# Patient Record
Sex: MALE | Race: WHITE | Employment: FULL TIME | URBAN - METROPOLITAN AREA
[De-identification: names, ages, dates, MRNs, and addresses within clinical notes are randomized per-mention and may not be internally consistent; named-entity substitution may affect disease eponyms.]

---

## 2017-03-10 ENCOUNTER — TELEPHONE (OUTPATIENT)
Dept: INTERNAL MEDICINE CLINIC | Facility: CLINIC | Age: 62
End: 2017-03-10

## 2017-03-10 NOTE — TELEPHONE ENCOUNTER
Pt is aware and has received his letter in the mail   Pt would like to know if you would be making a special recommendation to see another MD due to his back issue  Please advise

## 2017-07-11 ENCOUNTER — OFFICE VISIT (OUTPATIENT)
Dept: PODIATRY CLINIC | Facility: CLINIC | Age: 62
End: 2017-07-11

## 2017-07-11 ENCOUNTER — TELEPHONE (OUTPATIENT)
Dept: INTERNAL MEDICINE CLINIC | Facility: CLINIC | Age: 62
End: 2017-07-11

## 2017-07-11 DIAGNOSIS — M79.671 FOOT PAIN, BILATERAL: Primary | ICD-10-CM

## 2017-07-11 DIAGNOSIS — M79.672 FOOT PAIN, BILATERAL: Primary | ICD-10-CM

## 2017-07-11 DIAGNOSIS — M72.2 PLANTAR FASCIITIS, BILATERAL: ICD-10-CM

## 2017-07-11 PROCEDURE — 99203 OFFICE O/P NEW LOW 30 MIN: CPT | Performed by: PODIATRIST

## 2017-07-11 PROCEDURE — 99212 OFFICE O/P EST SF 10 MIN: CPT | Performed by: PODIATRIST

## 2017-07-11 NOTE — PROGRESS NOTES
HPI:    Patient ID: Raeann Pickard is a 59year old male. HPI  This 58-year-old male presents to the office having not been seen in many many years.   He has been wearing the same orthotic device and feels as though it is not effectively controlling po encounter    Imaging & Referrals:  None       #[phone]

## 2017-07-11 NOTE — TELEPHONE ENCOUNTER
Per pt, he saw Dr Carly Mendoza today for his foot pain and has another appt on 08/02/2017 for f/u and would like to have a referral.  Pt has an appt with Dr Christiano Lobato on 08/10/2017. Pls advise.

## 2017-07-19 ENCOUNTER — HOSPITAL ENCOUNTER (OUTPATIENT)
Dept: GENERAL RADIOLOGY | Facility: HOSPITAL | Age: 62
Discharge: HOME | End: 2017-07-19
Attending: ORTHOPAEDIC SURGERY
Payer: COMMERCIAL

## 2017-07-19 ENCOUNTER — OFFICE VISIT (OUTPATIENT)
Dept: ORTHOPEDICS CLINIC | Facility: CLINIC | Age: 62
End: 2017-07-19

## 2017-07-19 DIAGNOSIS — M25.561 RIGHT KNEE PAIN, UNSPECIFIED CHRONICITY: ICD-10-CM

## 2017-07-19 DIAGNOSIS — M22.41 CHONDROMALACIA, PATELLA, RIGHT: ICD-10-CM

## 2017-07-19 DIAGNOSIS — M25.561 RIGHT KNEE PAIN, UNSPECIFIED CHRONICITY: Primary | ICD-10-CM

## 2017-07-19 PROCEDURE — 73560 X-RAY EXAM OF KNEE 1 OR 2: CPT | Performed by: ORTHOPAEDIC SURGERY

## 2017-07-19 PROCEDURE — 99243 OFF/OP CNSLTJ NEW/EST LOW 30: CPT | Performed by: ORTHOPAEDIC SURGERY

## 2017-07-19 PROCEDURE — 73565 X-RAY EXAM OF KNEES: CPT | Performed by: ORTHOPAEDIC SURGERY

## 2017-07-19 PROCEDURE — 99212 OFFICE O/P EST SF 10 MIN: CPT | Performed by: ORTHOPAEDIC SURGERY

## 2017-07-19 NOTE — H&P
Chief Complaint: Right knee pain    NURSING INTAKE COMMENTS: Patient presents with:  Knee Pain: Right - onset years ago - no injury - has pain acros the anterioir aspect of the knee rated as 4-6/10 on and off, no numbness or tingling, no swelling .       Hi Stability Testing        Anterior Drawer Negative Negative      Posterior Drawer Negative Negative      Lachman Negative Negative      Pivot Shift Negative Negative      Posterolateral corner Negative Negative        Varus Stress        0 Degrees Negat

## 2017-08-02 ENCOUNTER — OFFICE VISIT (OUTPATIENT)
Dept: PODIATRY CLINIC | Facility: CLINIC | Age: 62
End: 2017-08-02

## 2017-08-02 DIAGNOSIS — M72.2 PLANTAR FASCIITIS, BILATERAL: Primary | ICD-10-CM

## 2017-08-02 PROCEDURE — 99212 OFFICE O/P EST SF 10 MIN: CPT | Performed by: PODIATRIST

## 2017-08-02 NOTE — PROGRESS NOTES
HPI:    Patient ID: Eden Wilcox is a 59year old male. HPI  25-year-old male presents for follow-up and finds that he is more comfortable with added support but it is not sufficient.   Review of Systems         Current Outpatient Prescriptions:  asp

## 2017-08-10 ENCOUNTER — OFFICE VISIT (OUTPATIENT)
Dept: INTERNAL MEDICINE CLINIC | Facility: CLINIC | Age: 62
End: 2017-08-10

## 2017-08-10 VITALS
WEIGHT: 232.69 LBS | BODY MASS INDEX: 31.52 KG/M2 | SYSTOLIC BLOOD PRESSURE: 126 MMHG | HEIGHT: 72 IN | RESPIRATION RATE: 18 BRPM | HEART RATE: 84 BPM | DIASTOLIC BLOOD PRESSURE: 83 MMHG

## 2017-08-10 DIAGNOSIS — Z00.00 ROUTINE HEALTH MAINTENANCE: ICD-10-CM

## 2017-08-10 DIAGNOSIS — M79.672 FOOT PAIN, BILATERAL: ICD-10-CM

## 2017-08-10 DIAGNOSIS — M79.671 FOOT PAIN, BILATERAL: ICD-10-CM

## 2017-08-10 DIAGNOSIS — Z23 NEED FOR VACCINATION: ICD-10-CM

## 2017-08-10 DIAGNOSIS — M17.31 POST-TRAUMATIC OSTEOARTHRITIS OF RIGHT KNEE: Primary | ICD-10-CM

## 2017-08-10 PROCEDURE — 99214 OFFICE O/P EST MOD 30 MIN: CPT | Performed by: INTERNAL MEDICINE

## 2017-08-10 PROCEDURE — 90715 TDAP VACCINE 7 YRS/> IM: CPT | Performed by: INTERNAL MEDICINE

## 2017-08-10 PROCEDURE — 90471 IMMUNIZATION ADMIN: CPT | Performed by: INTERNAL MEDICINE

## 2017-08-10 PROCEDURE — 99212 OFFICE O/P EST SF 10 MIN: CPT | Performed by: INTERNAL MEDICINE

## 2017-08-10 NOTE — PROGRESS NOTES
HPI:    Patient ID: Jimenez Bettencourt is a 59year old male. Former pt of Dr. Severo Mask. He is a  at [employer]. He has knee pain and will start PT. He also has LBP and foot pain. He has a growth under his arm.   He has a history of ulcer maintenance  - COMP METABOLIC PANEL (14); Future  - TSH W REFLEX TO FREE T4; Future  - CBC WITH DIFFERENTIAL WITH PLATELET; Future  - PSA SCREEN; Future  - LIPID PANEL; Future  He will see the optometrist using his vision benefits    4.  Need for vaccinatio

## 2017-08-28 ENCOUNTER — OFFICE VISIT (OUTPATIENT)
Dept: PODIATRY CLINIC | Facility: CLINIC | Age: 62
End: 2017-08-28

## 2017-08-28 DIAGNOSIS — M72.2 PLANTAR FASCIITIS, BILATERAL: Primary | ICD-10-CM

## 2017-08-28 PROCEDURE — 99212 OFFICE O/P EST SF 10 MIN: CPT | Performed by: PODIATRIST

## 2017-08-28 PROCEDURE — 99213 OFFICE O/P EST LOW 20 MIN: CPT | Performed by: PODIATRIST

## 2017-08-28 PROCEDURE — 29799 UNLISTED PX CASTING/STRPG: CPT | Performed by: PODIATRIST

## 2017-08-28 PROCEDURE — L3000 FT INSERT UCB BERKELEY SHELL: HCPCS | Performed by: PODIATRIST

## 2017-08-28 NOTE — PROGRESS NOTES
HPI:    Patient ID: Morris Orantes is a 59year old male. HPI  This 57-year-old male presents for long-term orthotic control. Approval has been received for this treatment.   Review of Systems         Current Outpatient Prescriptions:  aspirin (BABY A

## 2017-08-29 ENCOUNTER — TELEPHONE (OUTPATIENT)
Dept: PODIATRY CLINIC | Facility: CLINIC | Age: 62
End: 2017-08-29

## 2017-08-29 DIAGNOSIS — M79.9 LESION OF SOFT TISSUE OF LOWER LEG AND ANKLE: Primary | ICD-10-CM

## 2017-08-29 NOTE — TELEPHONE ENCOUNTER
LM on pt's preferred # informing that dermatology referral has been placed. Gave pt phone # to schedule with dermatology.  Gave phone # for [facility] to check status of auth for derm referral.

## 2017-08-29 NOTE — TELEPHONE ENCOUNTER
Spoke to pt and he states that [facility] saw him in the office yesterday. States he mentioned to [facility] about some scars on his lower extremities and claims SCR states he should see a dermatologist regarding this. Pt asking for referral from [facility] for dermatology.  In

## 2017-09-21 ENCOUNTER — TELEPHONE (OUTPATIENT)
Dept: ORTHOPEDICS CLINIC | Facility: CLINIC | Age: 62
End: 2017-09-21

## 2017-09-21 NOTE — TELEPHONE ENCOUNTER
Orthotics are in . Call to loly on two phone numbers home and cell. No answerr. Left voice message on both lines. Needs an appointment at [facility].  Requested call back

## 2017-10-25 ENCOUNTER — OFFICE VISIT (OUTPATIENT)
Dept: PODIATRY CLINIC | Facility: CLINIC | Age: 62
End: 2017-10-25

## 2017-10-25 DIAGNOSIS — M72.2 PLANTAR FASCIITIS, BILATERAL: Primary | ICD-10-CM

## 2017-10-25 NOTE — PROGRESS NOTES
HPI:    Patient ID: Izabel Danielle is a 57year old male. HPI  24-year-old male presents for dispensing of orthoses.   They appear to be of proper fit I do not anticipate areas of irritation based on the fact that he is presently wearing a device I do

## 2017-12-04 ENCOUNTER — OFFICE VISIT (OUTPATIENT)
Dept: DERMATOLOGY CLINIC | Facility: CLINIC | Age: 62
End: 2017-12-04

## 2017-12-04 VITALS — HEIGHT: 72 IN | BODY MASS INDEX: 30.48 KG/M2 | WEIGHT: 225 LBS

## 2017-12-04 DIAGNOSIS — D23.70 BENIGN NEOPLASM OF SKIN OF LOWER LIMB, INCLUDING HIP, UNSPECIFIED LATERALITY: ICD-10-CM

## 2017-12-04 DIAGNOSIS — L71.9 ROSACEA: ICD-10-CM

## 2017-12-04 DIAGNOSIS — D23.60 BENIGN NEOPLASM OF SKIN OF UPPER LIMB, INCLUDING SHOULDER, UNSPECIFIED LATERALITY: ICD-10-CM

## 2017-12-04 DIAGNOSIS — D23.4 BENIGN NEOPLASM OF SCALP AND SKIN OF NECK: ICD-10-CM

## 2017-12-04 DIAGNOSIS — D23.30 BENIGN NEOPLASM OF SKIN OF FACE: ICD-10-CM

## 2017-12-04 DIAGNOSIS — D22.9 MULTIPLE NEVI: Primary | ICD-10-CM

## 2017-12-04 DIAGNOSIS — D23.5 BENIGN NEOPLASM OF SKIN OF TRUNK, EXCEPT SCROTUM: ICD-10-CM

## 2017-12-04 PROCEDURE — 99203 OFFICE O/P NEW LOW 30 MIN: CPT | Performed by: DERMATOLOGY

## 2017-12-04 PROCEDURE — 99212 OFFICE O/P EST SF 10 MIN: CPT | Performed by: DERMATOLOGY

## 2018-04-09 ENCOUNTER — MED REC SCAN ONLY (OUTPATIENT)
Dept: INTERNAL MEDICINE CLINIC | Facility: CLINIC | Age: 63
End: 2018-04-09

## 2018-06-14 ENCOUNTER — TELEPHONE (OUTPATIENT)
Dept: INTERNAL MEDICINE CLINIC | Facility: CLINIC | Age: 63
End: 2018-06-14

## 2018-06-14 DIAGNOSIS — Z00.00 ROUTINE HEALTH MAINTENANCE: Primary | ICD-10-CM

## 2018-06-14 NOTE — TELEPHONE ENCOUNTER
Pt req an order for needed labs before his OV on Tuesday, 7/3/2018, please call when order has been submitted.

## 2018-06-15 NOTE — TELEPHONE ENCOUNTER
Left patient a message informing him that MD had approved his lab orders and that he needed to fast for 12 hours

## 2018-07-03 ENCOUNTER — OFFICE VISIT (OUTPATIENT)
Dept: INTERNAL MEDICINE CLINIC | Facility: CLINIC | Age: 63
End: 2018-07-03

## 2018-07-03 VITALS
WEIGHT: 230.19 LBS | DIASTOLIC BLOOD PRESSURE: 84 MMHG | SYSTOLIC BLOOD PRESSURE: 132 MMHG | HEART RATE: 68 BPM | HEIGHT: 71 IN | BODY MASS INDEX: 32.23 KG/M2

## 2018-07-03 DIAGNOSIS — M17.31 POST-TRAUMATIC OSTEOARTHRITIS OF RIGHT KNEE: ICD-10-CM

## 2018-07-03 DIAGNOSIS — E66.9 OBESITY (BMI 30-39.9): Primary | ICD-10-CM

## 2018-07-03 DIAGNOSIS — E78.2 MIXED HYPERLIPIDEMIA: ICD-10-CM

## 2018-07-03 PROCEDURE — 99212 OFFICE O/P EST SF 10 MIN: CPT | Performed by: INTERNAL MEDICINE

## 2018-07-03 PROCEDURE — 99213 OFFICE O/P EST LOW 20 MIN: CPT | Performed by: INTERNAL MEDICINE

## 2018-07-03 NOTE — PATIENT INSTRUCTIONS
Follow a low carbohydrate diet. Examples of carbohydrates are sweets, bread, rice, potatoes, and pasta. Eat no more than 100 gm of carbs daily.   You should try to eat more salads with lowfat dressing, steamed vegetables, and broiled or grilled chicken and

## 2018-07-03 NOTE — PROGRESS NOTES
HPI:    Patient ID: Wilmer Ann is a 57year old male. Pt did not have time to get his blood tests. He needs to lose weight. He has been at [facility] for 21 years and there is some kind of change coming.   His low back pain is still there, History   Problem Relation Age of Onset   • Stroke Mother      CVA; Cause of death   • Macular degeneration Mother    • Diabetes Father        . /84 (BP Location: Right arm, Patient Position: Sitting, Cuff Size: large)   Pulse 68   Ht 5' 11\" (1.803 m

## 2018-07-03 NOTE — ASSESSMENT & PLAN NOTE
The patient has been doing a stair machine for exercise. I advised against this and encouraged him to use instead the recumbent bike or perhaps a treadmill for walking. Most importantly he needs to lose weight and he wants to do this.

## 2018-10-02 ENCOUNTER — MED REC SCAN ONLY (OUTPATIENT)
Dept: INTERNAL MEDICINE CLINIC | Facility: CLINIC | Age: 63
End: 2018-10-02

## 2018-10-02 ENCOUNTER — LAB ENCOUNTER (OUTPATIENT)
Dept: LAB | Facility: HOSPITAL | Age: 63
End: 2018-10-02
Attending: INTERNAL MEDICINE
Payer: COMMERCIAL

## 2018-10-02 ENCOUNTER — OFFICE VISIT (OUTPATIENT)
Dept: INTERNAL MEDICINE CLINIC | Facility: CLINIC | Age: 63
End: 2018-10-02

## 2018-10-02 VITALS
WEIGHT: 231.13 LBS | SYSTOLIC BLOOD PRESSURE: 123 MMHG | BODY MASS INDEX: 32.36 KG/M2 | HEIGHT: 71 IN | HEART RATE: 88 BPM | DIASTOLIC BLOOD PRESSURE: 80 MMHG

## 2018-10-02 DIAGNOSIS — E78.2 MIXED HYPERLIPIDEMIA: Primary | ICD-10-CM

## 2018-10-02 DIAGNOSIS — R73.09 ELEVATED GLUCOSE: ICD-10-CM

## 2018-10-02 DIAGNOSIS — Z00.00 ROUTINE HEALTH MAINTENANCE: ICD-10-CM

## 2018-10-02 DIAGNOSIS — E66.9 OBESITY (BMI 30-39.9): ICD-10-CM

## 2018-10-02 PROCEDURE — 84443 ASSAY THYROID STIM HORMONE: CPT

## 2018-10-02 PROCEDURE — 99212 OFFICE O/P EST SF 10 MIN: CPT | Performed by: INTERNAL MEDICINE

## 2018-10-02 PROCEDURE — 83036 HEMOGLOBIN GLYCOSYLATED A1C: CPT

## 2018-10-02 PROCEDURE — 36415 COLL VENOUS BLD VENIPUNCTURE: CPT

## 2018-10-02 PROCEDURE — 83721 ASSAY OF BLOOD LIPOPROTEIN: CPT

## 2018-10-02 PROCEDURE — 99214 OFFICE O/P EST MOD 30 MIN: CPT | Performed by: INTERNAL MEDICINE

## 2018-10-02 PROCEDURE — 90471 IMMUNIZATION ADMIN: CPT | Performed by: INTERNAL MEDICINE

## 2018-10-02 PROCEDURE — 80061 LIPID PANEL: CPT

## 2018-10-02 PROCEDURE — 90653 IIV ADJUVANT VACCINE IM: CPT | Performed by: INTERNAL MEDICINE

## 2018-10-02 PROCEDURE — 85025 COMPLETE CBC W/AUTO DIFF WBC: CPT

## 2018-10-02 PROCEDURE — 80053 COMPREHEN METABOLIC PANEL: CPT

## 2018-10-02 RX ORDER — ATORVASTATIN CALCIUM 10 MG/1
10 TABLET, FILM COATED ORAL NIGHTLY
Qty: 30 TABLET | Refills: 3 | Status: SHIPPED | OUTPATIENT
Start: 2018-10-02 | End: 2019-06-06

## 2018-10-02 NOTE — ASSESSMENT & PLAN NOTE
His TG and total cholesterol are very high. Discussed risks and benefits of statins. Pt will try low dose statin in addition to diet and exercise. Recheck lipids and liver tests in 3 months.

## 2018-10-02 NOTE — PATIENT INSTRUCTIONS
I recommend that you decrease consumption of sweets, sweet drinks, bread, rice, pasta, and potatoes. Try to limit your carbohydrates to 100 gm daily. Understanding Carbohydrates    A car needs the right type of fuel to run.  And you need the right figure out the right amount of carbohydrates for you. You may start with around 45 to 60 grams of carbohydrate per meal, depending on your situation.  Carb counting is a system that helps you keep track of the carbohydrates you eat at each meal.  [employer] labels. Compare the labels of different products, looking at serving sizes and total carbohydrates to find the products that work best for you.   · Don't forget protein and fat.  With all the focus on carb counting, it might be easy to forget protein and fa

## 2018-10-02 NOTE — ASSESSMENT & PLAN NOTE
His FBS was 120. Discussed prediabetes with pt. Will check A1C. Advised low carb diet.     Devika written information given to pt

## 2018-10-02 NOTE — PROGRESS NOTES
HPI:    Patient ID: Morris Orantes is a 70year old male. Pt is doing well. He did the blood test today. His TG and total cholesterol are very high. His low back hurts when he gets out of bed. His sugar was high.   He eats a lot of bread, but doesn' kg)   BMI 32.23 kg/m²   PHYSICAL EXAM:   Physical Exam   Nursing note and vitals reviewed. Constitutional: He is oriented to person, place, and time. He appears well-developed and well-nourished. HENT:   Head: Normocephalic and atraumatic.    Eyes: EOM

## 2019-06-06 ENCOUNTER — OFFICE VISIT (OUTPATIENT)
Dept: INTERNAL MEDICINE CLINIC | Facility: CLINIC | Age: 64
End: 2019-06-06

## 2019-06-06 ENCOUNTER — HOSPITAL ENCOUNTER (OUTPATIENT)
Dept: GENERAL RADIOLOGY | Facility: HOSPITAL | Age: 64
Discharge: HOME | End: 2019-06-06
Attending: INTERNAL MEDICINE
Payer: COMMERCIAL

## 2019-06-06 VITALS
BODY MASS INDEX: 32.06 KG/M2 | DIASTOLIC BLOOD PRESSURE: 80 MMHG | SYSTOLIC BLOOD PRESSURE: 122 MMHG | WEIGHT: 229 LBS | HEART RATE: 69 BPM | HEIGHT: 71 IN

## 2019-06-06 DIAGNOSIS — E78.00 HYPERCHOLESTEROLEMIA: Primary | ICD-10-CM

## 2019-06-06 DIAGNOSIS — D22.9 ATYPICAL MOLE: ICD-10-CM

## 2019-06-06 DIAGNOSIS — B35.4 TINEA CORPORIS: ICD-10-CM

## 2019-06-06 DIAGNOSIS — M25.552 PAIN OF LEFT HIP JOINT: ICD-10-CM

## 2019-06-06 PROCEDURE — 99214 OFFICE O/P EST MOD 30 MIN: CPT | Performed by: INTERNAL MEDICINE

## 2019-06-06 PROCEDURE — 99212 OFFICE O/P EST SF 10 MIN: CPT | Performed by: INTERNAL MEDICINE

## 2019-06-06 PROCEDURE — 73502 X-RAY EXAM HIP UNI 2-3 VIEWS: CPT | Performed by: INTERNAL MEDICINE

## 2019-06-06 RX ORDER — KETOCONAZOLE 20 MG/G
CREAM TOPICAL
Qty: 30 G | Refills: 0 | Status: SHIPPED | OUTPATIENT
Start: 2019-06-06 | End: 2019-07-30

## 2019-06-06 RX ORDER — ATORVASTATIN CALCIUM 10 MG/1
10 TABLET, FILM COATED ORAL NIGHTLY
Qty: 30 TABLET | Refills: 3 | Status: SHIPPED | OUTPATIENT
Start: 2019-06-06 | End: 2019-08-22

## 2019-06-06 NOTE — ASSESSMENT & PLAN NOTE
Will use antifungal cream.  Advised pt that I must see this in 3 weeks because if it does not resolve, he will need further w/u

## 2019-06-06 NOTE — ASSESSMENT & PLAN NOTE
Pt never took the Lipitor. Urged him to take it. Discussed that he can have 1 serving of grapefruit daily. Recheck lipids in 2 months.

## 2019-06-06 NOTE — PROGRESS NOTES
HPI:    Patient ID: Ladan Allen is a 70year old male. He started taking the Lipitor, but he only took it for 2 weeks because he drinks grapefruit juice. He has a mole on his left upper arm for several years.   He has an itchy rash on his right nip ketoconazole 2 % External Cream Apply to affected area twice a day. Disp: 30 g Rfl: 0   hydrocortisone 2.5 % External Cream Apply 1 Application topically daily. Disp: 28 g Rfl: 0   atorvastatin 10 MG Oral Tab Take 1 tablet (10 mg total) by mouth nightly.  D Unprioritized    Hypercholesterolemia - Primary     Pt never took the Lipitor. Urged him to take it. Discussed that he can have 1 serving of grapefruit daily. Recheck lipids in 2 months.          Relevant Medications    atorvastatin 10 MG Oral Tab    Ot

## 2019-07-01 ENCOUNTER — TELEPHONE (OUTPATIENT)
Dept: INTERNAL MEDICINE CLINIC | Facility: CLINIC | Age: 64
End: 2019-07-01

## 2019-07-08 ENCOUNTER — TELEPHONE (OUTPATIENT)
Dept: PHYSICAL THERAPY | Facility: HOSPITAL | Age: 64
End: 2019-07-08

## 2019-07-12 ENCOUNTER — APPOINTMENT (OUTPATIENT)
Dept: PHYSICAL THERAPY | Facility: HOSPITAL | Age: 64
End: 2019-07-12
Attending: INTERNAL MEDICINE
Payer: COMMERCIAL

## 2019-07-16 ENCOUNTER — TELEPHONE (OUTPATIENT)
Dept: INTERNAL MEDICINE CLINIC | Facility: CLINIC | Age: 64
End: 2019-07-16

## 2019-07-16 ENCOUNTER — OFFICE VISIT (OUTPATIENT)
Dept: PHYSICAL THERAPY | Facility: HOSPITAL | Age: 64
End: 2019-07-16
Attending: INTERNAL MEDICINE
Payer: COMMERCIAL

## 2019-07-16 DIAGNOSIS — M25.552 PAIN OF LEFT HIP JOINT: ICD-10-CM

## 2019-07-16 DIAGNOSIS — R92.8 ABNORMAL MAMMOGRAM: Primary | ICD-10-CM

## 2019-07-16 DIAGNOSIS — R23.4 BREAST SKIN CHANGES: ICD-10-CM

## 2019-07-16 PROCEDURE — 97162 PT EVAL MOD COMPLEX 30 MIN: CPT

## 2019-07-16 PROCEDURE — 97110 THERAPEUTIC EXERCISES: CPT

## 2019-07-16 NOTE — TELEPHONE ENCOUNTER
Salma called in from [address] [place] [address] Mammography requesting a diagnostic bilateral mammogram order. Pt is requesting to have this order completed on 7/19.   Please advise

## 2019-07-16 NOTE — PROGRESS NOTES
LOWER EXTREMITY EVALUATION:   Referring Physician: Dr. Lissa Hickman (ICD-10-CM) - 719.45 (ICD-9-CM) - Pain of left hip joint   DOI : Jan, 2019 Date of Service: 7/16/2019  Precautions: Tinea, Low back pain,   [facility] He History of current condition: fell on left side of hip and thigh on ice during January. Felt hip land hard on left side. Pain significant with significant bruising and swelling post fall. Residual tenderness at left lateral hip area.  Patient with decreased Patient education provided on assessment results and POC and HEP instruction  Patient was instructed in and issued HEP for: SKTC, Ham, gastroc, soleus, piriformis stretches, prone  Quad and hooklying LTR, core activation.    Charges: PT Eval, low complexity Predicted FOTO: 73/100  Patient  was advised of these findings, precautions, and treatment options and has agreed to actively participate in planning and for this course of care.     Thank you for your referral. Please co-sign or sign and return this letter

## 2019-07-19 ENCOUNTER — HOSPITAL ENCOUNTER (OUTPATIENT)
Dept: MAMMOGRAPHY | Facility: HOSPITAL | Age: 64
Discharge: HOME | End: 2019-07-19
Attending: INTERNAL MEDICINE
Payer: COMMERCIAL

## 2019-07-19 DIAGNOSIS — R23.4 BREAST SKIN CHANGES: ICD-10-CM

## 2019-07-19 PROCEDURE — 77066 DX MAMMO INCL CAD BI: CPT | Performed by: INTERNAL MEDICINE

## 2019-07-19 PROCEDURE — 77062 BREAST TOMOSYNTHESIS BI: CPT | Performed by: INTERNAL MEDICINE

## 2019-07-22 ENCOUNTER — OFFICE VISIT (OUTPATIENT)
Dept: PHYSICAL THERAPY | Facility: HOSPITAL | Age: 64
End: 2019-07-22
Attending: INTERNAL MEDICINE
Payer: COMMERCIAL

## 2019-07-22 ENCOUNTER — TELEPHONE (OUTPATIENT)
Dept: PHYSICAL THERAPY | Facility: HOSPITAL | Age: 64
End: 2019-07-22

## 2019-07-22 DIAGNOSIS — M25.552 PAIN OF LEFT HIP JOINT: ICD-10-CM

## 2019-07-22 PROCEDURE — 97140 MANUAL THERAPY 1/> REGIONS: CPT

## 2019-07-22 PROCEDURE — 97110 THERAPEUTIC EXERCISES: CPT

## 2019-07-22 NOTE — PROGRESS NOTES
Dx: M19.664 (ICD-10-CM) - 719.45 (ICD-9-CM) - Pain of left hip joint          Insurance (Authorized # of Visits):  8 O           Authorizing Physician: Dr. Mora Ellison  Next MD visit: none scheduled  Fall Risk: standard         Precautions: pes planus sessions  6.  Patient will be able to demonstrate in dependent HEP and health club exercise program with good spinal stabilization for more advanced HEP by 8 sessions.    7. Patient will be able to demonstrate improved FOTO score from initial  66 to 73 or g

## 2019-07-24 ENCOUNTER — OFFICE VISIT (OUTPATIENT)
Dept: PHYSICAL THERAPY | Facility: HOSPITAL | Age: 64
End: 2019-07-24
Attending: INTERNAL MEDICINE
Payer: COMMERCIAL

## 2019-07-24 DIAGNOSIS — M25.552 PAIN OF LEFT HIP JOINT: ICD-10-CM

## 2019-07-24 PROCEDURE — 97014 ELECTRIC STIMULATION THERAPY: CPT

## 2019-07-24 PROCEDURE — 97110 THERAPEUTIC EXERCISES: CPT

## 2019-07-24 PROCEDURE — 97140 MANUAL THERAPY 1/> REGIONS: CPT

## 2019-07-24 NOTE — PROGRESS NOTES
Dx: X68.387 (ICD-10-CM) - 719.45 (ICD-9-CM) - Pain of left hip joint          Insurance (Authorized # of Visits):  8 O           Authorizing Physician: Dr. Kamille Josue  Next MD visit: none scheduled  Fall Risk: standard         Precautions: pes planus 75% or greater reduction of pain with ADL's and exercise program  by 8 sessions  6.  Patient will be able to demonstrate in dependent HEP and health club exercise program with good spinal stabilization for more advanced HEP by 8 sessions.    7. Patient will

## 2019-07-29 ENCOUNTER — APPOINTMENT (OUTPATIENT)
Dept: PHYSICAL THERAPY | Facility: HOSPITAL | Age: 64
End: 2019-07-29
Attending: INTERNAL MEDICINE
Payer: COMMERCIAL

## 2019-07-30 DIAGNOSIS — B35.4 TINEA CORPORIS: ICD-10-CM

## 2019-07-30 RX ORDER — KETOCONAZOLE 20 MG/G
CREAM TOPICAL
Qty: 30 G | Refills: 0 | Status: SHIPPED | OUTPATIENT
Start: 2019-07-30 | End: 2020-08-26

## 2019-07-30 NOTE — TELEPHONE ENCOUNTER
Followed up with patient, reports has continued to use both creams prescribed for his rash, although the rash has improved it has not resolved and he would like to continue use of the creams.  Did have an appt scheduled for today but was unable to come in,

## 2019-07-30 NOTE — TELEPHONE ENCOUNTER
Per MD note 6/6/19 pt needs f/u for rash on chest and per 7/2/19 my chart message pt states rash was resolving    Pt was sent a my chart message to contact clinic

## 2019-07-31 ENCOUNTER — OFFICE VISIT (OUTPATIENT)
Dept: PHYSICAL THERAPY | Facility: HOSPITAL | Age: 64
End: 2019-07-31
Attending: INTERNAL MEDICINE
Payer: COMMERCIAL

## 2019-07-31 DIAGNOSIS — M25.552 PAIN OF LEFT HIP JOINT: ICD-10-CM

## 2019-07-31 PROCEDURE — 97140 MANUAL THERAPY 1/> REGIONS: CPT

## 2019-07-31 PROCEDURE — 97110 THERAPEUTIC EXERCISES: CPT

## 2019-07-31 NOTE — PROGRESS NOTES
Dx: V03.779 (ICD-10-CM) - 719.45 (ICD-9-CM) - Pain of left hip joint          Insurance (Authorized # of Visits):  8 O           Authorizing Physician: Dr. Alfredo Phillips MD visit: none scheduled  Fall Risk: standard         Precautions: pes planus good understanding of basics of proper body mechanics and posture by 3 sessions  5  Patient will demonstrate over 75% or greater reduction of pain with ADL's and exercise program  by 8 sessions  6.  Patient will be able to demonstrate in dependent HEP and

## 2019-08-05 ENCOUNTER — OFFICE VISIT (OUTPATIENT)
Dept: PHYSICAL THERAPY | Facility: HOSPITAL | Age: 64
End: 2019-08-05
Attending: INTERNAL MEDICINE
Payer: COMMERCIAL

## 2019-08-05 DIAGNOSIS — M25.552 PAIN OF LEFT HIP JOINT: ICD-10-CM

## 2019-08-05 PROCEDURE — 97110 THERAPEUTIC EXERCISES: CPT

## 2019-08-05 NOTE — PROGRESS NOTES
Dx: H20.416 (ICD-10-CM) - 719.45 (ICD-9-CM) - Pain of left hip joint          Insurance (Authorized # of Visits):  8 O           Authorizing Physician: Dr. Christina Jules  Next MD visit: none scheduled  Fall Risk: standard         Precautions: pes planus lateral hip and buttock pain to max of 2-3/10 by 5-6 sessions  4.  Patient will be able to demonstrate good understanding of basics of proper body mechanics and posture by 3 sessions  5  Patient will demonstrate over 75% or greater reduction of pain with A

## 2019-08-07 ENCOUNTER — OFFICE VISIT (OUTPATIENT)
Dept: PHYSICAL THERAPY | Facility: HOSPITAL | Age: 64
End: 2019-08-07
Attending: INTERNAL MEDICINE
Payer: COMMERCIAL

## 2019-08-07 DIAGNOSIS — M25.552 PAIN OF LEFT HIP JOINT: ICD-10-CM

## 2019-08-07 PROCEDURE — 97110 THERAPEUTIC EXERCISES: CPT

## 2019-08-07 NOTE — PROGRESS NOTES
PROGRESS SUMMARY    Pt has attended 6/8 authorized visits in Physical Therapy.    Dx: I04.379 (ICD-10-CM) - 719.45 (ICD-9-CM) - Pain of left hip joint          Insurance (Authorized # of Visits):  [address] [facility] Physician: Dr. Tommy Goltz Next MD visits  Improved  3.  Patient will be able to demonstrate reduction of left lateral hip and buttock pain to max of 2-3/10 by 5-6 sessions MET  4.  Patient will be able to demonstrate good understanding of basics of proper body mechanics and posture by 3 se care.    X______________________________________________ Date________________  Certification From: 3/7/7187      To: 11/5/20    Date:   7/31/19              TX#: 4/8 Date: 8/5/19                TX#: 5/8 Date: 8/7/19  Tx#: 6/8   TherEX  30 min There ex: 45

## 2019-08-10 ENCOUNTER — OFFICE VISIT (OUTPATIENT)
Dept: DERMATOLOGY CLINIC | Facility: CLINIC | Age: 64
End: 2019-08-10

## 2019-08-10 DIAGNOSIS — L91.8 SKIN TAG: ICD-10-CM

## 2019-08-10 DIAGNOSIS — D23.60 BENIGN NEOPLASM OF SKIN OF UPPER LIMB, INCLUDING SHOULDER, UNSPECIFIED LATERALITY: Primary | ICD-10-CM

## 2019-08-10 DIAGNOSIS — L30.9 DERMATITIS: ICD-10-CM

## 2019-08-10 PROCEDURE — 99213 OFFICE O/P EST LOW 20 MIN: CPT | Performed by: DERMATOLOGY

## 2019-08-10 RX ORDER — UREA 40 %
CREAM (GRAM) TOPICAL
Qty: 1 TUBE | Refills: 3 | Status: SHIPPED | OUTPATIENT
Start: 2019-08-10 | End: 2020-08-26

## 2019-08-10 NOTE — PROGRESS NOTES
Past Medical History:   Diagnosis Date   • Onychomycosis 2010   • Other and unspecified hyperlipidemia    • Plantar fasciitis 2009, 2010    - Orthotics   • Thoracic or lumbosacral neuritis or radiculitis, unspecified    • Tinea 2009, 2010     Past Surgical Hazards: Not Asked        Sleep Concern: Not Asked        Stress Concern: Not Asked        Weight Concern: Not Asked        Special Diet: Not Asked        Back Care: Not Asked        Exercise: Not Asked        Bike Helmet: Not Asked        Seat Belt: Not A

## 2019-08-10 NOTE — PROGRESS NOTES
Patient presents HPI:     Chief Complaint     Lesion        HPI     Lesion      Additional comments: Last seen by Abrahan Lund on 12/4/17. Pt presents with lesion of concern on Left upper arm, denies symptoms. Denies personal and family hx of skin cancer. Tube Rfl: 3   ketoconazole 2 % External Cream APPLY TO TO THE AFFECTED AREA TWICE DAILY Disp: 30 g Rfl: 0   HYDROCORTISONE 2.5 % External Cream APPLY 1 APPLICATION TOPICALLY DAILY Disp: 30 g Rfl: 0   atorvastatin 10 MG Oral Tab Take 1 tablet (10 mg total) Relationship status: Not on file      Intimate partner violence:        Fear of current or ex partner: Not on file        Emotionally abused: Not on file        Physically abused: Not on file        Forced sexual activity: Not on file    Other Topics be indicated. At this juncture since he can observe it and he has had it for many many years without change in size shape or color, we will continue to observe.   Patient understands need to follow-up if change–ABCDEs of melanoma reviewed  Skin tag-reassur

## 2019-08-22 ENCOUNTER — LAB ENCOUNTER (OUTPATIENT)
Dept: LAB | Facility: HOSPITAL | Age: 64
End: 2019-08-22
Attending: INTERNAL MEDICINE
Payer: COMMERCIAL

## 2019-08-22 ENCOUNTER — OFFICE VISIT (OUTPATIENT)
Dept: INTERNAL MEDICINE CLINIC | Facility: CLINIC | Age: 64
End: 2019-08-22

## 2019-08-22 VITALS
HEIGHT: 71 IN | WEIGHT: 229.5 LBS | BODY MASS INDEX: 32.13 KG/M2 | HEART RATE: 80 BPM | DIASTOLIC BLOOD PRESSURE: 85 MMHG | SYSTOLIC BLOOD PRESSURE: 128 MMHG

## 2019-08-22 DIAGNOSIS — Z23 NEED FOR VACCINATION: ICD-10-CM

## 2019-08-22 DIAGNOSIS — E78.00 HYPERCHOLESTEROLEMIA: ICD-10-CM

## 2019-08-22 DIAGNOSIS — L30.9 DERMATITIS: ICD-10-CM

## 2019-08-22 DIAGNOSIS — M25.552 PAIN OF LEFT HIP JOINT: ICD-10-CM

## 2019-08-22 DIAGNOSIS — E78.00 HYPERCHOLESTEROLEMIA: Primary | ICD-10-CM

## 2019-08-22 DIAGNOSIS — E78.2 MIXED HYPERLIPIDEMIA: ICD-10-CM

## 2019-08-22 LAB
ALT SERPL-CCNC: 28 U/L (ref 16–61)
AST SERPL-CCNC: 22 U/L (ref 15–37)
CHOLEST SMN-MCNC: 202 MG/DL (ref ?–200)
HDLC SERPL-MCNC: 61 MG/DL (ref 40–59)
LDLC SERPL CALC-MCNC: 87 MG/DL (ref ?–100)
LDLC SERPL DIRECT ASSAY-MCNC: 109 MG/DL (ref ?–100)
NONHDLC SERPL-MCNC: 141 MG/DL (ref ?–130)
PATIENT FASTING: YES
TRIGL SERPL-MCNC: 269 MG/DL (ref 30–149)
VLDLC SERPL CALC-MCNC: 54 MG/DL (ref 0–30)

## 2019-08-22 PROCEDURE — 83721 ASSAY OF BLOOD LIPOPROTEIN: CPT

## 2019-08-22 PROCEDURE — 84450 TRANSFERASE (AST) (SGOT): CPT

## 2019-08-22 PROCEDURE — 80061 LIPID PANEL: CPT

## 2019-08-22 PROCEDURE — 36415 COLL VENOUS BLD VENIPUNCTURE: CPT

## 2019-08-22 PROCEDURE — 99214 OFFICE O/P EST MOD 30 MIN: CPT | Performed by: INTERNAL MEDICINE

## 2019-08-22 PROCEDURE — 90670 PCV13 VACCINE IM: CPT | Performed by: INTERNAL MEDICINE

## 2019-08-22 PROCEDURE — 84460 ALANINE AMINO (ALT) (SGPT): CPT

## 2019-08-22 PROCEDURE — 90471 IMMUNIZATION ADMIN: CPT | Performed by: INTERNAL MEDICINE

## 2019-08-22 RX ORDER — ATORVASTATIN CALCIUM 10 MG/1
10 TABLET, FILM COATED ORAL NIGHTLY
Qty: 90 TABLET | Refills: 1 | Status: SHIPPED | OUTPATIENT
Start: 2019-08-22 | End: 2020-03-14

## 2019-08-22 NOTE — PROGRESS NOTES
HPI:    Patient ID: Latasha Holloway is a 70year old male. The physical therapy is helping with the hip pain. He will probably need more visits. He saw the dermatologist and she recommended to use the cream on the breast rash.   He still has pain in r ketoconazole 2 % External Cream APPLY TO TO THE AFFECTED AREA TWICE DAILY Disp: 30 g Rfl: 0       Allergies:  Penicillins                 Comment:Other reaction(s): Unknown     Social History    Tobacco Use      Smoking status: Never Smoker      Smokeless Patient has an area of dermatitis on his right areole. The mammogram was negative. This is only improved slightly with the ketoconazole and the 2% hydrocortisone. I will increase the steroid to triamcinolone.   Patient should call if this does not reso

## 2019-08-22 NOTE — ASSESSMENT & PLAN NOTE
Patient's recent labs with him. His cholesterol has come down remarkably on the atorvastatin. He does not have any of the myalgias that he had previously. Continue the atorvastatin. Follow-up in 6 months.

## 2019-08-22 NOTE — ASSESSMENT & PLAN NOTE
Patient has an area of dermatitis on his right areole. The mammogram was negative. This is only improved slightly with the ketoconazole and the 2% hydrocortisone. I will increase the steroid to triamcinolone.   Patient should call if this does not resolv

## 2019-09-18 ENCOUNTER — TELEPHONE (OUTPATIENT)
Dept: PHYSICAL THERAPY | Facility: HOSPITAL | Age: 64
End: 2019-09-18

## 2019-09-18 NOTE — TELEPHONE ENCOUNTER
Left VM message for patient requesting his return call regarding missed appt this morning and if intends to continue PT at this time.   Left information regarding next appt and request to confirm if intends to continue further PT.

## 2019-09-23 ENCOUNTER — TELEPHONE (OUTPATIENT)
Dept: PHYSICAL THERAPY | Facility: HOSPITAL | Age: 64
End: 2019-09-23

## 2019-09-27 ENCOUNTER — APPOINTMENT (OUTPATIENT)
Dept: PHYSICAL THERAPY | Facility: HOSPITAL | Age: 64
End: 2019-09-27
Attending: INTERNAL MEDICINE
Payer: COMMERCIAL

## 2019-10-02 ENCOUNTER — OFFICE VISIT (OUTPATIENT)
Dept: PHYSICAL THERAPY | Facility: HOSPITAL | Age: 64
End: 2019-10-02
Attending: INTERNAL MEDICINE
Payer: COMMERCIAL

## 2019-10-02 PROCEDURE — 97110 THERAPEUTIC EXERCISES: CPT

## 2019-10-02 NOTE — PROGRESS NOTES
Dx: J41.002 (ICD-10-CM) - 719.45 (ICD-9-CM) - Pain of left hip joint          Insurance (Authorized # of Visits):  14 O  Expires 12/31/19         Authorizing Physician: Dr. Americo Flores  Next MD visit: none scheduled  Fall Risk: standard         Precautions: Patient will demonstrate decrease pain left hip and lateral thigh area to 1-2/10 or absent by 8-10 session  9.  Patient will demonstrate improved core strength and lower extremity flexibility (to at worst mod tightness) all major ms groups  Partially MET  1

## 2019-10-07 ENCOUNTER — OFFICE VISIT (OUTPATIENT)
Dept: PHYSICAL THERAPY | Facility: HOSPITAL | Age: 64
End: 2019-10-07
Attending: INTERNAL MEDICINE
Payer: COMMERCIAL

## 2019-10-07 PROCEDURE — 97140 MANUAL THERAPY 1/> REGIONS: CPT

## 2019-10-07 PROCEDURE — 97110 THERAPEUTIC EXERCISES: CPT

## 2019-10-07 NOTE — PROGRESS NOTES
Dx: Y72.713 (ICD-10-CM) - 719.45 (ICD-9-CM) - Pain of left hip joint          Insurance (Authorized # of Visits):  14 O  Expires 12/31/19         Authorizing Physician: Dr. Alyssa Lee  Next MD visit: none scheduled  Fall Risk: standard         Precautions: 73 or greater by 8 sessions. To BE DETERMINED  8. Patient will demonstrate decrease pain left hip and lateral thigh area to 1-2/10 or absent by 8-10 session  9.  Patient will demonstrate improved core strength and lower extremity flexibility (to at worst mo

## 2019-10-11 ENCOUNTER — APPOINTMENT (OUTPATIENT)
Dept: PHYSICAL THERAPY | Facility: HOSPITAL | Age: 64
End: 2019-10-11
Attending: INTERNAL MEDICINE
Payer: COMMERCIAL

## 2019-10-17 ENCOUNTER — OFFICE VISIT (OUTPATIENT)
Dept: PHYSICAL THERAPY | Facility: HOSPITAL | Age: 64
End: 2019-10-17
Attending: INTERNAL MEDICINE
Payer: COMMERCIAL

## 2019-10-17 PROCEDURE — 97110 THERAPEUTIC EXERCISES: CPT

## 2019-10-17 NOTE — PROGRESS NOTES
Dx: K90.280 (ICD-10-CM) - 719.45 (ICD-9-CM) - Pain of left hip joint          Insurance (Authorized # of Visits):  14 O  Expires 12/31/19         Authorizing Physician: Dr. Christina Jules  Next MD visit: none scheduled  Fall Risk: standard         Precautions: sessions. To BE DETERMINED  8. Patient will demonstrate decrease pain left hip and lateral thigh area to 1-2/10 or absent by 8-10 session  9.  Patient will demonstrate improved core strength and lower extremity flexibility (to at worst mod tightness) all ma

## 2019-10-24 ENCOUNTER — TELEPHONE (OUTPATIENT)
Dept: PHYSICAL THERAPY | Facility: HOSPITAL | Age: 64
End: 2019-10-24

## 2019-10-24 ENCOUNTER — APPOINTMENT (OUTPATIENT)
Dept: PHYSICAL THERAPY | Facility: HOSPITAL | Age: 64
End: 2019-10-24
Attending: INTERNAL MEDICINE
Payer: COMMERCIAL

## 2019-10-28 ENCOUNTER — APPOINTMENT (OUTPATIENT)
Dept: PHYSICAL THERAPY | Facility: HOSPITAL | Age: 64
End: 2019-10-28
Attending: INTERNAL MEDICINE
Payer: COMMERCIAL

## 2019-10-30 ENCOUNTER — APPOINTMENT (OUTPATIENT)
Dept: PHYSICAL THERAPY | Facility: HOSPITAL | Age: 64
End: 2019-10-30
Attending: INTERNAL MEDICINE
Payer: COMMERCIAL

## 2019-11-06 ENCOUNTER — OFFICE VISIT (OUTPATIENT)
Dept: PHYSICAL THERAPY | Facility: HOSPITAL | Age: 64
End: 2019-11-06
Attending: INTERNAL MEDICINE
Payer: COMMERCIAL

## 2019-11-06 PROCEDURE — 97110 THERAPEUTIC EXERCISES: CPT

## 2019-11-06 PROCEDURE — 97140 MANUAL THERAPY 1/> REGIONS: CPT

## 2019-11-06 NOTE — PROGRESS NOTES
PROGRESS SUMMARY  Dx: Q44.279 (ICD-10-CM) - 719.45 (ICD-9-CM) - Pain of left hip joint          Insurance (Authorized # of Visits):  14 O  Expires 12/31/19         Authorizing Physician: Dr. Mora Ellison  Next MD visit: none scheduled  Fall Risk: standard 4-/5 Flexion: R 5/5; L 5/5  Extension: R 4+/5; L 4+/5    DF: R 5/5; L 5/5  PF: R 4/5; L 4/5        Special tests: TA activation poor     Balance: Initial : SLS: R 15 sec, L 8 sec  11/6/19: SLS  R: 16 secs L;  28 secs     Initial FOTO: 66/100  Predicted FOT MET       Plan: Continue for 4 more sessions L hip stretching, core and lower extremity strengthening, ROM, postural training, HEP training, manual therapy for joint mobilization and spinal mobilization.  Pt is to resume HEP more consistently with focus on

## 2019-11-08 ENCOUNTER — OFFICE VISIT (OUTPATIENT)
Dept: PHYSICAL THERAPY | Facility: HOSPITAL | Age: 64
End: 2019-11-08
Attending: INTERNAL MEDICINE
Payer: COMMERCIAL

## 2019-11-08 PROCEDURE — 97140 MANUAL THERAPY 1/> REGIONS: CPT

## 2019-11-08 PROCEDURE — 97110 THERAPEUTIC EXERCISES: CPT

## 2019-11-08 NOTE — PROGRESS NOTES
PROGRESS SUMMARY  Dx: R99.078 (ICD-10-CM) - 719.45 (ICD-9-CM) - Pain of left hip joint          Insurance (Authorized # of Visits):  14 O  Expires 12/31/19         Authorizing Physician: Dr. Angelika Sandy  Next MD visit: none scheduled  Fall Risk: standard 28 secs     Initial FOTO: 66/100  Predicted FOTO: 73/100  11/6/19 FOTO: 64/100           Date: 8/7/19  Tx#: 6/8 Date: 10/2/19  Visit 7/14 Date: 10/7/19  Visit 8/14 Date: 10/17/19  Visit 9/14 Date: 11/6/19  Visit 10/14 Date: 11/8/19  Visit: 11/14   zaid prado improved core strength and lower extremity flexibility (to at worst mod tightness) all major ms groups  Partially MET  10. SLS to 15 secs or better bilateral by 8 sessions.   MET      Assessment: Patient reports inconsistent HEP compliance, but has reported

## 2019-11-19 ENCOUNTER — OFFICE VISIT (OUTPATIENT)
Dept: PHYSICAL THERAPY | Facility: HOSPITAL | Age: 64
End: 2019-11-19
Attending: INTERNAL MEDICINE
Payer: COMMERCIAL

## 2019-11-19 PROCEDURE — 97140 MANUAL THERAPY 1/> REGIONS: CPT

## 2019-11-19 PROCEDURE — 97110 THERAPEUTIC EXERCISES: CPT

## 2019-11-19 NOTE — PROGRESS NOTES
PROGRESS SUMMARY  Dx: X34.460 (ICD-10-CM) - 719.45 (ICD-9-CM) - Pain of left hip joint          Insurance (Authorized # of Visits):  14 [employer]  Expires 12/31/19         Authorizing Physician: Dr. Christiano Lobato  Next MD visit: none scheduled  Fall Risk: standard right         Therap Ex    Supine hamstring stretch with belt 30\" x 3 r/l   Quadruped rocking with protraction 15 x 5\"   SKTC stretch 30\" x 3   Prone quad stretch 30\" x 3   Heel sit with reach all four  Seated SB fwd roll x 30  Quadruped cat/cow x 20 Assess response to hip joint mobilization for inferior glides and lateral distractions bilateral. Pt is to resume HEP more consistently with focus on ROM and stretching.        Charges: ther ex x 2,  Man x 1    Total Timed Treatment: 43 min  Total Treatment

## 2019-12-10 ENCOUNTER — OFFICE VISIT (OUTPATIENT)
Dept: PHYSICAL THERAPY | Facility: HOSPITAL | Age: 64
End: 2019-12-10
Attending: INTERNAL MEDICINE
Payer: COMMERCIAL

## 2019-12-10 PROCEDURE — 97110 THERAPEUTIC EXERCISES: CPT

## 2019-12-10 PROCEDURE — 97140 MANUAL THERAPY 1/> REGIONS: CPT

## 2019-12-10 NOTE — PROGRESS NOTES
PROGRESS SUMMARY  Dx: Z08.694 (ICD-10-CM) - 719.45 (ICD-9-CM) - Pain of left hip joint          Insurance (Authorized # of Visits):  14 O  Expires 12/31/19         Authorizing Physician: Dr. Ophelia Richardson  Next MD visit: none scheduled  Fall Risk: standard mins Man Mob  10 min                HEP: heel sit with lateral flexion to left and right         Therap Ex    Supine hamstring stretch with belt 30\" x 3 r/l   Quadruped rocking with protraction 15 x 5\" -deferred kneeling  SKTC stretch 30\" x 3    Seated major ms groups  Partially MET  10. SLS to 15 secs or better bilateral by 8 sessions. MET      Assessment: Patient with new onset of L knee pain and was advised to follow up with his physician. Pt continues to have decreased postural awareness.        Pawel

## 2019-12-12 ENCOUNTER — OFFICE VISIT (OUTPATIENT)
Dept: INTERNAL MEDICINE CLINIC | Facility: CLINIC | Age: 64
End: 2019-12-12

## 2019-12-12 VITALS
DIASTOLIC BLOOD PRESSURE: 83 MMHG | WEIGHT: 228.69 LBS | HEART RATE: 83 BPM | BODY MASS INDEX: 32.02 KG/M2 | SYSTOLIC BLOOD PRESSURE: 134 MMHG | HEIGHT: 71 IN

## 2019-12-12 DIAGNOSIS — R21 RASH: Primary | ICD-10-CM

## 2019-12-12 DIAGNOSIS — M25.562 ACUTE PAIN OF LEFT KNEE: ICD-10-CM

## 2019-12-12 PROCEDURE — 99213 OFFICE O/P EST LOW 20 MIN: CPT | Performed by: NURSE PRACTITIONER

## 2019-12-12 PROCEDURE — 90471 IMMUNIZATION ADMIN: CPT | Performed by: NURSE PRACTITIONER

## 2019-12-12 PROCEDURE — 90686 IIV4 VACC NO PRSV 0.5 ML IM: CPT | Performed by: NURSE PRACTITIONER

## 2019-12-12 RX ORDER — MELOXICAM 15 MG/1
15 TABLET ORAL DAILY
Qty: 30 TABLET | Refills: 5 | Status: SHIPPED | OUTPATIENT
Start: 2019-12-12 | End: 2020-01-07

## 2019-12-12 RX ORDER — CLOTRIMAZOLE AND BETAMETHASONE DIPROPIONATE 10; .64 MG/G; MG/G
1 CREAM TOPICAL 2 TIMES DAILY PRN
Qty: 15 G | Refills: 3 | Status: SHIPPED | OUTPATIENT
Start: 2019-12-12 | End: 2019-12-12

## 2019-12-12 NOTE — PROGRESS NOTES
HPI:    Patient ID: Casimir Cushing is a 75year old male. HPI Patient here for left knee pain and body rash especially right nipple. He saw Dr. Brett Waller and dermatologist and was given several anti-fugal cream and steroid creams.  The dermatitis on the r Unknown   PHYSICAL EXAM:   Physical Exam  /83 (BP Location: Right arm, Patient Position: Sitting, Cuff Size: large)   Pulse 83   Ht 5' 11\" (1.803 m)   Wt 228 lb 11.2 oz (103.7 kg)   BMI 31.90 kg/m²   Wt Readings from Last 2 Encounters:  12/12/19 : 2 Lotrisone cream after consultation with breast specialist.         Relevant Orders    XR KNEE ROUTINE (3 VIEWS), LEFT (CPT=73562)    SURGERY - INTERNAL               No follow-ups on file. Orders Placed This Encounter      Flulaval 6 months and older 0.

## 2019-12-13 ENCOUNTER — TELEPHONE (OUTPATIENT)
Dept: INTERNAL MEDICINE CLINIC | Facility: CLINIC | Age: 64
End: 2019-12-13

## 2019-12-13 ENCOUNTER — PATIENT MESSAGE (OUTPATIENT)
Dept: INTERNAL MEDICINE CLINIC | Facility: CLINIC | Age: 64
End: 2019-12-13

## 2019-12-13 NOTE — ASSESSMENT & PLAN NOTE
A/P 43-year-old male complaining of left knee pain that started last week. He states he feels like his knee is popping out at times and he has a sharp pain 10 out of 10. States he feels like he has to lift his leg up to avoid his knee from popping out.

## 2019-12-13 NOTE — TELEPHONE ENCOUNTER
Patient calling requesting that LUIS ANTONIO Johnson call him back. Patient stated he has a question regarding his visit to the clinic yesterday. Patient refused to give RN any further details.

## 2019-12-13 NOTE — ASSESSMENT & PLAN NOTE
A/P 28-year-old man complaining of a rash in his right nipple. Has the same rash on top of his left foot. Rash is circular and scaly. He also has  scattered rash on his abdomen. Small itchy scaly rash.   He said it is itchy he has been using several creams

## 2019-12-13 NOTE — TELEPHONE ENCOUNTER
[address]    Patient called to inform me that Dr. Alejandro askew did not have appointment available till February.   We will work on getting him a sooner appointment thank you    Zayra Roca, ANP

## 2019-12-14 ENCOUNTER — HOSPITAL ENCOUNTER (OUTPATIENT)
Dept: GENERAL RADIOLOGY | Facility: HOSPITAL | Age: 64
Discharge: HOME | End: 2019-12-14
Attending: NURSE PRACTITIONER
Payer: COMMERCIAL

## 2019-12-14 ENCOUNTER — TELEPHONE (OUTPATIENT)
Dept: INTERNAL MEDICINE CLINIC | Facility: CLINIC | Age: 64
End: 2019-12-14

## 2019-12-14 DIAGNOSIS — R21 RASH: ICD-10-CM

## 2019-12-14 DIAGNOSIS — M25.562 ACUTE PAIN OF LEFT KNEE: ICD-10-CM

## 2019-12-14 PROCEDURE — 73562 X-RAY EXAM OF KNEE 3: CPT | Performed by: NURSE PRACTITIONER

## 2019-12-14 NOTE — TELEPHONE ENCOUNTER
From: Randall Stanley  To: Jil Schwab, APRN  Sent: 12/13/2019 4:09 PM CST  Subject: Visit Follow-up Question    Ms.  Madison Jeremiah  Thank you and please share with Dr. Alex Escobedo  I have an appointment with the nipple specialist January 8   That was the earliest they

## 2019-12-14 NOTE — TELEPHONE ENCOUNTER
ANGELA Zapata left with X-ray of knee results. Plan  1) Continue to ice  2) Continue NSAID  3) Consider PT if no improvement. 4) Consider knee injection. May use the Steroid/Antifugal cream on other areas first and not the nipple.  If a

## 2019-12-16 ENCOUNTER — APPOINTMENT (OUTPATIENT)
Dept: PHYSICAL THERAPY | Facility: HOSPITAL | Age: 64
End: 2019-12-16
Attending: INTERNAL MEDICINE
Payer: COMMERCIAL

## 2019-12-20 ENCOUNTER — TELEPHONE (OUTPATIENT)
Dept: INTERNAL MEDICINE CLINIC | Facility: CLINIC | Age: 64
End: 2019-12-20

## 2019-12-20 NOTE — TELEPHONE ENCOUNTER
[facility] phoned his work, mobile and cell phone and left messages for him to see an orthopedic specialist. I will leave a referral in the computer.   Nurse Triage to assist.    Jorge Rasmussen

## 2019-12-20 NOTE — TELEPHONE ENCOUNTER
Spoke with patient RE: Pierce message below--patient reports knee gives out and it takes him a few minutes to get his kneecap back in place. \"I really want to take an Aleve for the pain, but I'm taking Meloxicam and I know I can't.  I thought of stopping

## 2019-12-20 NOTE — TELEPHONE ENCOUNTER
[address]     I phoned his work, mobile and cell phone and left messages for him to see an orthopedic specialist. I will leave a referral in the computer.   Nurse Triage to assist.     Melanie Rutledge

## 2019-12-21 NOTE — TELEPHONE ENCOUNTER
Left message to call ortho at [phone] ASAP on both home and work numbers     Advised to call back with any questions/ concerns

## 2019-12-24 NOTE — TELEPHONE ENCOUNTER
Appointment Information   Name: Alma Keane MRN: [id number]   Date: 1/7/2020 Status: Shimon   Appt Time: 9:00 AM Length: 20   Visit Type: CONSULT [6062] Copay: $30.00   Provider: Mayi Rosenberg MD [facility]   Referral Number: 1

## 2019-12-31 ENCOUNTER — TELEPHONE (OUTPATIENT)
Dept: INTERNAL MEDICINE CLINIC | Facility: CLINIC | Age: 64
End: 2019-12-31

## 2019-12-31 NOTE — TELEPHONE ENCOUNTER
Request for patient to continue physical therapy in 2020. If you agree I will create a new referral. Please advise.      Thank you,  [facility]

## 2020-01-06 ENCOUNTER — PATIENT MESSAGE (OUTPATIENT)
Dept: INTERNAL MEDICINE CLINIC | Facility: CLINIC | Age: 65
End: 2020-01-06

## 2020-01-06 DIAGNOSIS — Z78.9: Primary | ICD-10-CM

## 2020-01-06 DIAGNOSIS — M25.562 ACUTE PAIN OF LEFT KNEE: ICD-10-CM

## 2020-01-07 ENCOUNTER — OFFICE VISIT (OUTPATIENT)
Dept: ORTHOPEDICS CLINIC | Facility: CLINIC | Age: 65
End: 2020-01-07

## 2020-01-07 VITALS — HEIGHT: 71 IN | BODY MASS INDEX: 31.92 KG/M2 | WEIGHT: 228 LBS

## 2020-01-07 DIAGNOSIS — M17.12 OSTEOARTHRITIS OF LEFT KNEE, UNSPECIFIED OSTEOARTHRITIS TYPE: Primary | ICD-10-CM

## 2020-01-07 PROCEDURE — 99244 OFF/OP CNSLTJ NEW/EST MOD 40: CPT | Performed by: ORTHOPAEDIC SURGERY

## 2020-01-07 NOTE — PATIENT INSTRUCTIONS
Start physical therapy for left knee. Continue ice and Aleve for swelling. Continue knee Stabilizing brace.

## 2020-01-07 NOTE — PROGRESS NOTES
NURSING INTAKE COMMENTS: Patient presents with:  Consult: C/o left knee pain for about 4 months. Stts that he feels like his left knee cap is popping out. Recalls no injuries. Xray left knee completed on 12/12/19. Has tried icing the knee with relief. AFFECTED AREA TWICE DAILY 30 g 0       Penicillins                 Comment:Other reaction(s): Unknown  Family History   Problem Relation Age of Onset   • Stroke Mother         CVA; Cause of death   • Macular degeneration Mother    • Diabetes Father quadrant of medial translation of the patella which is limited by pain. There is a positive patellar grind test and retropatellar crepitus through an arc of motion. Active knee range of motion is from 0 to 130 degrees. There is no joint line tenderness. patellar compression syndrome. Plan: I had a long discussion with the patient about his knee, symptoms and the etiology. I recommend continued activity modification and NSAIDs as well as ice for more severe symptoms.   He should continue wearing his kne

## 2020-01-08 ENCOUNTER — OFFICE VISIT (OUTPATIENT)
Dept: SURGERY | Facility: CLINIC | Age: 65
End: 2020-01-08

## 2020-01-08 ENCOUNTER — OFFICE VISIT (OUTPATIENT)
Dept: PHYSICAL THERAPY | Facility: HOSPITAL | Age: 65
End: 2020-01-08
Attending: INTERNAL MEDICINE
Payer: COMMERCIAL

## 2020-01-08 VITALS
OXYGEN SATURATION: 100 % | RESPIRATION RATE: 18 BRPM | HEIGHT: 71 IN | HEART RATE: 81 BPM | BODY MASS INDEX: 31.92 KG/M2 | DIASTOLIC BLOOD PRESSURE: 67 MMHG | SYSTOLIC BLOOD PRESSURE: 135 MMHG | WEIGHT: 228 LBS

## 2020-01-08 DIAGNOSIS — L98.8 SKIN LESION OF BREAST: Primary | ICD-10-CM

## 2020-01-08 PROCEDURE — 99205 OFFICE O/P NEW HI 60 MIN: CPT | Performed by: SURGERY

## 2020-01-08 PROCEDURE — 97110 THERAPEUTIC EXERCISES: CPT

## 2020-01-08 RX ORDER — COVID-19 ANTIGEN TEST
220 KIT MISCELLANEOUS
COMMUNITY
End: 2020-08-26

## 2020-01-08 NOTE — PROGRESS NOTES
DC SUMMARY:   Dx: Q07.615 (ICD-10-CM) - 719.45 (ICD-9-CM) - Pain of left hip joint          Insurance (Authorized # of Visits):  14 HMO  Expires 12/31/19  Extension for final session today        Authorizing Physician: Dr. Henny Ramos  Next MD visit: none sc [phone]. Sincerely,  Electronically signed by therapist: Elle Wells PT     Physician's certification required:   No        AROM:  Knee   Flexion: R 140; L 138  Extension: R 0; L 0      Girth : R/L knee  SP 42.5 cm/ both    IF : 39 cm/39 mins      of 2-3/10 by 5-6 sessions MET  4.  Patient will be able to demonstrate good understanding of basics of proper body mechanics and posture by 3 sessions  MET  5  Patient will demonstrate over 75% or greater reduction of pain with ADL's and exercise program

## 2020-01-09 NOTE — PROGRESS NOTES
Breast Surgery New Patient Consultation    This is the first visit for this 75year old man, referred by Dr. Shara Woo, who presents for evaluation of right chest wall rash.     History of Present Illness:   Mr. Misti Alarcon is a 75year old man who prese Family History   Problem Relation Age of Onset   • Stroke Mother         CVA; Cause of death   • Macular degeneration Mother    • Diabetes Father    • Prostate Cancer Father 79     Social History:    Alcohol use Yes 0.0 standard drinks/week   Comment:  We change in skin color or change in moles. Hematologic/Lymphatic:  The patient denies easily bruising or bleeding or persistent swollen glands or lymph nodes.      Musculoskeletal:  The patient denies muscle aches/pain, joint pain, stiff joints, neck pain retraction. No nipple discharge can be elicited. The parenchyma is mildly nodular.  There are no dominant masses in the breast. The axillary tail is normal.  Left breast:   The skin, nipple, and areola appear normal. There is no skin dimpling with movement anything. He will see me in 3 weeks with his decision. He was given ample opportunity for questions and those questions were answered to his satisfaction.  He has been  encouraged to contact the office with any questions or concerns prior to his next appo

## 2020-01-09 NOTE — TELEPHONE ENCOUNTER
Office Visit     1/7/2020  [facility] for Arline Mari MD   SURGERY, ORTHOPEDIC   Osteoarthritis of left knee, unspecified osteoarthritis type   Dx   Consult   ;  Referred by DASIA Alvarado   Reason

## 2020-01-13 ENCOUNTER — OFFICE VISIT (OUTPATIENT)
Dept: DERMATOLOGY CLINIC | Facility: CLINIC | Age: 65
End: 2020-01-13

## 2020-01-13 DIAGNOSIS — D23.9 BENIGN NEOPLASM OF SKIN, UNSPECIFIED LOCATION: ICD-10-CM

## 2020-01-13 DIAGNOSIS — D22.9 MULTIPLE NEVI: ICD-10-CM

## 2020-01-13 DIAGNOSIS — L30.9 DERMATITIS: Primary | ICD-10-CM

## 2020-01-13 PROCEDURE — 99213 OFFICE O/P EST LOW 20 MIN: CPT | Performed by: DERMATOLOGY

## 2020-01-23 ENCOUNTER — OFFICE VISIT (OUTPATIENT)
Dept: INTERNAL MEDICINE CLINIC | Facility: CLINIC | Age: 65
End: 2020-01-23

## 2020-01-23 VITALS
HEART RATE: 74 BPM | HEIGHT: 71 IN | RESPIRATION RATE: 22 BRPM | DIASTOLIC BLOOD PRESSURE: 86 MMHG | SYSTOLIC BLOOD PRESSURE: 124 MMHG | BODY MASS INDEX: 32.62 KG/M2 | TEMPERATURE: 97 F | WEIGHT: 233 LBS

## 2020-01-23 DIAGNOSIS — L30.9 DERMATITIS: ICD-10-CM

## 2020-01-23 DIAGNOSIS — M25.562 ACUTE PAIN OF LEFT KNEE: Primary | ICD-10-CM

## 2020-01-23 PROCEDURE — 99213 OFFICE O/P EST LOW 20 MIN: CPT | Performed by: NURSE PRACTITIONER

## 2020-01-23 NOTE — ASSESSMENT & PLAN NOTE
A/P-scaly red inflamed skin of his right nipple is greatly improved. He followed up with Dr. Nitin Crenshaw and she suggested possible biopsy if dermatitis did not improve. She stated they may need to rule out Paget's disease.   Since there is been tremendous im

## 2020-01-23 NOTE — TELEPHONE ENCOUNTER
Hi it was nice to see you today. I put a referral in the system for you to see Dr. Mali Rodriguez. His phone is [phone]. The Shingrix shot is a two dose vaccine. I would recommend you receive both.     Willow Severs, ANP

## 2020-01-23 NOTE — ASSESSMENT & PLAN NOTE
A/P-75year-old male who is following up with left knee pain. Patient complains of right knee pain on the inner aspect of his knee. Pain is constant 2 out of 10 when he walks it is 5 out of 10 is been icing it when he can.   He is starting physical therap

## 2020-01-23 NOTE — PROGRESS NOTES
HPI:    Patient ID: Misti Alarcon is a 75year old male. HPI- Here for right breast nipple changes and left knee pain.   XR left knee on 12/14/19; saw Dr Radha Vera on 01/07/20 w/recommendation to continue wearing patellar stabilizing brace during the da Allergies:  Penicillins                 Comment:Other reaction(s): Unknown   PHYSICAL EXAM:   Physical Exam   Nursing note and vitals reviewed. Constitutional: He is oriented to person, place, and time. He appears well-developed and well-nourished. MRI.    Plan  1) MRI of left knee. 2) Continue physical therapy  3) Follow with injection            Infectious/Inflammatory    Dermatitis     A/P-scaly red inflamed skin of his right nipple is greatly improved.   He followed up with [facility] and she godinez

## 2020-01-24 ENCOUNTER — OFFICE VISIT (OUTPATIENT)
Dept: PHYSICAL THERAPY | Facility: HOSPITAL | Age: 65
End: 2020-01-24
Attending: INTERNAL MEDICINE
Payer: COMMERCIAL

## 2020-01-24 PROCEDURE — 97110 THERAPEUTIC EXERCISES: CPT

## 2020-01-24 PROCEDURE — 97161 PT EVAL LOW COMPLEX 20 MIN: CPT

## 2020-01-24 NOTE — PROGRESS NOTES
Knee Evaluation  Dx: Osteoarthritis of left knee, unspecified osteoarthritis type (M17.12)   Insurance [facility] BS HMO (Authorized # of Visits):   6        Authorizing Physician: Dr. Ophelia Phillips MD visit: none scheduled  Fall Risk: standard         Precautions sec  11/6/19: SLS  R: 16 secs L;  28 secs  1/24/20: SLS R/L 22s /30+ secs     Initial FOTO: To be determined  Predicted FOTO: To be determined      Therap Ex:  -Heel slides for left and right knee flexion:  10x   -QS instructions for Left 10 x 10 secs  -QS precautions, and treatment options and has agreed to actively participate in planning and for this course of care. Thank you for your referral. If you have any questions, please contact me at Dept: [phone].     Sincerely,  Electronically signed by abi

## 2020-01-26 NOTE — PROGRESS NOTES
Marquise Thomas is a 75year old male. HPI:     CC:  Patient presents with:  Derm Problem: LOV 8/101/9. pt presenting today with rash to R chest for 7 months. c/o itching. pt has tried Triamcinolone, Ketoconazole and Urea with slight improvement. status: Single      Spouse name: Not on file      Number of children: Not on file      Years of education: Not on file      Highest education level: Not on file    Occupational History      Not on file    Social Needs      Financial resource strain: Not on local anesthetic: No    Social History Narrative      Not on file    Family History   Problem Relation Age of Onset   • Stroke Mother         CVA; Cause of death   • Macular degeneration Mother    • Diabetes Father    • Prostate Cancer Father 79       Ther well marginated, uniformly pigmented, macules and papules 6 mm and less scattered on exam. pigmented lesions examined with dermoscopy benign-appearing patterns. Waxy tannish keratotic papules scattered, cherry-red vascular papules scattered.     See map cancer father no suspicious lesions presently sun protection. Dermatofibroma lesion of concern right pretibial leg. Reassurance given recommend observation. Typical pattern on dermatoscopy. Pros cons of removal discussed.   Asymptomatic patient is delvis

## 2020-01-27 ENCOUNTER — TELEPHONE (OUTPATIENT)
Dept: SURGERY | Facility: CLINIC | Age: 65
End: 2020-01-27

## 2020-01-27 NOTE — TELEPHONE ENCOUNTER
Pt coming in on 1/29 for a possible in office biopsy, called to ask if he sussy be able to arrive earlier for this procedure, as we will need additional time.

## 2020-01-29 ENCOUNTER — OFFICE VISIT (OUTPATIENT)
Dept: SURGERY | Facility: CLINIC | Age: 65
End: 2020-01-29

## 2020-01-29 VITALS
SYSTOLIC BLOOD PRESSURE: 147 MMHG | TEMPERATURE: 98 F | HEART RATE: 72 BPM | DIASTOLIC BLOOD PRESSURE: 93 MMHG | RESPIRATION RATE: 20 BRPM

## 2020-01-29 DIAGNOSIS — L98.8 SKIN LESION OF BREAST: Primary | ICD-10-CM

## 2020-01-29 PROCEDURE — 99214 OFFICE O/P EST MOD 30 MIN: CPT | Performed by: SURGERY

## 2020-01-30 NOTE — PROGRESS NOTES
Breast Surgery Surveillance      History of Present Illness:   Mr. Vera Browning is a 75year old man who presented with self detected rash overlying his chest on the right side associated with underlying tenderness.   He reports that he has had rashes o Used       Review of Systems:  General:   The patient denies, fever, chills, night sweats, fatigue, generalized weakness, change in appetite or weight loss.     HEENT:     The patient denies eye irritation, cataracts, redness, glaucoma, yellowing of the eye severe headaches, seizure/epilepsy, speech problems, coordination problems, trembling/tremors, fainting/black outs, dizziness, memory problems, loss of sensation/numbness, problems walking, weakness, tingling or burning in hands/feet.  There is no history o not enlarged. There are no palpable masses. Lymph Nodes: The supraclavicular, axillary and cervical regions are free of significant lymphadenopathy. Back: There is no vertebral column tenderness.     Skin: The skin appears normal. There are no suspic

## 2020-02-04 ENCOUNTER — HOSPITAL ENCOUNTER (OUTPATIENT)
Dept: MRI IMAGING | Age: 65
Discharge: HOME | End: 2020-02-04
Attending: NURSE PRACTITIONER
Payer: COMMERCIAL

## 2020-02-04 DIAGNOSIS — M25.562 CHRONIC PAIN OF LEFT KNEE: ICD-10-CM

## 2020-02-04 DIAGNOSIS — G89.29 CHRONIC PAIN OF LEFT KNEE: ICD-10-CM

## 2020-02-04 PROCEDURE — 73721 MRI JNT OF LWR EXTRE W/O DYE: CPT | Performed by: NURSE PRACTITIONER

## 2020-02-05 ENCOUNTER — TELEPHONE (OUTPATIENT)
Dept: INTERNAL MEDICINE CLINIC | Facility: CLINIC | Age: 65
End: 2020-02-05

## 2020-02-05 ENCOUNTER — TELEPHONE (OUTPATIENT)
Dept: OTHER | Age: 65
End: 2020-02-05

## 2020-02-05 ENCOUNTER — OFFICE VISIT (OUTPATIENT)
Dept: PHYSICAL THERAPY | Facility: HOSPITAL | Age: 65
End: 2020-02-05
Attending: INTERNAL MEDICINE
Payer: COMMERCIAL

## 2020-02-05 PROCEDURE — 97140 MANUAL THERAPY 1/> REGIONS: CPT

## 2020-02-05 PROCEDURE — 97110 THERAPEUTIC EXERCISES: CPT

## 2020-02-05 NOTE — PROGRESS NOTES
Knee Evaluation  Dx: Osteoarthritis of left knee, unspecified osteoarthritis type (M17.12)   Insurance [facility] BS HMO (Authorized # of Visits):   6        Authorizing Physician: Dr. Alfredo Phillips MD visit: none scheduled  Fall Risk: standard         Precautions secs  -isometric hamstrings (heel digs) 10 x 10 secs  -QS with SLR for Left 10 x neutral and 10 x with hip ER  -Hooklying ball squeezes 10 x 5 secs  -QS with red ball behind 10x 5 secs painfree with exercises  -Assisted hamstrings stretch 4 x 15 secs  -SKT closed chain exercise due to presence of continued symptom elevation. Will  Assess taping response at next session.      Plan: limit closed chain full wt bearing at present, patient to consider use of SPC if nightime pain continues, trial KT taping with pos

## 2020-02-05 NOTE — TELEPHONE ENCOUNTER
[address] [place] with patient regarding his MRI report. Offered him a sooner appointment with Dr. Bimal Gibson. Anival Ortega He would prefer to continue with Dr. Rodrigo Senior since he had an original appointment with him.     Kelli Berrios, ANP

## 2020-02-05 NOTE — TELEPHONE ENCOUNTER
Patient calling to speak to Acacia Echevarria regarding the results on his MRI done on his knee. [facility] message sent to her and she stated, \"I will call him. \"

## 2020-02-20 ENCOUNTER — OFFICE VISIT (OUTPATIENT)
Dept: PHYSICAL THERAPY | Facility: HOSPITAL | Age: 65
End: 2020-02-20
Attending: INTERNAL MEDICINE
Payer: COMMERCIAL

## 2020-02-20 PROCEDURE — 97110 THERAPEUTIC EXERCISES: CPT

## 2020-02-20 NOTE — PROGRESS NOTES
PROGRESS REPORT  Dx: Osteoarthritis of left knee, unspecified osteoarthritis type (M17.12)   Insurance [facility] BS HMO (completed /Authorized # of Visits):  3/ 6        Authorizing Physician: Dr. Ever Whitfield  Next MD visit: none scheduled  Fall Risk: standard compartment as well as tricompartment arthritis at left knee and medial collateral ligament involvement. Patient with improved knee flexion, and improved sleep tolerance and reduction of edema.   Some residual medial joint line tenderness left knee and pain line with precautions for tolerance and tape removal  -instruction in application of tape trial with patient with initial reduction of pain at rest    Goals:  by 8-12 visits   1.  Patient will be able to demonstrate good core activation of transfer abdomin

## 2020-02-25 ENCOUNTER — OFFICE VISIT (OUTPATIENT)
Dept: PHYSICAL THERAPY | Facility: HOSPITAL | Age: 65
End: 2020-02-25
Attending: INTERNAL MEDICINE
Payer: COMMERCIAL

## 2020-02-25 ENCOUNTER — OFFICE VISIT (OUTPATIENT)
Dept: ORTHOPEDICS CLINIC | Facility: CLINIC | Age: 65
End: 2020-02-25

## 2020-02-25 VITALS
BODY MASS INDEX: 32.62 KG/M2 | HEART RATE: 72 BPM | DIASTOLIC BLOOD PRESSURE: 80 MMHG | RESPIRATION RATE: 18 BRPM | SYSTOLIC BLOOD PRESSURE: 128 MMHG | HEIGHT: 71 IN | WEIGHT: 233 LBS

## 2020-02-25 DIAGNOSIS — M17.12 OSTEOARTHRITIS OF LEFT KNEE, UNSPECIFIED OSTEOARTHRITIS TYPE: Primary | ICD-10-CM

## 2020-02-25 PROCEDURE — 97140 MANUAL THERAPY 1/> REGIONS: CPT

## 2020-02-25 PROCEDURE — 97110 THERAPEUTIC EXERCISES: CPT

## 2020-02-25 PROCEDURE — 99214 OFFICE O/P EST MOD 30 MIN: CPT | Performed by: ORTHOPAEDIC SURGERY

## 2020-02-25 NOTE — PROGRESS NOTES
NURSING INTAKE COMMENTS: Patient presents with:  Knee Pain: Left f/u - he had only 1 session of PT - states he still has pain rated as 4-9/10 on and off ,      HPI: This 75year old male presents today with complaints of left knee pain follow-up.   The martina well, no recent fevers or chills, no significant weight loss or weight gain  SKIN: no ulcerated or worrisome skin lesions  EYES: denies blurred vision or double vision  HEENT: denies new nasal congestion  PULM: denies shortness of breath or cough  CARDIOVA deep meniscal femoral fibers of the MCL consistent with low-grade chronic sprain. Associated mild fluid distension of the MCL bursa is noted.  MENISCI:            The medial and lateral menisci are intact without evidence of tear or significant degenerati subchondral fracture relating to insufficiency, stress injury or perhaps a small focus of spontaneous osteonecrosis (SONK). 2. Tricompartmental osteoarthritis which is mild to moderate in severity.  3. Moderate joint effusion and moderate-sized Baker's cyst excuse any typos.     Marylene Emory, MD

## 2020-02-25 NOTE — PROGRESS NOTES
Dx: Osteoarthritis of left knee, unspecified osteoarthritis type (M17.12)   Insurance [facility] BS HMO (completed /Authorized # of Visits):  4/ 8        Authorizing Physician: Dr. Lundy Schilder Next MD visit: none scheduled  Fall Risk: standard         Precautions: extremity mechanics  -SLS left LE's 30 + secs  -QS 10x 10 secs  With VMO  -SHuttle partial squats 1 blue and 1 yellow band with limited wt to work on control and tracking of tibiofemoral joint for 3 mins  -Assisted hamstrings stretch 4 x 15 secs  -SKTC wit conservative and      Charges:  ther ex x 2, ( 25 mins)  Manual (15 mins)    Total Timed Treatment: 40min  Total Treatment Time: 40  min

## 2020-03-03 ENCOUNTER — APPOINTMENT (OUTPATIENT)
Dept: PHYSICAL THERAPY | Facility: HOSPITAL | Age: 65
End: 2020-03-03
Attending: INTERNAL MEDICINE
Payer: COMMERCIAL

## 2020-03-03 ENCOUNTER — TELEPHONE (OUTPATIENT)
Dept: PHYSICAL THERAPY | Facility: HOSPITAL | Age: 65
End: 2020-03-03

## 2020-03-10 ENCOUNTER — APPOINTMENT (OUTPATIENT)
Dept: PHYSICAL THERAPY | Facility: HOSPITAL | Age: 65
End: 2020-03-10
Attending: INTERNAL MEDICINE
Payer: COMMERCIAL

## 2020-03-14 DIAGNOSIS — E78.00 HYPERCHOLESTEROLEMIA: ICD-10-CM

## 2020-03-14 RX ORDER — ATORVASTATIN CALCIUM 10 MG/1
TABLET, FILM COATED ORAL
Qty: 90 TABLET | Refills: 1 | Status: SHIPPED | OUTPATIENT
Start: 2020-03-14 | End: 2020-09-17

## 2020-03-15 NOTE — TELEPHONE ENCOUNTER
Refill passed per [facility], [facility] protocol.     Requested Prescriptions   Pending Prescriptions Disp Refills   • ATORVASTATIN 10 MG Oral Tab [Pharmacy Med Name: ATORVASTATIN 10MG TABLETS] 90 tablet 1     Sig: TAKE 1 TABLET BY MOUTH NIGHTLY       Cholesterol

## 2020-03-25 ENCOUNTER — TELEPHONE (OUTPATIENT)
Dept: PHYSICAL THERAPY | Facility: HOSPITAL | Age: 65
End: 2020-03-25

## 2020-03-25 NOTE — TELEPHONE ENCOUNTER
Left VM message for pt to inform her of department's initiative to protect all non-essential and high risk patients from COVID-19 exposure. Left message that the clinic is cancelling visit for the next two weeks.  Future appts are to be determined on a wee

## 2020-03-26 ENCOUNTER — TELEPHONE (OUTPATIENT)
Dept: PHYSICAL THERAPY | Facility: HOSPITAL | Age: 65
End: 2020-03-26

## 2020-03-27 NOTE — TELEPHONE ENCOUNTER
Left verbal message with patient regarding request if patient would like to be kept on schedule for 4/15/20 and if he wishes to complete remaining 3 appts which will require extending referral after 4/16/20.  Will attempt to reach patient as had to leave VM

## 2020-03-30 ENCOUNTER — APPOINTMENT (OUTPATIENT)
Dept: PHYSICAL THERAPY | Facility: HOSPITAL | Age: 65
End: 2020-03-30
Attending: INTERNAL MEDICINE
Payer: COMMERCIAL

## 2020-04-02 ENCOUNTER — TELEPHONE (OUTPATIENT)
Dept: PHYSICAL THERAPY | Facility: HOSPITAL | Age: 65
End: 2020-04-02

## 2020-04-07 ENCOUNTER — TELEPHONE (OUTPATIENT)
Dept: PHYSICAL THERAPY | Facility: HOSPITAL | Age: 65
End: 2020-04-07

## 2020-04-07 NOTE — TELEPHONE ENCOUNTER
Left VM message for pt to inform her of department's initiative to protect all non-essential and high risk patients from COVID-19 exposure. Left message that the clinic is cancelling visits for the next three weeks which would effect his only appt.   Reque

## 2020-04-13 ENCOUNTER — TELEPHONE (OUTPATIENT)
Dept: PHYSICAL THERAPY | Facility: HOSPITAL | Age: 65
End: 2020-04-13

## 2020-04-13 NOTE — TELEPHONE ENCOUNTER
Left message again that therapist would attempt to extend referral for PT and that Patient can attempt to make some additional appts around mid May if covid 19 situation at point that patient feels comfortable and administration of [facility] allows for.   Left sc

## 2020-04-15 ENCOUNTER — APPOINTMENT (OUTPATIENT)
Dept: PHYSICAL THERAPY | Facility: HOSPITAL | Age: 65
End: 2020-04-15
Attending: INTERNAL MEDICINE
Payer: COMMERCIAL

## 2020-04-15 ENCOUNTER — TELEPHONE (OUTPATIENT)
Dept: PHYSICAL THERAPY | Facility: HOSPITAL | Age: 65
End: 2020-04-15

## 2020-05-03 ENCOUNTER — PATIENT MESSAGE (OUTPATIENT)
Dept: PODIATRY CLINIC | Facility: CLINIC | Age: 65
End: 2020-05-03

## 2020-05-03 ENCOUNTER — PATIENT MESSAGE (OUTPATIENT)
Dept: INTERNAL MEDICINE CLINIC | Facility: CLINIC | Age: 65
End: 2020-05-03

## 2020-05-05 NOTE — TELEPHONE ENCOUNTER
From: Charleen Farfan  To:  Josh Akbar DPM  Sent: 5/3/2020 4:30 PM CDT  Subject: Visit Follow-up Question    Did we reschedual an appointment ???  Thank you  Jamie +

## 2020-05-06 ENCOUNTER — TELEPHONE (OUTPATIENT)
Dept: PHYSICAL THERAPY | Facility: HOSPITAL | Age: 65
End: 2020-05-06

## 2020-05-06 NOTE — TELEPHONE ENCOUNTER
Left VM with patient that due to continuing Covid 19 [facility] 's leadership team has directed us to call patient with treatment/eval options.   Explained possible options of:  1.) scheduling a f/u re-eval with HEP upgrade,  2.) scheduling re-eval with f/u visits

## 2020-05-07 NOTE — TELEPHONE ENCOUNTER
Received  call back after sending telephone encounter routing to you and patient decided he wants to continue sheltering in place and hold PT until at least 5/30/20 so he may call at that time but  not to expect a call to schedule him until he calls us.

## 2020-05-12 NOTE — TELEPHONE ENCOUNTER
S/w pt and he states he needs an appt - states he was referred d/t having pain in multiple joints and pcp thinks it is d/t his feet.  He states he has had orthotics in the past. Made appt on 6/10 @ 4pm

## 2020-05-19 ENCOUNTER — APPOINTMENT (OUTPATIENT)
Dept: PHYSICAL THERAPY | Facility: HOSPITAL | Age: 65
End: 2020-05-19
Attending: INTERNAL MEDICINE
Payer: COMMERCIAL

## 2020-05-21 ENCOUNTER — APPOINTMENT (OUTPATIENT)
Dept: PHYSICAL THERAPY | Facility: HOSPITAL | Age: 65
End: 2020-05-21
Attending: INTERNAL MEDICINE
Payer: COMMERCIAL

## 2020-05-27 ENCOUNTER — APPOINTMENT (OUTPATIENT)
Dept: PHYSICAL THERAPY | Facility: HOSPITAL | Age: 65
End: 2020-05-27
Attending: INTERNAL MEDICINE
Payer: COMMERCIAL

## 2020-05-27 NOTE — TELEPHONE ENCOUNTER
Patient notified and instructed via [facility]:    Per : labs stable. No medicine changes made. Repeat labs due on 6/22/20. Thanks.   Please see today's condition update encounter

## 2020-06-02 ENCOUNTER — APPOINTMENT (OUTPATIENT)
Dept: PHYSICAL THERAPY | Facility: HOSPITAL | Age: 65
End: 2020-06-02
Attending: INTERNAL MEDICINE
Payer: COMMERCIAL

## 2020-06-04 ENCOUNTER — APPOINTMENT (OUTPATIENT)
Dept: PHYSICAL THERAPY | Facility: HOSPITAL | Age: 65
End: 2020-06-04
Attending: INTERNAL MEDICINE
Payer: COMMERCIAL

## 2020-06-09 ENCOUNTER — APPOINTMENT (OUTPATIENT)
Dept: PHYSICAL THERAPY | Facility: HOSPITAL | Age: 65
End: 2020-06-09
Attending: INTERNAL MEDICINE
Payer: COMMERCIAL

## 2020-06-10 ENCOUNTER — OFFICE VISIT (OUTPATIENT)
Dept: PODIATRY CLINIC | Facility: CLINIC | Age: 65
End: 2020-06-10

## 2020-06-10 DIAGNOSIS — M72.2 PLANTAR FASCIITIS, BILATERAL: Primary | ICD-10-CM

## 2020-06-10 PROCEDURE — 99213 OFFICE O/P EST LOW 20 MIN: CPT | Performed by: PODIATRIST

## 2020-06-10 NOTE — PROGRESS NOTES
HPI:    Patient ID: Bienvenido Eldridge is a 75year old male. 58-year-old male presents for orthotic evaluation. I been treating this patient with a device over an extended period of time. He has been wearing these devices for approximately 2-1/2 years. difference then we will seek approval for a permanent orthotic. He is well-informed         ASSESSMENT/PLAN:   Plantar fasciitis, bilateral  (primary encounter diagnosis)    No orders of the defined types were placed in this encounter.       Meds This Visi

## 2020-06-11 ENCOUNTER — APPOINTMENT (OUTPATIENT)
Dept: PHYSICAL THERAPY | Facility: HOSPITAL | Age: 65
End: 2020-06-11
Attending: INTERNAL MEDICINE
Payer: COMMERCIAL

## 2020-08-18 ENCOUNTER — TELEPHONE (OUTPATIENT)
Dept: GASTROENTEROLOGY | Facility: CLINIC | Age: 65
End: 2020-08-18

## 2020-08-18 NOTE — TELEPHONE ENCOUNTER
----- Message from Opal Doss RN sent at 9/22/2015  6:17 PM CDT -----  Regarding: Recall Colon  Recall colon in 5 years per GS.  Colon done 9/18/15

## 2020-08-21 NOTE — LETTER
6/18/2025        ANGELA Malhotra        [address]        [facility] [address]         To Whom It May Concern,    ANGELA Malhotra is under my medical care.    He has been experiencing vision changes. He was evaluated at [facility] and was diagnosed with cataracts and extensive glaucoma.   In order to better understand his treatment options, it was advised that he seek a second opinion per [facility], which is listed as in-network.  He was referred to Dr. Higginbotham for reevaluation of his eye health and management options.    It is medically necessary for ANGELA Malhotra to complete further examination by Dr. Higginbotham / [facility] to continue managing his vision and overall eye health.    If you require any more information, please contact our office.      Sincerely,      Amaris Henley MD  [facility] [address]  Ph: [phone]  Fax: [phone]        Document electronically generated by:  Amaris Henley MD    Negative

## 2020-08-26 ENCOUNTER — OFFICE VISIT (OUTPATIENT)
Dept: PODIATRY CLINIC | Facility: CLINIC | Age: 65
End: 2020-08-26

## 2020-08-26 ENCOUNTER — OFFICE VISIT (OUTPATIENT)
Dept: INTERNAL MEDICINE CLINIC | Facility: CLINIC | Age: 65
End: 2020-08-26

## 2020-08-26 VITALS
WEIGHT: 231 LBS | SYSTOLIC BLOOD PRESSURE: 113 MMHG | HEART RATE: 79 BPM | DIASTOLIC BLOOD PRESSURE: 76 MMHG | RESPIRATION RATE: 18 BRPM | HEIGHT: 71 IN | BODY MASS INDEX: 32.34 KG/M2

## 2020-08-26 DIAGNOSIS — T78.40XA ALLERGY, INITIAL ENCOUNTER: ICD-10-CM

## 2020-08-26 DIAGNOSIS — Z00.00 ANNUAL PHYSICAL EXAM: ICD-10-CM

## 2020-08-26 DIAGNOSIS — Z12.11 ENCOUNTER FOR SCREENING COLONOSCOPY: ICD-10-CM

## 2020-08-26 DIAGNOSIS — M72.2 PLANTAR FASCIITIS: Primary | ICD-10-CM

## 2020-08-26 DIAGNOSIS — E78.00 HYPERCHOLESTEROLEMIA: ICD-10-CM

## 2020-08-26 DIAGNOSIS — M72.2 PLANTAR FASCIITIS, BILATERAL: ICD-10-CM

## 2020-08-26 DIAGNOSIS — Z98.890 HX OF COLONOSCOPY: Primary | ICD-10-CM

## 2020-08-26 PROCEDURE — 3078F DIAST BP <80 MM HG: CPT | Performed by: NURSE PRACTITIONER

## 2020-08-26 PROCEDURE — 90471 IMMUNIZATION ADMIN: CPT | Performed by: NURSE PRACTITIONER

## 2020-08-26 PROCEDURE — 90732 PPSV23 VACC 2 YRS+ SUBQ/IM: CPT | Performed by: NURSE PRACTITIONER

## 2020-08-26 PROCEDURE — 99213 OFFICE O/P EST LOW 20 MIN: CPT | Performed by: PODIATRIST

## 2020-08-26 PROCEDURE — 99213 OFFICE O/P EST LOW 20 MIN: CPT | Performed by: NURSE PRACTITIONER

## 2020-08-26 PROCEDURE — 3008F BODY MASS INDEX DOCD: CPT | Performed by: NURSE PRACTITIONER

## 2020-08-26 PROCEDURE — 3074F SYST BP LT 130 MM HG: CPT | Performed by: NURSE PRACTITIONER

## 2020-08-26 NOTE — PROGRESS NOTES
HPI:    Patient ID: Sixto Valderrama is a 75year old male. 39-year-old male presents to the office having not been seen in a couple of months. He is here for follow-up in reference to the modification of his old orthotic.   He states when I modified th

## 2020-08-26 NOTE — ASSESSMENT & PLAN NOTE
A/P 75year-old pleasant male with complaints of chronic postnasal drip especially around this time of the year. We discussed the possibilities of allergies. Plan  1) Patient will start Zyrtec 10 mg p.o. nightly to see if there is an improvement.

## 2020-08-26 NOTE — PROGRESS NOTES
HPI:    Patient ID: Marquise Thomas is a 75year old male.     Immunization History  Administered            Date(s) Administered    FLU VAC High Dose 65 YRS & Older PRSV Free (96435)                          11/18/2014      FLUAD High Dose 72 yr and older Procedure Laterality Date   • Tonsillectomy        Social History    Socioeconomic History      Marital status: Single      Spouse name: Not on file      Number of children: Not on file      Years of education: Not on file      Highest education level: N 220 mg by mouth. Allergies:  Penicillins                 Comment:Other reaction(s): Unknown   PHYSICAL EXAM:   Physical Exam    Constitutional: He is oriented to person, place, and time. He appears well-developed and well-nourished.    HENT:   Head: N High: 200-499 mg/dL   Very High: >=500 mg/dL        LDL Cholesterol  <100 mg/dL 87    Comment:    Desirable <100 mg/dL   Borderline 100-129 mg/dL   High     >=130mg/dL      VLDL  0 - 30 mg/dL 54High     Non HDL Chol  <130 mg/dL 141High     Comment:    [facility]. Imaging & Referrals:  EVALUATE & TREAT, GASTRO (INTERNAL)  PNEUMOCOCCAL IMM (PNEUMOVAX)     Patient is due for colonoscopy 2020/ referral given.   Orders placed    Amber Rogers, DASIA

## 2020-08-26 NOTE — ASSESSMENT & PLAN NOTE
Cholesterol, Total  <200 mg/dL 202High     Comment:    Desirable  <200 mg/dL   Borderline  200-239 mg/dL   High      >=240 mg/dL      HDL Cholesterol  40 - 59 mg/dL 61High         Comment:    Interpretive Information:   An HDL cholesterol <40 mg/dL is low

## 2020-08-31 ENCOUNTER — TELEPHONE (OUTPATIENT)
Dept: INTERNAL MEDICINE CLINIC | Facility: CLINIC | Age: 65
End: 2020-08-31

## 2020-08-31 NOTE — TELEPHONE ENCOUNTER
Patient is requesting an annual physical appointment with Dr. Ilia Hawk at the [facility]. Next available appointment is not until 12/08/2020, however, patient wants a sooner appointment available.

## 2020-09-01 NOTE — TELEPHONE ENCOUNTER
Does he have an urgent problem? If so I can see him for that issue. Does he need refills? If so, I can refill his medications until his scheduled appointment. Unfortunately, due to COVID-19 there is quite a backlog of patients. We are not overloading the schedule in order to keep the waiting room from getting crowded. Additionally, I have cut my hours and I am only working part time. I recommend that he schedule his physical in December and put himself on a wait list for an earlier appointment. Since physicals are routine, I do not add them onto the schedule.

## 2020-09-16 ENCOUNTER — LAB ENCOUNTER (OUTPATIENT)
Dept: LAB | Facility: HOSPITAL | Age: 65
End: 2020-09-16
Attending: INTERNAL MEDICINE
Payer: COMMERCIAL

## 2020-09-16 DIAGNOSIS — R73.09 ELEVATED GLUCOSE: ICD-10-CM

## 2020-09-16 DIAGNOSIS — Z00.00 ANNUAL PHYSICAL EXAM: ICD-10-CM

## 2020-09-16 LAB
25(OH)D3 SERPL-MCNC: 14.8 NG/ML (ref 30–100)
ALBUMIN SERPL-MCNC: 3.4 G/DL (ref 3.4–5)
ALBUMIN/GLOB SERPL: 0.9 {RATIO} (ref 1–2)
ALP LIVER SERPL-CCNC: 104 U/L (ref 45–117)
ALT SERPL-CCNC: 30 U/L (ref 16–61)
ANION GAP SERPL CALC-SCNC: 4 MMOL/L (ref 0–18)
AST SERPL-CCNC: 21 U/L (ref 15–37)
BASOPHILS # BLD AUTO: 0.05 X10(3) UL (ref 0–0.2)
BASOPHILS NFR BLD AUTO: 1 %
BILIRUB SERPL-MCNC: 0.3 MG/DL (ref 0.1–2)
BUN BLD-MCNC: 14 MG/DL (ref 7–18)
BUN/CREAT SERPL: 14.3 (ref 10–20)
CALCIUM BLD-MCNC: 8.8 MG/DL (ref 8.5–10.1)
CHLORIDE SERPL-SCNC: 112 MMOL/L (ref 98–112)
CHOLEST SMN-MCNC: 184 MG/DL (ref ?–200)
CO2 SERPL-SCNC: 28 MMOL/L (ref 21–32)
COMPLEXED PSA SERPL-MCNC: 3.13 NG/ML (ref ?–4)
CREAT BLD-MCNC: 0.98 MG/DL (ref 0.7–1.3)
DEPRECATED RDW RBC AUTO: 45.7 FL (ref 35.1–46.3)
EOSINOPHIL # BLD AUTO: 0.17 X10(3) UL (ref 0–0.7)
EOSINOPHIL NFR BLD AUTO: 3.3 %
ERYTHROCYTE [DISTWIDTH] IN BLOOD BY AUTOMATED COUNT: 12.5 % (ref 11–15)
GLOBULIN PLAS-MCNC: 4 G/DL (ref 2.8–4.4)
GLUCOSE BLD-MCNC: 109 MG/DL (ref 70–99)
HCT VFR BLD AUTO: 45 % (ref 39–53)
HDLC SERPL-MCNC: 56 MG/DL (ref 40–59)
HGB BLD-MCNC: 15.4 G/DL (ref 13–17.5)
IMM GRANULOCYTES # BLD AUTO: 0.02 X10(3) UL (ref 0–1)
IMM GRANULOCYTES NFR BLD: 0.4 %
LDLC SERPL CALC-MCNC: 80 MG/DL (ref ?–100)
LYMPHOCYTES # BLD AUTO: 1.84 X10(3) UL (ref 1–4)
LYMPHOCYTES NFR BLD AUTO: 35.7 %
M PROTEIN MFR SERPL ELPH: 7.4 G/DL (ref 6.4–8.2)
MCH RBC QN AUTO: 34.1 PG (ref 26–34)
MCHC RBC AUTO-ENTMCNC: 34.2 G/DL (ref 31–37)
MCV RBC AUTO: 99.6 FL (ref 80–100)
MONOCYTES # BLD AUTO: 0.6 X10(3) UL (ref 0.1–1)
MONOCYTES NFR BLD AUTO: 11.7 %
NEUTROPHILS # BLD AUTO: 2.47 X10 (3) UL (ref 1.5–7.7)
NEUTROPHILS # BLD AUTO: 2.47 X10(3) UL (ref 1.5–7.7)
NEUTROPHILS NFR BLD AUTO: 47.9 %
NONHDLC SERPL-MCNC: 128 MG/DL (ref ?–130)
OSMOLALITY SERPL CALC.SUM OF ELEC: 299 MOSM/KG (ref 275–295)
PATIENT FASTING Y/N/NP: YES
PATIENT FASTING Y/N/NP: YES
PLATELET # BLD AUTO: 175 10(3)UL (ref 150–450)
POTASSIUM SERPL-SCNC: 4.3 MMOL/L (ref 3.5–5.1)
RBC # BLD AUTO: 4.52 X10(6)UL (ref 3.8–5.8)
SODIUM SERPL-SCNC: 144 MMOL/L (ref 136–145)
TRIGL SERPL-MCNC: 242 MG/DL (ref 30–149)
TSI SER-ACNC: 1.91 MIU/ML (ref 0.36–3.74)
VIT B12 SERPL-MCNC: 366 PG/ML (ref 193–986)
VLDLC SERPL CALC-MCNC: 48 MG/DL (ref 0–30)
WBC # BLD AUTO: 5.2 X10(3) UL (ref 4–11)

## 2020-09-16 PROCEDURE — 82607 VITAMIN B-12: CPT

## 2020-09-16 PROCEDURE — 36415 COLL VENOUS BLD VENIPUNCTURE: CPT

## 2020-09-16 PROCEDURE — 80053 COMPREHEN METABOLIC PANEL: CPT

## 2020-09-16 PROCEDURE — 80061 LIPID PANEL: CPT

## 2020-09-16 PROCEDURE — 84443 ASSAY THYROID STIM HORMONE: CPT

## 2020-09-16 PROCEDURE — 82306 VITAMIN D 25 HYDROXY: CPT

## 2020-09-16 PROCEDURE — 85025 COMPLETE CBC W/AUTO DIFF WBC: CPT

## 2020-09-16 PROCEDURE — 83036 HEMOGLOBIN GLYCOSYLATED A1C: CPT

## 2020-09-17 ENCOUNTER — OFFICE VISIT (OUTPATIENT)
Dept: INTERNAL MEDICINE CLINIC | Facility: CLINIC | Age: 65
End: 2020-09-17

## 2020-09-17 VITALS
WEIGHT: 234.5 LBS | SYSTOLIC BLOOD PRESSURE: 130 MMHG | DIASTOLIC BLOOD PRESSURE: 80 MMHG | BODY MASS INDEX: 32.83 KG/M2 | HEART RATE: 76 BPM | HEIGHT: 71 IN

## 2020-09-17 DIAGNOSIS — Z23 NEED FOR VACCINATION: ICD-10-CM

## 2020-09-17 DIAGNOSIS — Z12.11 SCREEN FOR COLON CANCER: ICD-10-CM

## 2020-09-17 DIAGNOSIS — E78.00 HYPERCHOLESTEROLEMIA: Primary | ICD-10-CM

## 2020-09-17 DIAGNOSIS — M72.2 PLANTAR FASCIITIS: ICD-10-CM

## 2020-09-17 DIAGNOSIS — R73.09 ELEVATED GLUCOSE: ICD-10-CM

## 2020-09-17 DIAGNOSIS — E55.9 VITAMIN D DEFICIENCY: ICD-10-CM

## 2020-09-17 PROBLEM — R21 RASH: Status: RESOLVED | Noted: 2019-12-12 | Resolved: 2020-09-17

## 2020-09-17 LAB
EST. AVERAGE GLUCOSE BLD GHB EST-MCNC: 114 MG/DL (ref 68–126)
HBA1C MFR BLD HPLC: 5.6 % (ref ?–5.7)

## 2020-09-17 PROCEDURE — 3079F DIAST BP 80-89 MM HG: CPT | Performed by: INTERNAL MEDICINE

## 2020-09-17 PROCEDURE — 90471 IMMUNIZATION ADMIN: CPT | Performed by: INTERNAL MEDICINE

## 2020-09-17 PROCEDURE — 3008F BODY MASS INDEX DOCD: CPT | Performed by: INTERNAL MEDICINE

## 2020-09-17 PROCEDURE — 99214 OFFICE O/P EST MOD 30 MIN: CPT | Performed by: INTERNAL MEDICINE

## 2020-09-17 PROCEDURE — 3075F SYST BP GE 130 - 139MM HG: CPT | Performed by: INTERNAL MEDICINE

## 2020-09-17 PROCEDURE — 90662 IIV NO PRSV INCREASED AG IM: CPT | Performed by: INTERNAL MEDICINE

## 2020-09-17 RX ORDER — ATORVASTATIN CALCIUM 10 MG/1
10 TABLET, FILM COATED ORAL NIGHTLY
Qty: 90 TABLET | Refills: 1 | Status: SHIPPED | OUTPATIENT
Start: 2020-09-17 | End: 2021-03-16

## 2020-09-17 NOTE — PATIENT INSTRUCTIONS
Your Vitamin D level was low. I recommend that you buy over-the-counter Vitamin D and take 5000 units daily. Your Vitamin B12 level was low. I recommend that you buy over-the-counter Vitamin B12 and take 500 mcg daily.       Please schedule a nurse vis

## 2020-09-17 NOTE — ASSESSMENT & PLAN NOTE
I reviewed the patient's recent labs with him. His vitamin D level is low. I advised him to take over-the-counter vitamin D. We can recheck the level in 3 to 6 months.

## 2020-09-17 NOTE — PROGRESS NOTES
HPI:    Patient ID: Chapito Barajas is a 75year old male. Pt had colon polyps in the past, but his last colonoscopy in 2015 was normal.  He needs to see Dr. Ashley Marshall for his plantar fasciitis. He had labs recently.   His cholesterol was good, but his vi Cardiovascular: Negative for chest pain and palpitations. Musculoskeletal: Positive for joint pain. Negative for myalgias.        ./80 (BP Location: Right arm, Patient Position: Sitting, Cuff Size: large)   Pulse 76   Ht 5' 11\" (1.803 m)   Wt 234 IM NJX        Patient will return for the shingles vaccine at a later date. The patient expressed understanding and agreed with the plan.     Meds This Visit:  Requested Prescriptions     Signed Prescriptions Disp Refills   • atorvastatin 10 MG Oral Tab

## 2020-09-17 NOTE — ASSESSMENT & PLAN NOTE
Reviewed the patient's recent labs. His cholesterol is well controlled. Continue atorvastatin at current dose.

## 2020-09-17 NOTE — ASSESSMENT & PLAN NOTE
Although his last colonoscopy was normal, patient had colon polyps on a previous colonoscopy. I reviewed the gastroenterologist notes who states that he is due for a colonoscopy this year.

## 2020-10-06 ENCOUNTER — NURSE ONLY (OUTPATIENT)
Dept: GASTROENTEROLOGY | Facility: CLINIC | Age: 65
End: 2020-10-06

## 2020-10-06 DIAGNOSIS — Z86.010 PERSONAL HISTORY OF COLONIC POLYPS: Primary | ICD-10-CM

## 2020-10-06 RX ORDER — POLYETHYLENE GLYCOL 3350, SODIUM CHLORIDE, SODIUM BICARBONATE, POTASSIUM CHLORIDE 420; 11.2; 5.72; 1.48 G/4L; G/4L; G/4L; G/4L
POWDER, FOR SOLUTION ORAL
Qty: 1 BOTTLE | Refills: 0 | Status: ON HOLD | OUTPATIENT
Start: 2020-10-06 | End: 2021-03-08 | Stop reason: ALTCHOICE

## 2020-10-06 NOTE — PROGRESS NOTES
Last Procedure, Date, MD:  09/18/2015, colonoscopy, [place]  Last Diagnosis:  Personal hx of adenomatous polyp  Recalled for (mth/yrs): 5 years  Sedation used previously:  IV  Last Prep Used (if known):  MiraLax/Gatorade  Quality of prep (if known): v

## 2020-10-06 NOTE — PROGRESS NOTES
The patient's chart has been reviewed. Okay to schedule pt for 5 year colonoscopy recall r/t history of colon polyps with Dr. La Harmon.      Advise IV twilight or MAC sedation with split dose Colyte/TriLyte or equivalent(eRx) preparation.   -Eligible fo

## 2020-10-06 NOTE — PROGRESS NOTES
Bridgette Suazo  MR #:   [id number] :   1955 [facility]  PHYSICIAN:  Henry Scott M.D. Operative Report    DATE OF PROCEDURE: 2015    PREOPERATIVE DIAGNOSIS:    Personal history of adenomatous colon polyps.        POSTOPERATIVE LARISA measuring  5-6 mm in size and the other in mid transverse colon measuring 4-5 mm in size. Each was cold snare excised and retrieved via suction. No ongoing bleeding.   In the distal/proximal sigmoid colon at about 55 cm upon withdrawal the site of previou

## 2020-10-12 NOTE — PROGRESS NOTES
Scheduled for:  Colonoscopy - 55054  Provider Name:  Dr. Royal Polanco  Date:  3/8/21 - per pt's request  Location:  [address] [place] [address]  Sedation:  IV  Time:  10:00 am (pt is aware to arrive at 9:00 am)  Prep:  Trilyte, Prep instructions were given to pt over the phone, p

## 2020-10-12 NOTE — PROGRESS NOTES
Spoke with pt, he would like to schedule on a Monday in March 2021. Pt's girlfriend will be flying in from out of town to drive him. Pt will speak with her & will CB to schedule. Please transfer to Katherine Mendez at ext [phone] for scheduling.

## 2020-10-27 ENCOUNTER — TELEPHONE (OUTPATIENT)
Dept: PODIATRY CLINIC | Facility: CLINIC | Age: 65
End: 2020-10-27

## 2020-10-27 ENCOUNTER — OFFICE VISIT (OUTPATIENT)
Dept: PODIATRY CLINIC | Facility: CLINIC | Age: 65
End: 2020-10-27

## 2020-10-27 DIAGNOSIS — M72.2 PLANTAR FASCIITIS, BILATERAL: Primary | ICD-10-CM

## 2020-10-27 PROCEDURE — L3000 FT INSERT UCB BERKELEY SHELL: HCPCS | Performed by: PODIATRIST

## 2020-10-27 PROCEDURE — 29799 UNLISTED PX CASTING/STRPG: CPT | Performed by: PODIATRIST

## 2020-10-27 NOTE — PROGRESS NOTES
HPI:    Patient ID: Tana Khan is a 77year old male. 69-year-old male presents for long-term orthotic control. Approval has been received for this treatment.   An impression was taken of both feet in the subtalar joint neutral position without inci

## 2020-10-28 ENCOUNTER — NURSE ONLY (OUTPATIENT)
Dept: INTERNAL MEDICINE CLINIC | Facility: CLINIC | Age: 65
End: 2020-10-28

## 2020-10-28 ENCOUNTER — TELEPHONE (OUTPATIENT)
Dept: INTERNAL MEDICINE CLINIC | Facility: CLINIC | Age: 65
End: 2020-10-28

## 2020-10-28 DIAGNOSIS — Z23 IMMUNIZATION DUE: Primary | ICD-10-CM

## 2020-10-28 DIAGNOSIS — Z23 NEED FOR VACCINATION: Primary | ICD-10-CM

## 2020-10-28 PROCEDURE — 90471 IMMUNIZATION ADMIN: CPT | Performed by: INTERNAL MEDICINE

## 2020-10-28 PROCEDURE — 90750 HZV VACC RECOMBINANT IM: CPT | Performed by: INTERNAL MEDICINE

## 2020-10-28 NOTE — TELEPHONE ENCOUNTER
Please advise. Patient is here today for shingrix, ok to give?       Immunization History  Administered            Date(s) Administered    FLU VAC High Dose 65 YRS & Older PRSV Free (78620)                          11/18/2014 09/17/2020      FLUAD High Do

## 2020-10-28 NOTE — PROGRESS NOTES
Patient came in for their scheduled nurse visit. Ordered per Lc Newman on 20 and verified. Patient verbalized name and . Patient responded well to injection given on left deltoid via IM, no reaction noted. VIS was given.  Instructed patient to

## 2020-12-01 NOTE — TELEPHONE ENCOUNTER
Called and left pt a VM to inform them orthotics are ready to be picked up. When pt calls back please schedule an appt with  at [address] to do so. Thank You.

## 2020-12-20 ENCOUNTER — HOSPITAL ENCOUNTER (OUTPATIENT)
Age: 65
Discharge: HOME | End: 2020-12-20
Payer: COMMERCIAL

## 2020-12-20 VITALS
BODY MASS INDEX: 31 KG/M2 | HEART RATE: 90 BPM | WEIGHT: 220 LBS | OXYGEN SATURATION: 100 % | RESPIRATION RATE: 16 BRPM | DIASTOLIC BLOOD PRESSURE: 65 MMHG | SYSTOLIC BLOOD PRESSURE: 101 MMHG | TEMPERATURE: 97 F

## 2020-12-20 DIAGNOSIS — L30.9 DERMATITIS: Primary | ICD-10-CM

## 2020-12-20 PROCEDURE — 99203 OFFICE O/P NEW LOW 30 MIN: CPT | Performed by: NURSE PRACTITIONER

## 2020-12-20 NOTE — ED PROVIDER NOTES
Patient Seen in: [facility]      History   Patient presents with:  Rash Skin Problem    Stated Complaint: skin problem    HPI    This is a 49-year-old male presenting for rash on the neck.   Patient states, that he has had a rash on the front Appearance: Normal appearance. HENT:      Head: Normocephalic.       Right Ear: External ear normal.      Left Ear: External ear normal.      Nose: Nose normal.      Mouth/Throat:      Mouth: Mucous membranes are moist.      Pharynx: Oropharynx is viv Indy Blevins MD  [address] [place] [address]  [phone]          Elaine Osborn, [address] [place] [id number] [phone]    Schedule an appointment as soon as possible for a visit   Follow-up with your dermatologist o

## 2020-12-20 NOTE — ED INITIAL ASSESSMENT (HPI)
Pt states that he has had a rash on his anterior neck that started 3-4 weeks ago with itching. Pt broke out with another patch on the back of his neck 2 days ago.

## 2020-12-22 ENCOUNTER — OFFICE VISIT (OUTPATIENT)
Dept: PODIATRY CLINIC | Facility: CLINIC | Age: 65
End: 2020-12-22

## 2020-12-22 VITALS — HEIGHT: 72 IN | WEIGHT: 220 LBS | BODY MASS INDEX: 29.8 KG/M2

## 2020-12-22 DIAGNOSIS — M72.2 PLANTAR FASCIITIS, BILATERAL: Primary | ICD-10-CM

## 2020-12-22 PROCEDURE — 3008F BODY MASS INDEX DOCD: CPT | Performed by: PODIATRIST

## 2020-12-22 NOTE — PROGRESS NOTES
HPI:    Patient ID: Joy Salvador is a 77year old male. A 7year-old male presents for dispensing of new orthoses. They appear to be of proper fit I do not anticipate areas of irritation.   Based on the fact that he has been wearing a device I encou

## 2020-12-29 ENCOUNTER — TELEPHONE (OUTPATIENT)
Dept: INTERNAL MEDICINE CLINIC | Facility: CLINIC | Age: 65
End: 2020-12-29

## 2020-12-29 NOTE — TELEPHONE ENCOUNTER
Advised patient that we are following guidance from the 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

## 2020-12-29 NOTE — TELEPHONE ENCOUNTER
Patient is insisting that he be informed when the COVID vaccine is available. Patient is requesting a call back from Dr. Manuel Foote.

## 2020-12-29 NOTE — TELEPHONE ENCOUNTER
Called pt and attempted to discuss availability of covid vaccine and was difficult to hear pt. Call was disconnected.

## 2021-01-07 ENCOUNTER — NURSE ONLY (OUTPATIENT)
Dept: INTERNAL MEDICINE CLINIC | Facility: CLINIC | Age: 66
End: 2021-01-07
Payer: COMMERCIAL

## 2021-01-07 DIAGNOSIS — Z23 IMMUNIZATION DUE: Primary | ICD-10-CM

## 2021-01-07 PROCEDURE — 90471 IMMUNIZATION ADMIN: CPT | Performed by: INTERNAL MEDICINE

## 2021-01-07 PROCEDURE — 90750 HZV VACC RECOMBINANT IM: CPT | Performed by: INTERNAL MEDICINE

## 2021-01-07 NOTE — PROGRESS NOTES
Patient is here today for their scheduled second Shingrix injection. First injection was given on 10/28/20. Patient verbalized name and . Patient responded well to injection Shingrix 0.5-mL given on left deltoid via IM, no reaction noted. VIS was given.

## 2021-01-28 ENCOUNTER — PATIENT MESSAGE (OUTPATIENT)
Dept: INTERNAL MEDICINE CLINIC | Facility: CLINIC | Age: 66
End: 2021-01-28

## 2021-01-28 ENCOUNTER — TELEPHONE (OUTPATIENT)
Dept: INTERNAL MEDICINE CLINIC | Facility: CLINIC | Age: 66
End: 2021-01-28

## 2021-01-28 DIAGNOSIS — R21 RASH: Primary | ICD-10-CM

## 2021-01-28 NOTE — TELEPHONE ENCOUNTER
----- Message from Tiana Rafael sent at 1/28/2021 10:08 AM CST -----  Regarding: Non-Urgent Medical Question  Contact: [phone]  Hi   Wondering about covid vaccine as I’m in 1b group  Also  Preparations for March colon test.  I believe I’m to do pr

## 2021-01-28 NOTE — TELEPHONE ENCOUNTER
From: Charleen Farfan  To: DASIA Huang  Sent: 1/28/2021 10:08 AM CST  Subject: Non-Urgent Medical Question    Hi   Wondering about covid vaccine as I’m in 1b group  Also  Preparations for March colon test. I believe I’m to do preps? ?  And have covid

## 2021-01-29 NOTE — TELEPHONE ENCOUNTER
Provided information regarding the Covid vaccine to patient. Patient also asking for a referral for Dermatology as the rash on his neck has not improved much. Last seen for this on 12/20 in urgent care and used Hydrocortisone cream.    Referral pended.

## 2021-03-01 ENCOUNTER — TELEPHONE (OUTPATIENT)
Dept: GASTROENTEROLOGY | Facility: CLINIC | Age: 66
End: 2021-03-01

## 2021-03-02 RX ORDER — SODIUM CHLORIDE, SODIUM LACTATE, POTASSIUM CHLORIDE, CALCIUM CHLORIDE 600; 310; 30; 20 MG/100ML; MG/100ML; MG/100ML; MG/100ML
INJECTION, SOLUTION INTRAVENOUS CONTINUOUS
Status: CANCELLED | OUTPATIENT
Start: 2021-03-02

## 2021-03-02 NOTE — PAT NURSING NOTE
Pt.offered multiple times and locations for Covid test for Friday, as this is the preferred day for testing for a colonoscopy to ensure we get results by the time pt.needs to start prep.  Pt.also informed of Saturday morning for Covid test but that we don't

## 2021-03-05 ENCOUNTER — LAB ENCOUNTER (OUTPATIENT)
Dept: LAB | Facility: HOSPITAL | Age: 66
End: 2021-03-05
Attending: INTERNAL MEDICINE
Payer: COMMERCIAL

## 2021-03-05 DIAGNOSIS — Z01.818 PRE-OP TESTING: ICD-10-CM

## 2021-03-05 LAB — SARS-COV-2 RNA RESP QL NAA+PROBE: NOT DETECTED

## 2021-03-08 ENCOUNTER — ANESTHESIA (OUTPATIENT)
Dept: ENDOSCOPY | Facility: HOSPITAL | Age: 66
End: 2021-03-08
Payer: COMMERCIAL

## 2021-03-08 ENCOUNTER — HOSPITAL ENCOUNTER (OUTPATIENT)
Facility: HOSPITAL | Age: 66
Setting detail: HOSPITAL OUTPATIENT SURGERY
Discharge: HOME | End: 2021-03-08
Attending: INTERNAL MEDICINE | Admitting: INTERNAL MEDICINE
Payer: COMMERCIAL

## 2021-03-08 ENCOUNTER — ANESTHESIA EVENT (OUTPATIENT)
Dept: ENDOSCOPY | Facility: HOSPITAL | Age: 66
End: 2021-03-08
Payer: COMMERCIAL

## 2021-03-08 VITALS
DIASTOLIC BLOOD PRESSURE: 74 MMHG | WEIGHT: 220 LBS | OXYGEN SATURATION: 96 % | HEIGHT: 72 IN | SYSTOLIC BLOOD PRESSURE: 123 MMHG | HEART RATE: 62 BPM | BODY MASS INDEX: 29.8 KG/M2 | RESPIRATION RATE: 13 BRPM | TEMPERATURE: 98 F

## 2021-03-08 DIAGNOSIS — Z86.010 PERSONAL HISTORY OF COLONIC POLYPS: ICD-10-CM

## 2021-03-08 DIAGNOSIS — Z01.818 PRE-OP TESTING: Primary | ICD-10-CM

## 2021-03-08 PROCEDURE — 45385 COLONOSCOPY W/LESION REMOVAL: CPT | Performed by: INTERNAL MEDICINE

## 2021-03-08 PROCEDURE — 0DBK8ZX EXCISION OF ASCENDING COLON, VIA NATURAL OR ARTIFICIAL OPENING ENDOSCOPIC, DIAGNOSTIC: ICD-10-PCS | Performed by: INTERNAL MEDICINE

## 2021-03-08 PROCEDURE — 0DBM8ZX EXCISION OF DESCENDING COLON, VIA NATURAL OR ARTIFICIAL OPENING ENDOSCOPIC, DIAGNOSTIC: ICD-10-PCS | Performed by: INTERNAL MEDICINE

## 2021-03-08 RX ORDER — SODIUM CHLORIDE, SODIUM LACTATE, POTASSIUM CHLORIDE, CALCIUM CHLORIDE 600; 310; 30; 20 MG/100ML; MG/100ML; MG/100ML; MG/100ML
INJECTION, SOLUTION INTRAVENOUS CONTINUOUS
Status: DISCONTINUED | OUTPATIENT
Start: 2021-03-08 | End: 2021-03-08

## 2021-03-08 RX ORDER — SODIUM CHLORIDE 0.9 % (FLUSH) 0.9 %
10 SYRINGE (ML) INJECTION AS NEEDED
Status: DISCONTINUED | OUTPATIENT
Start: 2021-03-08 | End: 2021-03-08

## 2021-03-08 RX ORDER — LIDOCAINE HYDROCHLORIDE 10 MG/ML
INJECTION, SOLUTION EPIDURAL; INFILTRATION; INTRACAUDAL; PERINEURAL AS NEEDED
Status: DISCONTINUED | OUTPATIENT
Start: 2021-03-08 | End: 2021-03-08 | Stop reason: SURG

## 2021-03-08 RX ORDER — MIDAZOLAM HYDROCHLORIDE 1 MG/ML
1 INJECTION INTRAMUSCULAR; INTRAVENOUS EVERY 5 MIN PRN
Status: DISCONTINUED | OUTPATIENT
Start: 2021-03-08 | End: 2021-03-08

## 2021-03-08 RX ORDER — NALOXONE HYDROCHLORIDE 0.4 MG/ML
80 INJECTION, SOLUTION INTRAMUSCULAR; INTRAVENOUS; SUBCUTANEOUS AS NEEDED
Status: DISCONTINUED | OUTPATIENT
Start: 2021-03-08 | End: 2021-03-08

## 2021-03-08 RX ADMIN — LIDOCAINE HYDROCHLORIDE 50 MG: 10 INJECTION, SOLUTION EPIDURAL; INFILTRATION; INTRACAUDAL; PERINEURAL at 10:23:00

## 2021-03-08 RX ADMIN — SODIUM CHLORIDE, SODIUM LACTATE, POTASSIUM CHLORIDE, CALCIUM CHLORIDE: 600; 310; 30; 20 INJECTION, SOLUTION INTRAVENOUS at 11:07:00

## 2021-03-08 NOTE — ANESTHESIA PREPROCEDURE EVALUATION
Anesthesia PreOp Note    HPI:     Charleen Farfan is a 77year old male who presents for preoperative consultation requested by: Merari Olivas MD    Date of Surgery: 3/8/2021    Procedure(s):  COLONOSCOPY  Indication: Personal history of colonic p nightly., Disp: 90 tablet, Rfl: 1, 3/7/2021      Normal Saline Flush 0.9 % injection 10 mL, 10 mL, Intravenous, PRN, Rasheed Ragsdale MD  lactated ringers infusion, , Intravenous, Continuous, Quinn Plascencia MD, Last Rate: 125 mL/hr at 03/08/21 Grew up on a farm: Not Asked        History of tanning: No        Outdoor occupation: Not Asked        Reaction to local anesthetic: No    Social History Narrative      Not on file    Social Determinants of Health  Financial Resource Strain:       Tiffanieul Anesthesia Plan:   ASA:  2  Plan:   MAC  Informed Consent Plan and Risks Discussed With:  Patient  Discussed plan with:  Surgeon      I have informed Marquise Thomas and/or legal guardian or family member of the nature of the anesthetic plan, benefits,

## 2021-03-08 NOTE — OPERATIVE REPORT
[facility] Endoscopy Report      Date of Procedure:  03/08/21      Preoperative Diagnosis:  1. Colorectal cancer screening  2. Personal history of adenomatous colon polyps      Postoperative Diagnosis:  1. Colon polyps  2.   [address] diffuse tattoo and focal areas of white scar deformity in the descending colon at 55 cm without signs of recurrent polyp.   There were scattered diverticula seen from at least the proximal ascending colon to the proximal sigmoid without current signs of com

## 2021-03-08 NOTE — H&P
History & Physical Examination    Patient Name: Kriss Alvarado  MRN: [id number]  CSN: [id number]  YOB: 1955    Diagnosis: Colorectal cancer screening, personal history of adenomatous colon polyps      atorvastatin 10 MG Oral Tab, Take 1 tab alternatives with the patient/family. They understand and agree to proceed with plan of care. [ x ] I have reviewed the History and Physical done within the last 30 days. Any changes noted above.     Donna Valdez MD  3/8/2021  10:29 AM

## 2021-03-08 NOTE — ANESTHESIA POSTPROCEDURE EVALUATION
Patient: Nicole Kraus    Procedure Summary     Date: 03/08/21 Room / Location: [facility] ENDOSCOPY 04 / [facility] ENDOSCOPY    Anesthesia Start: 5882 Anesthesia Stop:     Procedure: COLONOSCOPY (N/A ) Diagnosis:       Personal history of colonic polyps      (diver

## 2021-03-10 ENCOUNTER — TELEPHONE (OUTPATIENT)
Dept: GASTROENTEROLOGY | Facility: CLINIC | Age: 66
End: 2021-03-10

## 2021-03-10 NOTE — TELEPHONE ENCOUNTER
----- Message from Nancy Koroma MD sent at 3/9/2021  6:06 PM CST -----  I spoke to Blount. He is feeling well. He had #1 subcentimeter sessile serrated adenoma removed. The other polyps were not adenomatous. I have discussed the significance.   I

## 2021-03-13 DIAGNOSIS — E78.00 HYPERCHOLESTEROLEMIA: ICD-10-CM

## 2021-03-13 NOTE — TELEPHONE ENCOUNTER
•  atorvastatin 10 MG Oral Tab, Take 1 tablet (10 mg total) by mouth nightly., Disp: 90 tablet, Rfl: 1

## 2021-03-16 ENCOUNTER — PATIENT MESSAGE (OUTPATIENT)
Dept: INTERNAL MEDICINE CLINIC | Facility: CLINIC | Age: 66
End: 2021-03-16

## 2021-03-16 RX ORDER — ATORVASTATIN CALCIUM 10 MG/1
10 TABLET, FILM COATED ORAL NIGHTLY
Qty: 90 TABLET | Refills: 0 | Status: SHIPPED | OUTPATIENT
Start: 2021-03-16 | End: 2021-06-09

## 2021-05-07 ENCOUNTER — TELEPHONE (OUTPATIENT)
Dept: DERMATOLOGY CLINIC | Facility: CLINIC | Age: 66
End: 2021-05-07

## 2021-05-07 ENCOUNTER — OFFICE VISIT (OUTPATIENT)
Dept: DERMATOLOGY CLINIC | Facility: CLINIC | Age: 66
End: 2021-05-07

## 2021-05-07 DIAGNOSIS — L30.9 DERMATITIS: ICD-10-CM

## 2021-05-07 DIAGNOSIS — B35.1 ONYCHOMYCOSIS: Primary | ICD-10-CM

## 2021-05-07 PROCEDURE — 99213 OFFICE O/P EST LOW 20 MIN: CPT | Performed by: PHYSICIAN ASSISTANT

## 2021-05-07 RX ORDER — MOMETASONE FUROATE 1 MG/G
1 OINTMENT TOPICAL DAILY
Qty: 45 G | Refills: 1 | Status: SHIPPED | OUTPATIENT
Start: 2021-05-07 | End: 2021-12-09

## 2021-05-07 NOTE — PROGRESS NOTES
HPI:    Patient ID: Pablo Cuevas is a 77year old male. Patient presents with a itchy rash on chest and both feet for the past few weeks. He feels it is itchy. No pain. No draining noted. No new products noted.  Has been using hydrocortisone with lit to person, place, and time. ASSESSMENT/PLAN:   1. Onychomycosis  -After discussion with patient, advised the following:  -Went over treatment options.    -Wants to avoid oral medication  -Gave Ciclopirox for use  -Educated pt to apply nightly

## 2021-05-07 NOTE — PATIENT INSTRUCTIONS
Neck     Mometasone  Apply 2 times per day for the next 2 weeks then 1 times per day for the next 2 weeks. Feet  Mometasone  Apply 2 times per week for the next 4 weeks then 1 time per week for the next 4 weeks.      Ciclopirox  Apply once on each toe n

## 2021-05-17 NOTE — TELEPHONE ENCOUNTER
Attempted to do a PA via [email or web address], unable to submit, they asked for attached documents - PA will need to be submitted or requested via tel

## 2021-06-08 DIAGNOSIS — E78.00 HYPERCHOLESTEROLEMIA: ICD-10-CM

## 2021-06-09 RX ORDER — ATORVASTATIN CALCIUM 10 MG/1
TABLET, FILM COATED ORAL
Qty: 90 TABLET | Refills: 0 | Status: SHIPPED | OUTPATIENT
Start: 2021-06-09 | End: 2021-07-26

## 2021-06-09 NOTE — TELEPHONE ENCOUNTER
Per pt chart, Ward Weld is scheduled for 7/26/21 with Dr Mora Ellison.   Refill sent for 90 days per MD instruction

## 2021-06-09 NOTE — TELEPHONE ENCOUNTER
Patient returning our call verified  and name. Informed of Dr. Jarvis Gather instructions below.    Patient states he will run out of medication in 6 days          You have no available appointments until end of July, patient has 6 pills left  Ple

## 2021-07-26 ENCOUNTER — TELEPHONE (OUTPATIENT)
Dept: INTERNAL MEDICINE CLINIC | Facility: CLINIC | Age: 66
End: 2021-07-26

## 2021-07-26 ENCOUNTER — OFFICE VISIT (OUTPATIENT)
Dept: INTERNAL MEDICINE CLINIC | Facility: CLINIC | Age: 66
End: 2021-07-26

## 2021-07-26 VITALS
WEIGHT: 242 LBS | RESPIRATION RATE: 20 BRPM | HEART RATE: 72 BPM | SYSTOLIC BLOOD PRESSURE: 136 MMHG | DIASTOLIC BLOOD PRESSURE: 88 MMHG | HEIGHT: 72 IN | BODY MASS INDEX: 32.78 KG/M2

## 2021-07-26 DIAGNOSIS — M17.12 OSTEOARTHRITIS OF LEFT KNEE, UNSPECIFIED OSTEOARTHRITIS TYPE: ICD-10-CM

## 2021-07-26 DIAGNOSIS — E78.00 HYPERCHOLESTEROLEMIA: ICD-10-CM

## 2021-07-26 DIAGNOSIS — Z00.00 ROUTINE HEALTH MAINTENANCE: ICD-10-CM

## 2021-07-26 DIAGNOSIS — E55.9 VITAMIN D DEFICIENCY: ICD-10-CM

## 2021-07-26 DIAGNOSIS — R53.83 FATIGUE, UNSPECIFIED TYPE: Primary | ICD-10-CM

## 2021-07-26 DIAGNOSIS — E55.9 VITAMIN D DEFICIENCY: Primary | ICD-10-CM

## 2021-07-26 PROCEDURE — 3075F SYST BP GE 130 - 139MM HG: CPT | Performed by: NURSE PRACTITIONER

## 2021-07-26 PROCEDURE — 3008F BODY MASS INDEX DOCD: CPT | Performed by: NURSE PRACTITIONER

## 2021-07-26 PROCEDURE — 99214 OFFICE O/P EST MOD 30 MIN: CPT | Performed by: NURSE PRACTITIONER

## 2021-07-26 PROCEDURE — 3079F DIAST BP 80-89 MM HG: CPT | Performed by: NURSE PRACTITIONER

## 2021-07-26 RX ORDER — ATORVASTATIN CALCIUM 10 MG/1
10 TABLET, FILM COATED ORAL NIGHTLY
Qty: 90 TABLET | Refills: 1 | Status: SHIPPED | OUTPATIENT
Start: 2021-07-26 | End: 2021-12-09

## 2021-07-26 NOTE — TELEPHONE ENCOUNTER
Pt came in and was seen by Deya Desai advised pt to make appt in 2 mos first available IS AFTER September with DR Rajinder Deluca

## 2021-07-27 PROBLEM — R53.83 FATIGUE: Status: ACTIVE | Noted: 2021-07-27

## 2021-07-27 NOTE — ASSESSMENT & PLAN NOTE
Reviewed the patient's lastt labs. His cholesterol is well controlled. Continue atorvastatin at current dose.

## 2021-07-27 NOTE — ASSESSMENT & PLAN NOTE
A/P 77 year male here for refills on his cholesterol medications. He also has noticed increased fatigue with exertion. He denies any chest pain or SOB.     Plan  1) Follow up on thiago physical  2) Ct Calcium score test   3) Discussed lifestyle modification

## 2021-07-27 NOTE — TELEPHONE ENCOUNTER
Spoke with patient ( verified)--scheduled patient for physical with Dr. Laura Holcomb on 2021 d/t work schedule.     Patient asking Dr. Laura Holcomb if she wants him to complete any labs prior to appt    Future Appointments   Date Time Provider Department [place]

## 2021-07-27 NOTE — ASSESSMENT & PLAN NOTE
Patient has a hx of vitamin D deficiency. We did not discuss this at his exam.  Will order Vitamin D level - message left on his cell phone. Plan  Vitamin D level.

## 2021-07-27 NOTE — PROGRESS NOTES
HPI:    Patient ID: Anna Mabry is a 77year old male. Hypercholesterolemia   Osteoarthritis   Other (Vitamin D Deficiency)    HPI Hyperlipidemia, Osteoarthritis, Fatigue and Vitamin D Deficiency  HPI Refill on his cholesterol medications.   65 year Respiratory: Negative for cough, chest tightness and shortness of breath. Cardiovascular: Negative for chest pain, palpitations and leg swelling. Gastrointestinal: Negative for abdominal pain, constipation, diarrhea, nausea and vomiting.    Endocrine is no right CVA tenderness, left CVA tenderness or guarding. Musculoskeletal:         General: No tenderness. Cervical back: Normal range of motion and neck supple. No tenderness. Right lower leg: No edema. Left lower leg: No edema.    Lymp physical  2) Ct Calcium score test   3) Discussed lifestyle modifications including reductions in dietary total and saturated fat, weight loss, aerobic exercise, and eating a diet rich in fruits and vegetables. No follow-ups on file.

## 2021-07-28 NOTE — TELEPHONE ENCOUNTER
Called patient and left a voicemail and  informed of MD comments  Informed that if he has any further questions to contact the office

## 2021-07-28 NOTE — TELEPHONE ENCOUNTER
Orders written. He should do the tests a few days before his next appointment with me. Fast for 12 hours. Water is okay.

## 2021-08-16 ENCOUNTER — HOSPITAL ENCOUNTER (OUTPATIENT)
Dept: CT IMAGING | Facility: HOSPITAL | Age: 66
Discharge: HOME | End: 2021-08-16
Attending: NURSE PRACTITIONER

## 2021-08-16 ENCOUNTER — TELEPHONE (OUTPATIENT)
Dept: INTERNAL MEDICINE CLINIC | Facility: CLINIC | Age: 66
End: 2021-08-16

## 2021-08-16 DIAGNOSIS — E78.00 HYPERCHOLESTEROLEMIA: ICD-10-CM

## 2021-08-16 DIAGNOSIS — R91.1 LUNG NODULE: Primary | ICD-10-CM

## 2021-08-16 NOTE — TELEPHONE ENCOUNTER
Patient called and message left on voicemail. Would like to get an update on how he is feeling. May want to trial antibiotics.     KIMBERLY Crabtree  Working with Royce Hurtado

## 2021-08-16 NOTE — TELEPHONE ENCOUNTER
Pt called back and is feeling ok. Pt asking for a call back to discuss. Please call him on his cell.

## 2021-08-17 ENCOUNTER — TELEPHONE (OUTPATIENT)
Dept: INTERNAL MEDICINE CLINIC | Facility: CLINIC | Age: 66
End: 2021-08-17

## 2021-08-17 RX ORDER — DOXYCYCLINE 50 MG/1
100 CAPSULE ORAL 2 TIMES DAILY
Qty: 28 CAPSULE | Refills: 0 | Status: SHIPPED | OUTPATIENT
Start: 2021-08-17 | End: 2021-12-09 | Stop reason: ALTCHOICE

## 2021-08-17 NOTE — TELEPHONE ENCOUNTER
Patient called.  No answer and mailbox full    Jil Schwab, ANP  Working with [facility]

## 2021-08-17 NOTE — TELEPHONE ENCOUNTER
Spoke with patient regarding his CT Calcium score showing a nodule possible a pneumonia. Plan  Doxycyline 100mg po BID x 7 days. Rec- CT of chest from Calcium Score Test.    Will jemal in the EMR.     KIMBERLY Mantilla  Working with Edie Willoughby

## 2021-09-07 ENCOUNTER — HOSPITAL ENCOUNTER (OUTPATIENT)
Dept: CT IMAGING | Facility: HOSPITAL | Age: 66
Discharge: HOME | End: 2021-09-07
Attending: NURSE PRACTITIONER
Payer: COMMERCIAL

## 2021-09-07 DIAGNOSIS — R91.1 LUNG NODULE: ICD-10-CM

## 2021-09-07 LAB — CREAT BLD-MCNC: 1 MG/DL

## 2021-09-07 PROCEDURE — 82565 ASSAY OF CREATININE: CPT

## 2021-09-07 PROCEDURE — 71260 CT THORAX DX C+: CPT | Performed by: NURSE PRACTITIONER

## 2021-12-09 ENCOUNTER — LAB ENCOUNTER (OUTPATIENT)
Dept: LAB | Facility: HOSPITAL | Age: 66
End: 2021-12-09
Attending: INTERNAL MEDICINE
Payer: COMMERCIAL

## 2021-12-09 ENCOUNTER — OFFICE VISIT (OUTPATIENT)
Dept: INTERNAL MEDICINE CLINIC | Facility: CLINIC | Age: 66
End: 2021-12-09
Payer: COMMERCIAL

## 2021-12-09 VITALS
DIASTOLIC BLOOD PRESSURE: 85 MMHG | BODY MASS INDEX: 32.22 KG/M2 | HEART RATE: 73 BPM | SYSTOLIC BLOOD PRESSURE: 133 MMHG | TEMPERATURE: 98 F | HEIGHT: 72 IN | WEIGHT: 237.88 LBS

## 2021-12-09 DIAGNOSIS — M25.562 CHRONIC PAIN OF LEFT KNEE: ICD-10-CM

## 2021-12-09 DIAGNOSIS — Z00.00 ROUTINE HEALTH MAINTENANCE: ICD-10-CM

## 2021-12-09 DIAGNOSIS — L30.9 CHRONIC DERMATITIS: ICD-10-CM

## 2021-12-09 DIAGNOSIS — E55.9 VITAMIN D DEFICIENCY: ICD-10-CM

## 2021-12-09 DIAGNOSIS — D22.9 NEVUS: ICD-10-CM

## 2021-12-09 DIAGNOSIS — R93.7 BONE MARROW EDEMA: ICD-10-CM

## 2021-12-09 DIAGNOSIS — G89.29 CHRONIC PAIN OF LEFT KNEE: ICD-10-CM

## 2021-12-09 DIAGNOSIS — E78.00 HYPERCHOLESTEROLEMIA: ICD-10-CM

## 2021-12-09 DIAGNOSIS — Z00.00 ROUTINE HEALTH MAINTENANCE: Primary | ICD-10-CM

## 2021-12-09 PROCEDURE — 85025 COMPLETE CBC W/AUTO DIFF WBC: CPT

## 2021-12-09 PROCEDURE — 3079F DIAST BP 80-89 MM HG: CPT | Performed by: INTERNAL MEDICINE

## 2021-12-09 PROCEDURE — 84443 ASSAY THYROID STIM HORMONE: CPT

## 2021-12-09 PROCEDURE — 82306 VITAMIN D 25 HYDROXY: CPT

## 2021-12-09 PROCEDURE — 99397 PER PM REEVAL EST PAT 65+ YR: CPT | Performed by: INTERNAL MEDICINE

## 2021-12-09 PROCEDURE — 36415 COLL VENOUS BLD VENIPUNCTURE: CPT

## 2021-12-09 PROCEDURE — 99214 OFFICE O/P EST MOD 30 MIN: CPT | Performed by: INTERNAL MEDICINE

## 2021-12-09 PROCEDURE — 80061 LIPID PANEL: CPT

## 2021-12-09 PROCEDURE — 3008F BODY MASS INDEX DOCD: CPT | Performed by: INTERNAL MEDICINE

## 2021-12-09 PROCEDURE — 3075F SYST BP GE 130 - 139MM HG: CPT | Performed by: INTERNAL MEDICINE

## 2021-12-09 PROCEDURE — 82607 VITAMIN B-12: CPT

## 2021-12-09 PROCEDURE — 80053 COMPREHEN METABOLIC PANEL: CPT

## 2021-12-09 PROCEDURE — 83036 HEMOGLOBIN GLYCOSYLATED A1C: CPT

## 2021-12-09 RX ORDER — ATORVASTATIN CALCIUM 10 MG/1
10 TABLET, FILM COATED ORAL NIGHTLY
Qty: 90 TABLET | Refills: 1 | Status: SHIPPED | OUTPATIENT
Start: 2021-12-09

## 2021-12-09 RX ORDER — MOMETASONE FUROATE 1 MG/G
1 OINTMENT TOPICAL DAILY
Qty: 45 G | Refills: 1 | Status: SHIPPED | OUTPATIENT
Start: 2021-12-09

## 2021-12-09 NOTE — PROGRESS NOTES
HPI:    Patient ID: Mandie Nieves is a 74year old male. HPI:  Pt is here for a physical.  He did a lot of walking on a trip and his left knee has been painful. He had pain 2 years ago and MRI knee was abnormal.  The pain is worse the past 2 months. Negative for pain and visual disturbance. Respiratory: Negative for cough, shortness of breath and wheezing. Cardiovascular: Negative for chest pain and palpitations.    Gastrointestinal: Negative for abdominal pain, blood in stool, diarrhea and nausea Normal anal tone. Musculoskeletal:         General: Normal range of motion. Cervical back: Normal range of motion and neck supple. Lymphadenopathy:      Cervical: No cervical adenopathy. Skin:     General: Skin is warm and dry.           Neurolog

## 2021-12-09 NOTE — ASSESSMENT & PLAN NOTE
Unremarkable exam.  Pt's colon cancer screening is up to date. Immunizations were reviewed. Counseled pt regarding diet and exercise. Patient will do labs today.

## 2021-12-15 RX ORDER — CHOLECALCIFEROL (VITAMIN D3) 1250 MCG
50000 CAPSULE ORAL WEEKLY
Qty: 12 CAPSULE | Refills: 1 | Status: SHIPPED | OUTPATIENT
Start: 2021-12-15 | End: 2022-03-03

## 2022-01-05 ENCOUNTER — OFFICE VISIT (OUTPATIENT)
Dept: DERMATOLOGY CLINIC | Facility: CLINIC | Age: 67
End: 2022-01-05
Payer: COMMERCIAL

## 2022-01-05 DIAGNOSIS — D48.5 NEOPLASM OF UNCERTAIN BEHAVIOR OF SKIN: Primary | ICD-10-CM

## 2022-01-05 PROCEDURE — 99213 OFFICE O/P EST LOW 20 MIN: CPT | Performed by: PHYSICIAN ASSISTANT

## 2022-01-05 PROCEDURE — 11102 TANGNTL BX SKIN SINGLE LES: CPT | Performed by: PHYSICIAN ASSISTANT

## 2022-01-05 PROCEDURE — 88305 TISSUE EXAM BY PATHOLOGIST: CPT | Performed by: PHYSICIAN ASSISTANT

## 2022-01-05 NOTE — PROGRESS NOTES
HPI:    Patient ID: Lo Randolph is a 74year old male. Patient presents with a spot on left upper arm that has gotten larger. No pain or bleeding noted. Is darker than his other moles on his body. No draining. No tenderness noted.   Hx of allergies concerns.   -Pt was agreeable to plan and will comply with discussion above. - SURGICAL PATHOLOGY TISSUE;  Future      Orders Placed This Encounter      Specimen to Pathology Tissue      Meds This Visit:  Requested Prescriptions      No prescriptions re

## 2022-01-05 NOTE — PROCEDURES
Procedure: Shave biopsy     With the patient is a supine position, the skin was scrubbed with alcohol. Anesthesia was obtained by injecting 2 mL of 1% Xylocaine with Epinephrine.  The skin surrounding the lesion on left upper arm by elbow was placed under

## 2022-01-06 ENCOUNTER — HOSPITAL ENCOUNTER (OUTPATIENT)
Dept: MRI IMAGING | Facility: HOSPITAL | Age: 67
Discharge: HOME | End: 2022-01-06
Attending: INTERNAL MEDICINE
Payer: COMMERCIAL

## 2022-01-06 DIAGNOSIS — M25.562 CHRONIC PAIN OF LEFT KNEE: ICD-10-CM

## 2022-01-06 DIAGNOSIS — G89.29 CHRONIC PAIN OF LEFT KNEE: ICD-10-CM

## 2022-01-06 PROCEDURE — 73721 MRI JNT OF LWR EXTRE W/O DYE: CPT | Performed by: INTERNAL MEDICINE

## 2022-01-07 NOTE — PROGRESS NOTES
Logged in path book and [facility]. LMTCB to schedule pt for his 3 month appt with KMT.

## 2022-01-11 ENCOUNTER — TELEPHONE (OUTPATIENT)
Dept: PHYSICAL THERAPY | Facility: HOSPITAL | Age: 67
End: 2022-01-11

## 2022-01-11 ENCOUNTER — TELEPHONE (OUTPATIENT)
Dept: INTERNAL MEDICINE CLINIC | Facility: CLINIC | Age: 67
End: 2022-01-11

## 2022-01-11 ENCOUNTER — PATIENT MESSAGE (OUTPATIENT)
Dept: INTERNAL MEDICINE CLINIC | Facility: CLINIC | Age: 67
End: 2022-01-11

## 2022-01-11 DIAGNOSIS — M17.31 POST-TRAUMATIC OSTEOARTHRITIS OF RIGHT KNEE: Primary | ICD-10-CM

## 2022-01-11 NOTE — TELEPHONE ENCOUNTER
Elena Stock RN 1/11/2022 12:13 PM CST        ----- Message -----  From: Jaime Matthew  Sent: 1/11/2022 11:50 AM CST  To: Em Rn Triage  Subject: other     Thank you. Should I start physical therapy ? Or wait to see dr. Colin Carcamo ?  And then go into PT

## 2022-01-12 ENCOUNTER — OFFICE VISIT (OUTPATIENT)
Dept: SURGERY | Facility: CLINIC | Age: 67
End: 2022-01-12
Payer: COMMERCIAL

## 2022-01-12 VITALS
OXYGEN SATURATION: 96 % | RESPIRATION RATE: 20 BRPM | HEART RATE: 86 BPM | SYSTOLIC BLOOD PRESSURE: 155 MMHG | BODY MASS INDEX: 31 KG/M2 | WEIGHT: 230.81 LBS | DIASTOLIC BLOOD PRESSURE: 68 MMHG

## 2022-01-12 DIAGNOSIS — D03.62 MELANOMA IN SITU OF LEFT UPPER EXTREMITY INCLUDING SHOULDER (HCC): Primary | ICD-10-CM

## 2022-01-12 PROCEDURE — 99245 OFF/OP CONSLTJ NEW/EST HI 55: CPT | Performed by: SURGERY

## 2022-01-12 PROCEDURE — 3077F SYST BP >= 140 MM HG: CPT | Performed by: SURGERY

## 2022-01-12 PROCEDURE — 3078F DIAST BP <80 MM HG: CPT | Performed by: SURGERY

## 2022-01-12 NOTE — H&P (VIEW-ONLY)
[facility]    Patient Name:  Rosana Doshi   YOB: 1955   Gender:  Male   Appt Date:  1/12/2022   Provider:  Reese Landry MD   Insurance:  [address] [place] [address]     PATIENT PROVIDERS  Referring 155/68 (BP Location: Left arm, Patient Position: Sitting, Cuff Size: adult)   Pulse 86   Resp 20   Wt 104.7 kg (230 lb 12.8 oz)   SpO2 96%   BMI 31.30 kg/m²      Medications Reviewed:    Current Outpatient Medications:   •  Cholecalciferol (VITAMIN D3) 1.2 History of tanning: No        Reaction to local anesthetic: No     Reviewed:  Family History   Problem Relation Age of Onset   • Stroke Mother         CVA; Cause of death   • Macular degeneration Mother    • Diabetes Father    • Prostate Cancer Father 79 Abdominal:      General: Bowel sounds are normal. There is no distension. Palpations: Abdomen is soft. Musculoskeletal:         General: Normal range of motion.    Lymphadenopathy:      Cervical:      Right cervical: No superficial, deep or pos inflammation.     The findings are consistent with melanoma in situ. The tumor appears narrowly excised in the examined planes of section. A re-excision with appropriate margins is recommended.    Clinical Information     A12.8 Neoplasm Of Uncertain Behav

## 2022-01-12 NOTE — PATIENT INSTRUCTIONS
Surgery: Wide local excision melanoma left upper arm (MAC)     Date of Surgery:    Hospital:    [address] [place] [address], [facility]   Phone: [phone]    · This is an outpatient procedure.   · Use the provided Chlorhexadine godinez

## 2022-01-12 NOTE — CONSULTS
[facility]    Patient Name:  Brandon Peralta   YOB: 1955   Gender:  Male   Appt Date:  1/12/2022   Provider:  Nikhil Schuler MD   Insurance:  [facility]     PATIENT PROVIDERS  Referring 155/68 (BP Location: Left arm, Patient Position: Sitting, Cuff Size: adult)   Pulse 86   Resp 20   Wt 104.7 kg (230 lb 12.8 oz)   SpO2 96%   BMI 31.30 kg/m²      Medications Reviewed:    Current Outpatient Medications:   •  Cholecalciferol (VITAMIN D3) 1.2 History of tanning: No        Reaction to local anesthetic: No     Reviewed:  Family History   Problem Relation Age of Onset   • Stroke Mother         CVA; Cause of death   • Macular degeneration Mother    • Diabetes Father    • Prostate Cancer Father 79 Abdominal:      General: Bowel sounds are normal. There is no distension. Palpations: Abdomen is soft. Musculoskeletal:         General: Normal range of motion.    Lymphadenopathy:      Cervical:      Right cervical: No superficial, deep or pos inflammation.     The findings are consistent with melanoma in situ. The tumor appears narrowly excised in the examined planes of section. A re-excision with appropriate margins is recommended.    Clinical Information     H26.3 Neoplasm Of Uncertain Behav

## 2022-01-13 ENCOUNTER — TELEPHONE (OUTPATIENT)
Dept: SURGERY | Facility: CLINIC | Age: 67
End: 2022-01-13

## 2022-01-13 DIAGNOSIS — D03.62 MELANOMA IN SITU OF LEFT UPPER EXTREMITY INCLUDING SHOULDER (HCC): Primary | ICD-10-CM

## 2022-01-17 ENCOUNTER — LAB ENCOUNTER (OUTPATIENT)
Dept: LAB | Facility: HOSPITAL | Age: 67
End: 2022-01-17
Attending: SURGERY
Payer: COMMERCIAL

## 2022-01-17 DIAGNOSIS — Z01.818 PREOP TESTING: ICD-10-CM

## 2022-01-19 LAB — SARS-COV-2 RNA RESP QL NAA+PROBE: NOT DETECTED

## 2022-01-20 ENCOUNTER — ANESTHESIA EVENT (OUTPATIENT)
Dept: SURGERY | Facility: HOSPITAL | Age: 67
End: 2022-01-20
Payer: COMMERCIAL

## 2022-01-20 ENCOUNTER — APPOINTMENT (OUTPATIENT)
Dept: PHYSICAL THERAPY | Facility: HOSPITAL | Age: 67
End: 2022-01-20
Attending: INTERNAL MEDICINE
Payer: COMMERCIAL

## 2022-01-20 ENCOUNTER — ANESTHESIA (OUTPATIENT)
Dept: SURGERY | Facility: HOSPITAL | Age: 67
End: 2022-01-20
Payer: COMMERCIAL

## 2022-01-20 ENCOUNTER — HOSPITAL ENCOUNTER (OUTPATIENT)
Facility: HOSPITAL | Age: 67
Setting detail: HOSPITAL OUTPATIENT SURGERY
Discharge: HOME | End: 2022-01-20
Attending: SURGERY | Admitting: SURGERY
Payer: COMMERCIAL

## 2022-01-20 VITALS
HEART RATE: 58 BPM | SYSTOLIC BLOOD PRESSURE: 119 MMHG | TEMPERATURE: 97 F | OXYGEN SATURATION: 99 % | WEIGHT: 230 LBS | BODY MASS INDEX: 31.15 KG/M2 | DIASTOLIC BLOOD PRESSURE: 67 MMHG | HEIGHT: 72 IN | RESPIRATION RATE: 16 BRPM

## 2022-01-20 DIAGNOSIS — Z01.818 PREOP TESTING: Primary | ICD-10-CM

## 2022-01-20 DIAGNOSIS — D03.62 MELANOMA IN SITU OF LEFT UPPER EXTREMITY INCLUDING SHOULDER (HCC): ICD-10-CM

## 2022-01-20 PROCEDURE — 0HBEXZZ EXCISION OF LEFT LOWER ARM SKIN, EXTERNAL APPROACH: ICD-10-PCS | Performed by: SURGERY

## 2022-01-20 PROCEDURE — 88305 TISSUE EXAM BY PATHOLOGIST: CPT | Performed by: SURGERY

## 2022-01-20 PROCEDURE — 88307 TISSUE EXAM BY PATHOLOGIST: CPT | Performed by: SURGERY

## 2022-01-20 RX ORDER — SODIUM CHLORIDE, SODIUM LACTATE, POTASSIUM CHLORIDE, CALCIUM CHLORIDE 600; 310; 30; 20 MG/100ML; MG/100ML; MG/100ML; MG/100ML
INJECTION, SOLUTION INTRAVENOUS CONTINUOUS
Status: DISCONTINUED | OUTPATIENT
Start: 2022-01-20 | End: 2022-01-20

## 2022-01-20 RX ORDER — SODIUM CHLORIDE, SODIUM LACTATE, POTASSIUM CHLORIDE, CALCIUM CHLORIDE 600; 310; 30; 20 MG/100ML; MG/100ML; MG/100ML; MG/100ML
INJECTION, SOLUTION INTRAVENOUS CONTINUOUS
OUTPATIENT
Start: 2022-01-20

## 2022-01-20 RX ORDER — HYDROMORPHONE HYDROCHLORIDE 1 MG/ML
0.2 INJECTION, SOLUTION INTRAMUSCULAR; INTRAVENOUS; SUBCUTANEOUS EVERY 5 MIN PRN
OUTPATIENT
Start: 2022-01-20

## 2022-01-20 RX ORDER — NALOXONE HYDROCHLORIDE 0.4 MG/ML
80 INJECTION, SOLUTION INTRAMUSCULAR; INTRAVENOUS; SUBCUTANEOUS AS NEEDED
OUTPATIENT
Start: 2022-01-20 | End: 2022-01-20

## 2022-01-20 RX ORDER — HYDROCODONE BITARTRATE AND ACETAMINOPHEN 5; 325 MG/1; MG/1
1 TABLET ORAL AS NEEDED
OUTPATIENT
Start: 2022-01-20

## 2022-01-20 RX ORDER — HYDROMORPHONE HYDROCHLORIDE 1 MG/ML
0.6 INJECTION, SOLUTION INTRAMUSCULAR; INTRAVENOUS; SUBCUTANEOUS EVERY 5 MIN PRN
OUTPATIENT
Start: 2022-01-20

## 2022-01-20 RX ORDER — METOCLOPRAMIDE 10 MG/1
10 TABLET ORAL ONCE
Status: COMPLETED | OUTPATIENT
Start: 2022-01-20 | End: 2022-01-20

## 2022-01-20 RX ORDER — ACETAMINOPHEN 500 MG
1000 TABLET ORAL ONCE
Status: COMPLETED | OUTPATIENT
Start: 2022-01-20 | End: 2022-01-20

## 2022-01-20 RX ORDER — PROCHLORPERAZINE EDISYLATE 5 MG/ML
5 INJECTION INTRAMUSCULAR; INTRAVENOUS ONCE AS NEEDED
OUTPATIENT
Start: 2022-01-20 | End: 2022-01-20

## 2022-01-20 RX ORDER — HYDROCODONE BITARTRATE AND ACETAMINOPHEN 5; 325 MG/1; MG/1
2 TABLET ORAL AS NEEDED
OUTPATIENT
Start: 2022-01-20

## 2022-01-20 RX ORDER — HYDROMORPHONE HYDROCHLORIDE 1 MG/ML
0.4 INJECTION, SOLUTION INTRAMUSCULAR; INTRAVENOUS; SUBCUTANEOUS EVERY 5 MIN PRN
OUTPATIENT
Start: 2022-01-20

## 2022-01-20 RX ORDER — FAMOTIDINE 20 MG/1
20 TABLET ORAL ONCE
Status: COMPLETED | OUTPATIENT
Start: 2022-01-20 | End: 2022-01-20

## 2022-01-20 RX ORDER — MORPHINE SULFATE 10 MG/ML
6 INJECTION, SOLUTION INTRAMUSCULAR; INTRAVENOUS EVERY 10 MIN PRN
OUTPATIENT
Start: 2022-01-20

## 2022-01-20 RX ORDER — TRAMADOL HYDROCHLORIDE 50 MG/1
50 TABLET ORAL EVERY 6 HOURS PRN
Qty: 8 TABLET | Refills: 0 | Status: SHIPPED | OUTPATIENT
Start: 2022-01-20

## 2022-01-20 RX ORDER — ONDANSETRON 2 MG/ML
4 INJECTION INTRAMUSCULAR; INTRAVENOUS ONCE AS NEEDED
OUTPATIENT
Start: 2022-01-20 | End: 2022-01-20

## 2022-01-20 RX ORDER — MIDAZOLAM HYDROCHLORIDE 1 MG/ML
INJECTION INTRAMUSCULAR; INTRAVENOUS AS NEEDED
Status: DISCONTINUED | OUTPATIENT
Start: 2022-01-20 | End: 2022-01-20 | Stop reason: SURG

## 2022-01-20 RX ORDER — LIDOCAINE HYDROCHLORIDE 10 MG/ML
INJECTION, SOLUTION EPIDURAL; INFILTRATION; INTRACAUDAL; PERINEURAL AS NEEDED
Status: DISCONTINUED | OUTPATIENT
Start: 2022-01-20 | End: 2022-01-20 | Stop reason: SURG

## 2022-01-20 RX ORDER — MORPHINE SULFATE 2 MG/ML
2 INJECTION, SOLUTION INTRAMUSCULAR; INTRAVENOUS EVERY 10 MIN PRN
OUTPATIENT
Start: 2022-01-20

## 2022-01-20 RX ORDER — HALOPERIDOL 5 MG/ML
0.25 INJECTION INTRAMUSCULAR ONCE AS NEEDED
OUTPATIENT
Start: 2022-01-20 | End: 2022-01-20

## 2022-01-20 RX ORDER — MORPHINE SULFATE 4 MG/ML
4 INJECTION, SOLUTION INTRAMUSCULAR; INTRAVENOUS EVERY 10 MIN PRN
OUTPATIENT
Start: 2022-01-20

## 2022-01-20 RX ADMIN — SODIUM CHLORIDE, SODIUM LACTATE, POTASSIUM CHLORIDE, CALCIUM CHLORIDE: 600; 310; 30; 20 INJECTION, SOLUTION INTRAVENOUS at 07:44:00

## 2022-01-20 RX ADMIN — LIDOCAINE HYDROCHLORIDE 50 MG: 10 INJECTION, SOLUTION EPIDURAL; INFILTRATION; INTRACAUDAL; PERINEURAL at 07:37:00

## 2022-01-20 RX ADMIN — SODIUM CHLORIDE, SODIUM LACTATE, POTASSIUM CHLORIDE, CALCIUM CHLORIDE: 600; 310; 30; 20 INJECTION, SOLUTION INTRAVENOUS at 08:07:00

## 2022-01-20 RX ADMIN — SODIUM CHLORIDE, SODIUM LACTATE, POTASSIUM CHLORIDE, CALCIUM CHLORIDE: 600; 310; 30; 20 INJECTION, SOLUTION INTRAVENOUS at 08:28:00

## 2022-01-20 RX ADMIN — SODIUM CHLORIDE, SODIUM LACTATE, POTASSIUM CHLORIDE, CALCIUM CHLORIDE: 600; 310; 30; 20 INJECTION, SOLUTION INTRAVENOUS at 07:33:00

## 2022-01-20 RX ADMIN — MIDAZOLAM HYDROCHLORIDE 2 MG: 1 INJECTION INTRAMUSCULAR; INTRAVENOUS at 07:34:00

## 2022-01-20 NOTE — ANESTHESIA PREPROCEDURE EVALUATION
Anesthesia PreOp Note    HPI:     Lamont Real is a 74year old male who presents for preoperative consultation requested by: Jarett Lancaster MD    Date of Surgery: 1/20/2022    Procedure(s):   Wide local excision left upper arm melanoma  Indication: Me Other and unspecified hyperlipidemia    • Plantar fasciitis 2009, 2010    - Orthotics   • Thoracic or lumbosacral neuritis or radiculitis, unspecified    • Tinea 2009, 2010   • Visual impairment     readers       Past Surgical History:   Procedure Logan Cola level: Not on file    Occupational History      Not on file    Tobacco Use      Smoking status: Never Smoker      Smokeless tobacco: Never Used    Vaping Use      Vaping Use: Never used    Substance and Sexual Activity      Alcohol use:  Yes        Alcohol/ mass index is 31.19 kg/m². height is 1.829 m (6') and weight is 104.3 kg (230 lb). His oral temperature is 97.8 °F (36.6 °C). His blood pressure is 163/73 (abnormal) and his pulse is 77. His respiration is 18 and oxygen saturation is 98%.     01/18/22  12

## 2022-01-20 NOTE — INTERVAL H&P NOTE
Patient seen and examined. No changes to the attached history and physical are noted. 74year old male presenting today for planned wide local excision melanoma in situ left upper arm.     Jeremy Severino PA-C    Department of Surgical Oncology  [facility]

## 2022-01-20 NOTE — ANESTHESIA POSTPROCEDURE EVALUATION
Patient: Chinmay Casas    Procedure Summary     Date: 01/20/22 Room / Location: [address] [place] [address] MAIN OR 11 / [address] [place] [address] MAIN OR    Anesthesia Start: 2506 Anesthesia Stop: 3864    Procedure:  Wide local excision left upper arm melanoma (Left Shoulder) Diagnosis:       Melan

## 2022-01-20 NOTE — BRIEF OP NOTE
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

## 2022-01-20 NOTE — OPERATIVE REPORT
Date of operation 1/20/2022    Preoperative diagnosis: #1 melanoma in situ, left forearm    Operation performed: #1 wide local excision, melanoma in situ left forearm, defect measures 2.5 x 8 cm greatest dimensions.   2.  Complex closure, 2.5 x 8 cm defect state.  I was present for the entire case. Gris Epstein MD  Complex General Surgical Oncology  [facility]  Pager [phone]  [email or web address]

## 2022-01-21 ENCOUNTER — TELEPHONE (OUTPATIENT)
Dept: SURGERY | Facility: CLINIC | Age: 67
End: 2022-01-21

## 2022-01-21 NOTE — TELEPHONE ENCOUNTER
Called to check on patient after procedure yesterday. Doing well, pain controlled. Denies problems with incisions. Post op appt confirmed. He will call with concerns or questions.     Madison Glynn PA-C    Department of Surgical Oncology  [facility]

## 2022-01-21 NOTE — TELEPHONE ENCOUNTER
LVM to check on patient after procedure yesterday.  Callback number given    Madison Glynn PA-C    Department of Surgical Oncology  [facility]  [phone]  Pager: [phone]

## 2022-01-23 ENCOUNTER — TELEPHONE (OUTPATIENT)
Dept: SURGERY | Facility: CLINIC | Age: 67
End: 2022-01-23

## 2022-01-23 NOTE — TELEPHONE ENCOUNTER
Called to update pt with path  LVM    Baldomero Guerra MD  Complex General Surgical Oncology  [facility]  Pager [phone]  [email or web address]

## 2022-01-25 ENCOUNTER — OFFICE VISIT (OUTPATIENT)
Dept: ORTHOPEDICS CLINIC | Facility: CLINIC | Age: 67
End: 2022-01-25
Payer: COMMERCIAL

## 2022-01-25 DIAGNOSIS — M17.12 OSTEOARTHRITIS OF LEFT KNEE, UNSPECIFIED OSTEOARTHRITIS TYPE: Primary | ICD-10-CM

## 2022-01-25 PROCEDURE — 99213 OFFICE O/P EST LOW 20 MIN: CPT | Performed by: ORTHOPAEDIC SURGERY

## 2022-01-25 NOTE — PROGRESS NOTES
NURSING INTAKE COMMENTS: Patient presents with:  Knee Pain: Left f/u - was seen back in 2/2020 - pain got worse with time, has new MRI in the system - rates pain as 1-10/10 on and off - the knee gave out on him pretty often       HPI: This 74year old male by mouth nightly. 90 tablet 1   • mometasone 0.1 % External Ointment Apply 1 Application topically daily.  (Patient taking differently: Apply 1 Application topically daily as needed.) 45 g 1   • Ciclopirox 8 % External Solution Apply 1 Application topically stable.     Imaging: MRI KNEE, LEFT (VLF=23724)    Result Date: 1/6/2022  PROCEDURE: MRI KNEE, LEFT (CPT=73721)  COMPARISON: MR JOSE A, MRI KNEE, LEFT (IBO=18923), 2/04/2020, 7:57 AM.  [facility], XR KNEE ROUTINE (3 VIEWS), LE medial femoral condyle which is significantly improved but not entirely resolved. Since the prior exam, there has been development of an insufficiency fracture involving the medial tibial plateau.   There is a fracture line seen best on series 7, image 15 Left knee pain consistent with primary osteoarthritis associated with medial tibial plateau insufficiency fracture Medial meniscus    Plan: At this point I recommended left total knee arthroplasty. He has failed nonoperative treatment.   We discussed sarah

## 2022-01-27 ENCOUNTER — APPOINTMENT (OUTPATIENT)
Dept: PHYSICAL THERAPY | Facility: HOSPITAL | Age: 67
End: 2022-01-27
Attending: INTERNAL MEDICINE
Payer: COMMERCIAL

## 2022-02-01 ENCOUNTER — APPOINTMENT (OUTPATIENT)
Dept: PHYSICAL THERAPY | Facility: HOSPITAL | Age: 67
End: 2022-02-01
Attending: INTERNAL MEDICINE
Payer: COMMERCIAL

## 2022-02-02 ENCOUNTER — OFFICE VISIT (OUTPATIENT)
Dept: SURGERY | Facility: CLINIC | Age: 67
End: 2022-02-02
Payer: COMMERCIAL

## 2022-02-02 VITALS — DIASTOLIC BLOOD PRESSURE: 93 MMHG | OXYGEN SATURATION: 98 % | SYSTOLIC BLOOD PRESSURE: 166 MMHG | HEART RATE: 67 BPM

## 2022-02-02 DIAGNOSIS — D03.62 MELANOMA IN SITU OF LEFT UPPER EXTREMITY INCLUDING SHOULDER (HCC): Primary | ICD-10-CM

## 2022-02-02 PROCEDURE — 3080F DIAST BP >= 90 MM HG: CPT | Performed by: SURGERY

## 2022-02-02 PROCEDURE — 99024 POSTOP FOLLOW-UP VISIT: CPT | Performed by: SURGERY

## 2022-02-02 PROCEDURE — 3077F SYST BP >= 140 MM HG: CPT | Performed by: SURGERY

## 2022-02-04 ENCOUNTER — APPOINTMENT (OUTPATIENT)
Dept: PHYSICAL THERAPY | Facility: HOSPITAL | Age: 67
End: 2022-02-04
Attending: INTERNAL MEDICINE
Payer: COMMERCIAL

## 2022-02-09 ENCOUNTER — APPOINTMENT (OUTPATIENT)
Dept: PHYSICAL THERAPY | Facility: HOSPITAL | Age: 67
End: 2022-02-09
Attending: INTERNAL MEDICINE
Payer: COMMERCIAL

## 2022-02-10 ENCOUNTER — OFFICE VISIT (OUTPATIENT)
Dept: INTERNAL MEDICINE CLINIC | Facility: CLINIC | Age: 67
End: 2022-02-10
Payer: COMMERCIAL

## 2022-02-10 VITALS
BODY MASS INDEX: 31.24 KG/M2 | HEIGHT: 72 IN | DIASTOLIC BLOOD PRESSURE: 76 MMHG | HEART RATE: 85 BPM | TEMPERATURE: 97 F | WEIGHT: 230.63 LBS | SYSTOLIC BLOOD PRESSURE: 137 MMHG

## 2022-02-10 DIAGNOSIS — Z01.818 PRE-OP TESTING: ICD-10-CM

## 2022-02-10 DIAGNOSIS — M84.40XA INSUFFICIENCY FRACTURE: Primary | ICD-10-CM

## 2022-02-10 DIAGNOSIS — D03.62 MELANOMA IN SITU OF LEFT UPPER EXTREMITY INCLUDING SHOULDER (HCC): ICD-10-CM

## 2022-02-10 DIAGNOSIS — L30.9 CHRONIC DERMATITIS: ICD-10-CM

## 2022-02-10 DIAGNOSIS — S83.242D TEAR OF MEDIAL MENISCUS OF LEFT KNEE, UNSPECIFIED TEAR TYPE, UNSPECIFIED WHETHER OLD OR CURRENT TEAR, SUBSEQUENT ENCOUNTER: ICD-10-CM

## 2022-02-10 PROBLEM — S83.242A TEAR OF MEDIAL MENISCUS OF LEFT KNEE: Status: ACTIVE | Noted: 2022-02-10

## 2022-02-10 PROCEDURE — 3075F SYST BP GE 130 - 139MM HG: CPT | Performed by: INTERNAL MEDICINE

## 2022-02-10 PROCEDURE — 3008F BODY MASS INDEX DOCD: CPT | Performed by: INTERNAL MEDICINE

## 2022-02-10 PROCEDURE — 99214 OFFICE O/P EST MOD 30 MIN: CPT | Performed by: INTERNAL MEDICINE

## 2022-02-10 PROCEDURE — 3078F DIAST BP <80 MM HG: CPT | Performed by: INTERNAL MEDICINE

## 2022-02-10 NOTE — ASSESSMENT & PLAN NOTE
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 It improves with mometasone cream, but then recurs.   I advised him to continue the mometasone cream.

## 2022-02-10 NOTE — ASSESSMENT & PLAN NOTE
I reviewed the patient's MRI of his knee, and the notes from the orthopedist.  Patient will be having surgery, but he has not scheduled this yet. Advised patient to schedule a preop exam within 30 days of surgery.

## 2022-02-10 NOTE — ASSESSMENT & PLAN NOTE
Patient's melanoma was recently excised. His wound is healing well. He will follow-up with dermatology.

## 2022-02-10 NOTE — ASSESSMENT & PLAN NOTE
Patient has insufficiency fractures of his tibial plateau and medial femoral condyle. He will schedule surgery.

## 2022-02-11 ENCOUNTER — APPOINTMENT (OUTPATIENT)
Dept: PHYSICAL THERAPY | Facility: HOSPITAL | Age: 67
End: 2022-02-11
Attending: INTERNAL MEDICINE
Payer: COMMERCIAL

## 2022-02-15 ENCOUNTER — APPOINTMENT (OUTPATIENT)
Dept: PHYSICAL THERAPY | Facility: HOSPITAL | Age: 67
End: 2022-02-15
Attending: INTERNAL MEDICINE
Payer: COMMERCIAL

## 2022-02-17 ENCOUNTER — TELEPHONE (OUTPATIENT)
Dept: ORTHOPEDICS CLINIC | Facility: CLINIC | Age: 67
End: 2022-02-17

## 2022-02-17 ENCOUNTER — APPOINTMENT (OUTPATIENT)
Dept: PHYSICAL THERAPY | Facility: HOSPITAL | Age: 67
End: 2022-02-17
Attending: INTERNAL MEDICINE
Payer: COMMERCIAL

## 2022-02-17 NOTE — TELEPHONE ENCOUNTER
[facility] Patient needs to schedule MRI before we can look for surgery dates.  [phone]

## 2022-03-01 NOTE — TELEPHONE ENCOUNTER
[facility], patient's MRI is authorized. Needs a special MRI to build the new knee. Patient needs to schedule imaging, from there I can look at surgery dates 6 weeks out. Patient is to call [phone] with any questions.

## 2022-03-09 NOTE — TELEPHONE ENCOUNTER
Called patient back, LMTCB. We have surgery dates of 5/12 or 5/19 available, based off of MRI being done on 4/5/22. Patient can call me back or send me a message via Schedulize.

## 2022-04-05 ENCOUNTER — HOSPITAL ENCOUNTER (OUTPATIENT)
Dept: MRI IMAGING | Facility: HOSPITAL | Age: 67
Discharge: HOME | End: 2022-04-05
Attending: ORTHOPAEDIC SURGERY
Payer: COMMERCIAL

## 2022-04-05 DIAGNOSIS — M17.12 OSTEOARTHRITIS OF LEFT KNEE, UNSPECIFIED OSTEOARTHRITIS TYPE: ICD-10-CM

## 2022-04-05 PROCEDURE — 73721 MRI JNT OF LWR EXTRE W/O DYE: CPT | Performed by: ORTHOPAEDIC SURGERY

## 2022-04-07 ENCOUNTER — TELEPHONE (OUTPATIENT)
Dept: ORTHOPEDICS CLINIC | Facility: CLINIC | Age: 67
End: 2022-04-07

## 2022-04-07 NOTE — TELEPHONE ENCOUNTER
Sent patient a JEDI MINDt message, as I have attempted to contact patient several times. Patient will need to make an appointment with another Ortho Doctor. As Dr Simón Serrano will no longer be with us. If patient calls- Please schedule him an appointment with one of the other Ortho Doctors.

## 2022-04-08 ENCOUNTER — PATIENT MESSAGE (OUTPATIENT)
Dept: ORTHOPEDICS CLINIC | Facility: CLINIC | Age: 67
End: 2022-04-08

## 2022-04-08 ENCOUNTER — OFFICE VISIT (OUTPATIENT)
Dept: DERMATOLOGY CLINIC | Facility: CLINIC | Age: 67
End: 2022-04-08
Payer: COMMERCIAL

## 2022-04-08 DIAGNOSIS — D23.60 BENIGN NEOPLASM OF SKIN OF UPPER LIMB, INCLUDING SHOULDER, UNSPECIFIED LATERALITY: ICD-10-CM

## 2022-04-08 DIAGNOSIS — D48.5 NEOPLASM OF UNCERTAIN BEHAVIOR OF SKIN: Primary | ICD-10-CM

## 2022-04-08 DIAGNOSIS — D23.4 BENIGN NEOPLASM OF SCALP AND SKIN OF NECK: ICD-10-CM

## 2022-04-08 DIAGNOSIS — D23.5 BENIGN NEOPLASM OF SKIN OF TRUNK, EXCEPT SCROTUM: ICD-10-CM

## 2022-04-08 DIAGNOSIS — Z85.820 PERSONAL HISTORY OF MALIGNANT MELANOMA OF SKIN: ICD-10-CM

## 2022-04-08 DIAGNOSIS — D22.9 ATYPICAL NEVI: ICD-10-CM

## 2022-04-08 DIAGNOSIS — L81.4 LENTIGO: ICD-10-CM

## 2022-04-08 DIAGNOSIS — D23.30 BENIGN NEOPLASM OF SKIN OF FACE: ICD-10-CM

## 2022-04-08 DIAGNOSIS — D23.70 BENIGN NEOPLASM OF SKIN OF LOWER LIMB, INCLUDING HIP, UNSPECIFIED LATERALITY: ICD-10-CM

## 2022-04-08 DIAGNOSIS — B35.1 ONYCHOMYCOSIS: ICD-10-CM

## 2022-04-08 DIAGNOSIS — D22.9 MULTIPLE NEVI: ICD-10-CM

## 2022-04-08 PROCEDURE — 88305 TISSUE EXAM BY PATHOLOGIST: CPT | Performed by: DERMATOLOGY

## 2022-04-08 PROCEDURE — 11102 TANGNTL BX SKIN SINGLE LES: CPT | Performed by: DERMATOLOGY

## 2022-04-08 PROCEDURE — 99214 OFFICE O/P EST MOD 30 MIN: CPT | Performed by: DERMATOLOGY

## 2022-04-08 RX ORDER — NAFTIFINE HYDROCHLORIDE 20 MG/G
CREAM TOPICAL
Qty: 60 G | Refills: 2 | Status: SHIPPED | OUTPATIENT
Start: 2022-04-08

## 2022-04-08 RX ORDER — NAFTIFINE HYDROCHLORIDE 20 MG/G
CREAM TOPICAL
Qty: 60 G | Status: SHIPPED | OUTPATIENT
Start: 2022-04-08 | End: 2022-04-08

## 2022-04-12 ENCOUNTER — TELEPHONE (OUTPATIENT)
Dept: DERMATOLOGY CLINIC | Facility: CLINIC | Age: 67
End: 2022-04-12

## 2022-04-12 NOTE — TELEPHONE ENCOUNTER
Patient called    Asking to speak to RN about a medication for his leg not covered by insurance. Does not know the name.      Asking to go over biopsy results seen in My Chart

## 2022-04-12 NOTE — TELEPHONE ENCOUNTER
Called patient back. Left a detailed message. Patient is to call [phone] and schedule an appointment with Dr Jada Krause. As Dr Jada Krause uses [facility], just like Dr Raphael Perry. Patient will need to make a new appointment with Dr Jada Krause to discuss surgery.

## 2022-04-14 NOTE — PROGRESS NOTES
Logged in path book and pmh. Pt informed of pathology results and KMT's recommendations for follow up. Pt verbalized understanding and scheduled for 3 month skin exam at the end of July 2022.

## 2022-04-14 NOTE — PROGRESS NOTES
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 No further surgery should be necessary. Patient to continue routine follow-up ideally every 3 months given his recent melanoma. Please log in test results. Please call patient and inform of results and recommendations.  (please add to history). Pt to  rtc approximately 3 months or prn.

## 2022-04-14 NOTE — TELEPHONE ENCOUNTER
Dr. Maida Pickett - pt informed of biopsy results and scheduled for 3 month exam in July. Pt also calling about his medication for his feet. Pt states the Naftin 2% cream for his feet is not covered and will cost him $300. GoodRx is only good for the 1% cream and is still around $130. Pt asking for an alternative if possible. Thank you.

## 2022-04-14 NOTE — TELEPHONE ENCOUNTER
There is an alternative pharmacy for Naftin-please check in triage for the coupon this may be the best option.

## 2022-04-29 ENCOUNTER — HOSPITAL ENCOUNTER (OUTPATIENT)
Dept: GENERAL RADIOLOGY | Facility: HOSPITAL | Age: 67
Discharge: HOME | End: 2022-04-29
Attending: ORTHOPAEDIC SURGERY
Payer: COMMERCIAL

## 2022-04-29 ENCOUNTER — OFFICE VISIT (OUTPATIENT)
Dept: ORTHOPEDICS CLINIC | Facility: CLINIC | Age: 67
End: 2022-04-29
Payer: COMMERCIAL

## 2022-04-29 VITALS — DIASTOLIC BLOOD PRESSURE: 83 MMHG | HEART RATE: 74 BPM | SYSTOLIC BLOOD PRESSURE: 129 MMHG

## 2022-04-29 DIAGNOSIS — R52 PAIN: Primary | ICD-10-CM

## 2022-04-29 DIAGNOSIS — R52 PAIN: ICD-10-CM

## 2022-04-29 PROCEDURE — 3079F DIAST BP 80-89 MM HG: CPT | Performed by: ORTHOPAEDIC SURGERY

## 2022-04-29 PROCEDURE — 3074F SYST BP LT 130 MM HG: CPT | Performed by: ORTHOPAEDIC SURGERY

## 2022-04-29 PROCEDURE — 20610 DRAIN/INJ JOINT/BURSA W/O US: CPT | Performed by: PHYSICIAN ASSISTANT

## 2022-04-29 PROCEDURE — 73564 X-RAY EXAM KNEE 4 OR MORE: CPT | Performed by: ORTHOPAEDIC SURGERY

## 2022-04-29 RX ORDER — TRIAMCINOLONE ACETONIDE 40 MG/ML
40 INJECTION, SUSPENSION INTRA-ARTICULAR; INTRAMUSCULAR ONCE
Status: COMPLETED | OUTPATIENT
Start: 2022-04-29 | End: 2022-04-29

## 2022-04-29 RX ORDER — MELOXICAM 15 MG/1
15 TABLET ORAL DAILY
Qty: 30 TABLET | Refills: 0 | Status: SHIPPED | OUTPATIENT
Start: 2022-04-29

## 2022-04-29 RX ADMIN — TRIAMCINOLONE ACETONIDE 40 MG: 40 INJECTION, SUSPENSION INTRA-ARTICULAR; INTRAMUSCULAR at 09:40:00

## 2022-04-29 NOTE — ADDENDUM NOTE
Addended by: Brian Gamez on: 4/29/2022 11:48 AM     Modules accepted: Orders, Level of Service, SmartSet

## 2022-04-29 NOTE — PROGRESS NOTES
Per verbal order from Dr. Jane Tracy, draw up and 4ml of 0.5% Marcaine and 1ml of Kenalog 40 for injection into left knee. Yeyo Spencer RN  Patient provided education handout for cortisone injection.

## 2022-05-01 NOTE — PROGRESS NOTES
Operative Report                     Shave/  Tangential biopsy     Clinical diagnosis:    Size of lesion:    Location:pt with history of MIS irregular macule  Spec 1 Description >>>>>: left lower leg  Spec 1 Comment: 3x4 mm tan brown papule r/o atypical nevus    Procedure: With patient in appropriate position the skin of the above was scrubbed with alcohol. Anesthesia was obtained with 1% Xylocaine with epinephrine. The skin surrounding the lesion was placed under tension and the lesion was incised using a #15 scalpel blade. The specimen was sent for histopathologic exam.    Hemostasis was obtained with electrocautery/aluminum chloride. Estimated blood loss less than 2 cc. Biopsy dressed with Polysporin, bandage. Pressure dressing:   No    Complications: None    Written instructions given and reviewed with patient    Await pathology    Contact information reviewed.     Procedural physician:  Hollie Vincent MD

## 2022-05-20 ENCOUNTER — TELEPHONE (OUTPATIENT)
Dept: DERMATOLOGY CLINIC | Facility: CLINIC | Age: 67
End: 2022-05-20

## 2022-05-21 NOTE — TELEPHONE ENCOUNTER
Please let pt know I did send econazole cream in place of the Naftine. Not really the same, but close. I did route prior message back with note about coupon for the naftine, but I don't see this was ever discussed with pt.   See prior te

## 2022-07-29 ENCOUNTER — OFFICE VISIT (OUTPATIENT)
Dept: DERMATOLOGY CLINIC | Facility: CLINIC | Age: 67
End: 2022-07-29
Payer: COMMERCIAL

## 2022-07-29 DIAGNOSIS — Z85.820 PERSONAL HISTORY OF MALIGNANT MELANOMA OF SKIN: ICD-10-CM

## 2022-07-29 DIAGNOSIS — D22.9 ATYPICAL NEVI: ICD-10-CM

## 2022-07-29 DIAGNOSIS — D22.9 MULTIPLE NEVI: Primary | ICD-10-CM

## 2022-07-29 DIAGNOSIS — D23.9 BENIGN NEOPLASM OF SKIN, UNSPECIFIED LOCATION: ICD-10-CM

## 2022-07-29 DIAGNOSIS — L81.4 LENTIGO: ICD-10-CM

## 2022-07-29 DIAGNOSIS — L71.9 ROSACEA: ICD-10-CM

## 2022-07-29 DIAGNOSIS — L30.9 DERMATITIS: ICD-10-CM

## 2022-07-29 PROCEDURE — 99214 OFFICE O/P EST MOD 30 MIN: CPT | Performed by: DERMATOLOGY

## 2022-07-29 RX ORDER — BETAMETHASONE DIPROPIONATE 0.5 MG/G
CREAM TOPICAL
Qty: 50 G | Refills: 1 | Status: SHIPPED | OUTPATIENT
Start: 2022-07-29

## 2022-09-01 DIAGNOSIS — E78.00 HYPERCHOLESTEROLEMIA: ICD-10-CM

## 2022-09-01 RX ORDER — ATORVASTATIN CALCIUM 10 MG/1
10 TABLET, FILM COATED ORAL NIGHTLY
Qty: 90 TABLET | Refills: 1 | Status: SHIPPED | OUTPATIENT
Start: 2022-09-01

## 2022-09-02 NOTE — TELEPHONE ENCOUNTER
Refill passed per [facility] protocol   Requested Prescriptions   Pending Prescriptions Disp Refills    ATORVASTATIN 10 MG Oral Tab [Pharmacy Med Name: ATORVASTATIN 10MG TABLETS] 90 tablet 1     Sig: TAKE 1 TABLET(10 MG) BY MOUTH EVERY NIGHT        Cholesterol Medication Protocol Passed - 9/1/2022  8:23 AM        Passed - ALT in past 12 months        Passed - LDL in past 12 months        Passed - Last ALT < 80       Lab Results   Component Value Date    ALT 26 12/09/2021             Passed - Last LDL < 130     Lab Results   Component Value Date    LDL 86 12/09/2021               Passed - In person appointment or virtual visit in the past 12 mos or appointment in next 3 mos       Recent Outpatient Visits              1 month ago Multiple nevi    [address] [facility] Flakita Singer MD    Office Visit    4 months ago [address] for Jodine Leventhal, MD    Office Visit    4 months ago Neoplasm of uncertain behavior of skin    [address] [facility] Flakita Singer MD    Office Visit    6 months ago Insufficiency fracture    [facility], [facility], [address], Enmanuel Murrieta MD    Office Visit    7 months ago Melanoma in situ of left upper extremity including shoulder [facility])    [facility] Jarett Lancaster MD    Office Visit     Future Appointments         Provider Department Appt Notes    In 1 week Eddie Guevara MD [facility], [address], [place] 5 mo fu    In 2 months [facility] Singer [facility] [address] 3 mo skin check

## 2022-09-14 ENCOUNTER — OFFICE VISIT (OUTPATIENT)
Dept: ORTHOPEDICS CLINIC | Facility: CLINIC | Age: 67
End: 2022-09-14
Payer: COMMERCIAL

## 2022-09-14 DIAGNOSIS — M17.12 OSTEOARTHRITIS OF LEFT KNEE, UNSPECIFIED OSTEOARTHRITIS TYPE: Primary | ICD-10-CM

## 2022-09-14 PROCEDURE — 99213 OFFICE O/P EST LOW 20 MIN: CPT | Performed by: ORTHOPAEDIC SURGERY

## 2022-11-07 ENCOUNTER — OFFICE VISIT (OUTPATIENT)
Dept: DERMATOLOGY CLINIC | Facility: CLINIC | Age: 67
End: 2022-11-07
Payer: COMMERCIAL

## 2022-11-07 DIAGNOSIS — D22.9 ATYPICAL NEVI: ICD-10-CM

## 2022-11-07 DIAGNOSIS — L02.92 BOIL: Primary | ICD-10-CM

## 2022-11-07 DIAGNOSIS — L30.9 DERMATITIS: ICD-10-CM

## 2022-11-07 DIAGNOSIS — L81.4 LENTIGO: ICD-10-CM

## 2022-11-07 DIAGNOSIS — Z85.820 PERSONAL HISTORY OF MALIGNANT MELANOMA OF SKIN: ICD-10-CM

## 2022-11-07 DIAGNOSIS — L71.9 ROSACEA: ICD-10-CM

## 2022-11-07 DIAGNOSIS — D22.9 MULTIPLE NEVI: ICD-10-CM

## 2022-11-07 DIAGNOSIS — D23.9 BENIGN NEOPLASM OF SKIN, UNSPECIFIED LOCATION: ICD-10-CM

## 2022-11-07 PROCEDURE — 99214 OFFICE O/P EST MOD 30 MIN: CPT | Performed by: DERMATOLOGY

## 2022-11-07 RX ORDER — CLINDAMYCIN PHOSPHATE 10 MG/G
1 GEL TOPICAL 2 TIMES DAILY
Qty: 60 G | Refills: 12 | Status: SHIPPED | OUTPATIENT
Start: 2022-11-07

## 2023-03-06 DIAGNOSIS — E78.00 HYPERCHOLESTEROLEMIA: ICD-10-CM

## 2023-03-06 NOTE — TELEPHONE ENCOUNTER
I am sorry. I misunderstood. I thought you were going to see Dr. Cristóbal Yang. I contacted him last week and sent him the results. Please schedule an appointment with him. Your arthritis has gotten worse.
Patient wants Dr. Marva Farfan to call him with MRI results of his left knee. Someone was supposed to contact him about next steps. Please advise.
Please read message below
Sent mychart msg
all other ROS negative except as per HPI

## 2023-03-07 RX ORDER — ATORVASTATIN CALCIUM 10 MG/1
TABLET, FILM COATED ORAL
Qty: 90 TABLET | Refills: 0 | Status: SHIPPED | OUTPATIENT
Start: 2023-03-07

## 2023-03-08 NOTE — PROGRESS NOTES
Casimir Cushing is a 57year old male. HPI:     CC:  Patient presents with: Follow - Up: Referred here today by Dr. Masha Knight [Ortho] to have lesion on lower right leg examined. Pt denies itching or pain.          Allergies:  Penicillins    HISTORY: Patient was seen in clinic today for follow up.  Patient reports a relapse smoked 3 cigarettes per day since the last visit.  Patient remains on the prescribed tobacco cessation medication regimen of 1 mg Chantix BID without any side effects at this time.  No refill is needed.  Patient states that he has some nicotine patches at home.  Encouraged patient to resume the nicotine patch.   Patient is expecting high risk situations at his [employer].   Encouraged patient to throw the rest of his cigarettes away.  Reviewed strategies, habitual behavior, high risks situations, understanding urges and cravings, stress and relaxation with open discussion and additional interventions. Introduced lapses, relapses, understanding them and analyzing the situation of a lapse, conflict issues that may be linked to a lapse. Patient will continue bi weekly sessions.     Relation Age of Onset   • Stroke Mother      CVA; Cause of death   • Macular degeneration Mother    • Diabetes Father         12/04/17  1410   Weight: 225 lb   Height: 72\"       HPI:    Patient presents with:   Follow - Up: Referred here today by Dr. Kim Mccoy trunk, except scrotum  Benign neoplasm of skin of face  Benign neoplasm of scalp and skin of neck  Rosacea    See details on map. Remarkable for:    Erythema background papules over the central face cheeks zygomatic area nasal dorsum.   Inflammatory gr

## 2023-04-25 ENCOUNTER — TELEPHONE (OUTPATIENT)
Facility: CLINIC | Age: 68
End: 2023-04-25

## 2023-04-25 ENCOUNTER — TELEPHONE (OUTPATIENT)
Dept: CASE MANAGEMENT | Age: 68
End: 2023-04-25

## 2023-04-25 DIAGNOSIS — M17.9 OSTEOARTHRITIS OF KNEE, UNSPECIFIED LATERALITY, UNSPECIFIED OSTEOARTHRITIS TYPE: Primary | ICD-10-CM

## 2023-04-25 NOTE — TELEPHONE ENCOUNTER
Dr. Sary Cadena,     Patient has follow up appointment 4/26/23 with Dr. Nilo Keith. Pended referral please review diagnosis and sign off if you agree. Thank you.   Jordi Ko

## 2023-04-26 ENCOUNTER — OFFICE VISIT (OUTPATIENT)
Dept: ORTHOPEDICS CLINIC | Facility: CLINIC | Age: 68
End: 2023-04-26

## 2023-04-26 DIAGNOSIS — M17.12 PRIMARY OSTEOARTHRITIS OF LEFT KNEE: Primary | ICD-10-CM

## 2023-04-26 PROCEDURE — 99213 OFFICE O/P EST LOW 20 MIN: CPT | Performed by: ORTHOPAEDIC SURGERY

## 2023-05-16 ENCOUNTER — OFFICE VISIT (OUTPATIENT)
Facility: CLINIC | Age: 68
End: 2023-05-16

## 2023-05-16 VITALS
SYSTOLIC BLOOD PRESSURE: 126 MMHG | DIASTOLIC BLOOD PRESSURE: 78 MMHG | WEIGHT: 231 LBS | RESPIRATION RATE: 18 BRPM | BODY MASS INDEX: 31.29 KG/M2 | TEMPERATURE: 98 F | HEART RATE: 84 BPM | OXYGEN SATURATION: 96 % | HEIGHT: 72 IN

## 2023-05-16 DIAGNOSIS — E78.00 HYPERCHOLESTEROLEMIA: ICD-10-CM

## 2023-05-16 DIAGNOSIS — S83.242D TEAR OF MEDIAL MENISCUS OF LEFT KNEE, UNSPECIFIED TEAR TYPE, UNSPECIFIED WHETHER OLD OR CURRENT TEAR, SUBSEQUENT ENCOUNTER: Primary | ICD-10-CM

## 2023-05-16 DIAGNOSIS — E55.9 VITAMIN D DEFICIENCY: ICD-10-CM

## 2023-05-16 DIAGNOSIS — Z00.00 ROUTINE HEALTH MAINTENANCE: ICD-10-CM

## 2023-05-16 DIAGNOSIS — E66.9 OBESITY (BMI 30-39.9): ICD-10-CM

## 2023-05-16 DIAGNOSIS — Z56.6 WORK STRESS: ICD-10-CM

## 2023-05-16 PROCEDURE — 3008F BODY MASS INDEX DOCD: CPT | Performed by: INTERNAL MEDICINE

## 2023-05-16 PROCEDURE — 99214 OFFICE O/P EST MOD 30 MIN: CPT | Performed by: INTERNAL MEDICINE

## 2023-05-16 PROCEDURE — 3078F DIAST BP <80 MM HG: CPT | Performed by: INTERNAL MEDICINE

## 2023-05-16 PROCEDURE — 3074F SYST BP LT 130 MM HG: CPT | Performed by: INTERNAL MEDICINE

## 2023-05-16 RX ORDER — ATORVASTATIN CALCIUM 10 MG/1
10 TABLET, FILM COATED ORAL NIGHTLY
Qty: 90 TABLET | Refills: 1 | Status: SHIPPED | OUTPATIENT
Start: 2023-05-16

## 2023-05-16 SDOH — HEALTH STABILITY - MENTAL HEALTH: OTHER PHYSICAL AND MENTAL STRAIN RELATED TO WORK: Z56.6

## 2023-05-30 ENCOUNTER — TELEPHONE (OUTPATIENT)
Dept: CASE MANAGEMENT | Age: 68
End: 2023-05-30

## 2023-05-30 DIAGNOSIS — R52 PAIN AGGRAVATED BY WALKING: Primary | ICD-10-CM

## 2023-05-30 NOTE — TELEPHONE ENCOUNTER
Dr. Hayder Simon,     Patient is requesting a referral to see an in network podiatrist for pain when walking. Pended referral please review diagnosis and sign off if you agree. Thank you.   Jordi Ko

## 2023-06-01 ENCOUNTER — TELEPHONE (OUTPATIENT)
Facility: CLINIC | Age: 68
End: 2023-06-01

## 2023-06-01 DIAGNOSIS — M79.673 PAIN OF FOOT, UNSPECIFIED LATERALITY: Primary | ICD-10-CM

## 2023-06-01 NOTE — TELEPHONE ENCOUNTER
Patient is calling and states that he cannot see the podiatrist for 8 weeks. He wants to know if he can be proscribed pain medication to hold him over or if there is something else that can be done.

## 2023-06-01 NOTE — TELEPHONE ENCOUNTER
Referral for Podiatrist placed by Dr. Lizandro Owens today:  [address]  [facility] [address]  [address] [facility] [phone]     Patient contacted and patient made aware of Dr. Natasha Castro interpretation and recommendations. He is not taking Meloxicam, he will take Aleve as advised. He requests referral contact info be sent via [email or web address] --> sent. Patient verbalized understanding. No further questions or concerns at this time.

## 2023-06-01 NOTE — TELEPHONE ENCOUNTER
Dr Trina Heart:   Patient is asking for your input. Do you recommend taking OTC ibuprofen or aleve? Patient c/o Left foot pain; will need new orthotics. Pain 7/10. Unable to walk naturally. Doesn't usually take medications OTC. Dr Mary Reilly retired. Has appt with new podiatrist 7/20/23. Soonest he can get in. Patient currently in [place] for next 2 days.

## 2023-06-01 NOTE — TELEPHONE ENCOUNTER
Is he still taking meloxicam?  (prescribed a year ago by Dr. Remus Primrose). If not, okay to take Aleve, 1 twice daily. If he wants to try a different podiatry group to see if he can get a sooner appointment, he could see Dr. Aurelio Dobbs or any doc in the group.   [phone]

## 2023-07-13 ENCOUNTER — NURSE TRIAGE (OUTPATIENT)
Dept: INTERNAL MEDICINE CLINIC | Facility: CLINIC | Age: 68
End: 2023-07-13

## 2023-07-13 ENCOUNTER — TELEPHONE (OUTPATIENT)
Dept: INTERNAL MEDICINE CLINIC | Facility: CLINIC | Age: 68
End: 2023-07-13

## 2023-07-13 DIAGNOSIS — Z85.820 HISTORY OF MELANOMA: Primary | ICD-10-CM

## 2023-07-13 NOTE — TELEPHONE ENCOUNTER
Patient called stated that over 2 in a half years and the patient dermatologist information is not on patient chart patient would like a call back from the provider does not know the name of the dermatologist and mentioned a provider by the name of chencho and if it was listed which it was but patient stated could not confirm the firm name so does not know.

## 2023-07-13 NOTE — TELEPHONE ENCOUNTER
See referral-dermatology referral dated 12/9/2021 due to NEVUS.    MyChart active,  message sent for the reason of dermatology  request.

## 2023-07-13 NOTE — TELEPHONE ENCOUNTER
Action Requested: Summary for Provider     []  Critical Lab, Recommendations Needed  [] Need Additional Advice  []   FYI    []   Need Orders  [] Need Medications Sent to Pharmacy  []  Other     SUMMARY: appt made - left foot /ankle swelling last days worsening, walked a lot in [place], tender,do not wear compression socks, don't think diet related ,offered appt for eval,advised elevate at times     Reason for call: Foot Swelling  Onset: 5 days                    Reason for Disposition   Patient wants to be seen    Protocols used:  Ankle Swelling-A-OH
Additional Safety/Bands:

## 2023-07-14 ENCOUNTER — EKG ENCOUNTER (OUTPATIENT)
Dept: LAB | Age: 68
End: 2023-07-14
Attending: INTERNAL MEDICINE
Payer: COMMERCIAL

## 2023-07-14 ENCOUNTER — OFFICE VISIT (OUTPATIENT)
Dept: INTERNAL MEDICINE CLINIC | Facility: CLINIC | Age: 68
End: 2023-07-14

## 2023-07-14 ENCOUNTER — LAB ENCOUNTER (OUTPATIENT)
Dept: LAB | Age: 68
End: 2023-07-14
Attending: INTERNAL MEDICINE
Payer: COMMERCIAL

## 2023-07-14 VITALS
DIASTOLIC BLOOD PRESSURE: 76 MMHG | WEIGHT: 239 LBS | BODY MASS INDEX: 32.37 KG/M2 | HEIGHT: 72 IN | SYSTOLIC BLOOD PRESSURE: 130 MMHG | RESPIRATION RATE: 18 BRPM | HEART RATE: 79 BPM | OXYGEN SATURATION: 100 %

## 2023-07-14 DIAGNOSIS — E55.9 VITAMIN D DEFICIENCY: ICD-10-CM

## 2023-07-14 DIAGNOSIS — R60.0 BILATERAL LEG EDEMA: ICD-10-CM

## 2023-07-14 DIAGNOSIS — R60.0 BILATERAL LEG EDEMA: Primary | ICD-10-CM

## 2023-07-14 DIAGNOSIS — Z00.00 ROUTINE HEALTH MAINTENANCE: ICD-10-CM

## 2023-07-14 LAB
ALBUMIN SERPL-MCNC: 3.5 G/DL (ref 3.4–5)
ALBUMIN/GLOB SERPL: 1 {RATIO} (ref 1–2)
ALP LIVER SERPL-CCNC: 84 U/L
ALT SERPL-CCNC: 31 U/L
ANION GAP SERPL CALC-SCNC: 7 MMOL/L (ref 0–18)
AST SERPL-CCNC: 22 U/L (ref 15–37)
ATRIAL RATE: 70 BPM
BASOPHILS # BLD AUTO: 0.02 X10(3) UL (ref 0–0.2)
BASOPHILS NFR BLD AUTO: 0.4 %
BILIRUB SERPL-MCNC: 0.5 MG/DL (ref 0.1–2)
BUN BLD-MCNC: 17 MG/DL (ref 7–18)
BUN/CREAT SERPL: 18.1 (ref 10–20)
CALCIUM BLD-MCNC: 9.2 MG/DL (ref 8.5–10.1)
CHLORIDE SERPL-SCNC: 109 MMOL/L (ref 98–112)
CO2 SERPL-SCNC: 26 MMOL/L (ref 21–32)
COMPLEXED PSA SERPL-MCNC: 3.18 NG/ML (ref ?–4)
CREAT BLD-MCNC: 0.94 MG/DL
DEPRECATED RDW RBC AUTO: 44.6 FL (ref 35.1–46.3)
EOSINOPHIL # BLD AUTO: 0.21 X10(3) UL (ref 0–0.7)
EOSINOPHIL NFR BLD AUTO: 3.8 %
ERYTHROCYTE [DISTWIDTH] IN BLOOD BY AUTOMATED COUNT: 12.4 % (ref 11–15)
EST. AVERAGE GLUCOSE BLD GHB EST-MCNC: 114 MG/DL (ref 68–126)
FASTING STATUS PATIENT QL REPORTED: NO
GFR SERPLBLD BASED ON 1.73 SQ M-ARVRAT: 88 ML/MIN/1.73M2 (ref 60–?)
GLOBULIN PLAS-MCNC: 3.4 G/DL (ref 2.8–4.4)
GLUCOSE BLD-MCNC: 131 MG/DL (ref 70–99)
HBA1C MFR BLD: 5.6 % (ref ?–5.7)
HCT VFR BLD AUTO: 42.9 %
HGB BLD-MCNC: 14.6 G/DL
IMM GRANULOCYTES # BLD AUTO: 0.02 X10(3) UL (ref 0–1)
IMM GRANULOCYTES NFR BLD: 0.4 %
LYMPHOCYTES # BLD AUTO: 1.68 X10(3) UL (ref 1–4)
LYMPHOCYTES NFR BLD AUTO: 30.4 %
MCH RBC QN AUTO: 33.1 PG (ref 26–34)
MCHC RBC AUTO-ENTMCNC: 34 G/DL (ref 31–37)
MCV RBC AUTO: 97.3 FL
MONOCYTES # BLD AUTO: 0.57 X10(3) UL (ref 0.1–1)
MONOCYTES NFR BLD AUTO: 10.3 %
NEUTROPHILS # BLD AUTO: 3.02 X10 (3) UL (ref 1.5–7.7)
NEUTROPHILS # BLD AUTO: 3.02 X10(3) UL (ref 1.5–7.7)
NEUTROPHILS NFR BLD AUTO: 54.7 %
OSMOLALITY SERPL CALC.SUM OF ELEC: 297 MOSM/KG (ref 275–295)
P AXIS: 12 DEGREES
P-R INTERVAL: 154 MS
PLATELET # BLD AUTO: 154 10(3)UL (ref 150–450)
POTASSIUM SERPL-SCNC: 3.7 MMOL/L (ref 3.5–5.1)
PROT SERPL-MCNC: 6.9 G/DL (ref 6.4–8.2)
Q-T INTERVAL: 394 MS
QRS DURATION: 102 MS
QTC CALCULATION (BEZET): 425 MS
R AXIS: -9 DEGREES
RBC # BLD AUTO: 4.41 X10(6)UL
SODIUM SERPL-SCNC: 142 MMOL/L (ref 136–145)
T AXIS: 5 DEGREES
VENTRICULAR RATE: 70 BPM
VIT D+METAB SERPL-MCNC: 15.2 NG/ML (ref 30–100)
WBC # BLD AUTO: 5.5 X10(3) UL (ref 4–11)

## 2023-07-14 PROCEDURE — 93010 ELECTROCARDIOGRAM REPORT: CPT | Performed by: STUDENT IN AN ORGANIZED HEALTH CARE EDUCATION/TRAINING PROGRAM

## 2023-07-14 PROCEDURE — 3008F BODY MASS INDEX DOCD: CPT | Performed by: INTERNAL MEDICINE

## 2023-07-14 PROCEDURE — 3075F SYST BP GE 130 - 139MM HG: CPT | Performed by: INTERNAL MEDICINE

## 2023-07-14 PROCEDURE — 93005 ELECTROCARDIOGRAM TRACING: CPT

## 2023-07-14 PROCEDURE — 3078F DIAST BP <80 MM HG: CPT | Performed by: INTERNAL MEDICINE

## 2023-07-14 PROCEDURE — 82306 VITAMIN D 25 HYDROXY: CPT

## 2023-07-14 PROCEDURE — 80053 COMPREHEN METABOLIC PANEL: CPT

## 2023-07-14 PROCEDURE — 36415 COLL VENOUS BLD VENIPUNCTURE: CPT

## 2023-07-14 PROCEDURE — 83036 HEMOGLOBIN GLYCOSYLATED A1C: CPT

## 2023-07-14 PROCEDURE — 85025 COMPLETE CBC W/AUTO DIFF WBC: CPT

## 2023-07-14 PROCEDURE — 99214 OFFICE O/P EST MOD 30 MIN: CPT | Performed by: INTERNAL MEDICINE

## 2023-07-14 NOTE — TELEPHONE ENCOUNTER
Patient has history of melanoma. Patient needs follow up with derm Dr. Becca Hernandez every 3 months.

## 2023-07-15 RX ORDER — ERGOCALCIFEROL 1.25 MG/1
50000 CAPSULE ORAL WEEKLY
Qty: 12 CAPSULE | Refills: 1 | Status: SHIPPED | OUTPATIENT
Start: 2023-07-15

## 2023-07-17 ENCOUNTER — TELEPHONE (OUTPATIENT)
Dept: CASE MANAGEMENT | Age: 68
End: 2023-07-17

## 2023-07-17 DIAGNOSIS — Z85.820 HISTORY OF MELANOMA: Primary | ICD-10-CM

## 2023-07-17 NOTE — TELEPHONE ENCOUNTER
Dr. Orly Ambriz,     Patient needs referral to Dr. Royce Delgado. Pended referral please review diagnosis and sign off if you agree. Thank you.   Jordi Ko

## 2023-08-01 ENCOUNTER — OFFICE VISIT (OUTPATIENT)
Facility: CLINIC | Age: 68
End: 2023-08-01

## 2023-08-01 VITALS
BODY MASS INDEX: 32.32 KG/M2 | WEIGHT: 238.63 LBS | HEIGHT: 72 IN | DIASTOLIC BLOOD PRESSURE: 74 MMHG | HEART RATE: 86 BPM | OXYGEN SATURATION: 99 % | RESPIRATION RATE: 18 BRPM | SYSTOLIC BLOOD PRESSURE: 136 MMHG

## 2023-08-01 DIAGNOSIS — E78.00 HYPERCHOLESTEROLEMIA: ICD-10-CM

## 2023-08-01 DIAGNOSIS — F43.9 STRESS: ICD-10-CM

## 2023-08-01 DIAGNOSIS — E55.9 VITAMIN D DEFICIENCY: ICD-10-CM

## 2023-08-01 DIAGNOSIS — D03.62 MELANOMA IN SITU OF LEFT UPPER EXTREMITY INCLUDING SHOULDER (HCC): ICD-10-CM

## 2023-08-01 DIAGNOSIS — M79.671 FOOT PAIN, BILATERAL: Primary | ICD-10-CM

## 2023-08-01 DIAGNOSIS — M79.672 FOOT PAIN, BILATERAL: Primary | ICD-10-CM

## 2023-08-01 PROBLEM — M25.562 CHRONIC PAIN OF LEFT KNEE: Status: RESOLVED | Noted: 2019-12-12 | Resolved: 2023-08-01

## 2023-08-01 PROBLEM — G89.29 CHRONIC PAIN OF LEFT KNEE: Status: RESOLVED | Noted: 2019-12-12 | Resolved: 2023-08-01

## 2023-08-01 PROCEDURE — 3078F DIAST BP <80 MM HG: CPT | Performed by: INTERNAL MEDICINE

## 2023-08-01 PROCEDURE — 3008F BODY MASS INDEX DOCD: CPT | Performed by: INTERNAL MEDICINE

## 2023-08-01 PROCEDURE — 3075F SYST BP GE 130 - 139MM HG: CPT | Performed by: INTERNAL MEDICINE

## 2023-08-01 PROCEDURE — 99214 OFFICE O/P EST MOD 30 MIN: CPT | Performed by: INTERNAL MEDICINE

## 2023-08-01 RX ORDER — ATORVASTATIN CALCIUM 10 MG/1
10 TABLET, FILM COATED ORAL NIGHTLY
Qty: 90 TABLET | Refills: 1 | Status: SHIPPED | OUTPATIENT
Start: 2023-08-01

## 2023-08-07 NOTE — TELEPHONE ENCOUNTER
5 year colonoscopy recall entered and health maintenance updated as requested by Dr. Griselda Pena. colonoscopy done on 3/8/21 and due on 3/8/26. Elidel Counseling: Patient may experience a mild burning sensation during topical application. Elidel is not approved in children less than 2 years of age. There have been case reports of hematologic and skin malignancies in patients using topical calcineurin inhibitors although causality is questionable.

## 2023-09-14 ENCOUNTER — TELEPHONE (OUTPATIENT)
Dept: PHYSICAL THERAPY | Facility: HOSPITAL | Age: 68
End: 2023-09-14

## 2023-09-21 ENCOUNTER — OFFICE VISIT (OUTPATIENT)
Dept: PHYSICAL THERAPY | Age: 68
End: 2023-09-21
Attending: INTERNAL MEDICINE
Payer: COMMERCIAL

## 2023-09-21 ENCOUNTER — IMMUNIZATION (OUTPATIENT)
Dept: FAMILY MEDICINE CLINIC | Facility: CLINIC | Age: 68
End: 2023-09-21
Payer: COMMERCIAL

## 2023-09-21 DIAGNOSIS — M25.551 BILATERAL HIP PAIN: Primary | ICD-10-CM

## 2023-09-21 DIAGNOSIS — M79.671 BILATERAL FOOT PAIN: ICD-10-CM

## 2023-09-21 DIAGNOSIS — M25.552 BILATERAL HIP PAIN: Primary | ICD-10-CM

## 2023-09-21 DIAGNOSIS — M79.672 BILATERAL FOOT PAIN: ICD-10-CM

## 2023-09-21 DIAGNOSIS — Z23 NEED FOR VACCINATION: Primary | ICD-10-CM

## 2023-09-21 PROCEDURE — 97110 THERAPEUTIC EXERCISES: CPT

## 2023-09-21 PROCEDURE — 90662 IIV NO PRSV INCREASED AG IM: CPT | Performed by: NURSE PRACTITIONER

## 2023-09-21 PROCEDURE — 97162 PT EVAL MOD COMPLEX 30 MIN: CPT

## 2023-09-21 PROCEDURE — 90471 IMMUNIZATION ADMIN: CPT | Performed by: NURSE PRACTITIONER

## 2023-09-28 ENCOUNTER — OFFICE VISIT (OUTPATIENT)
Dept: PHYSICAL THERAPY | Age: 68
End: 2023-09-28
Attending: INTERNAL MEDICINE
Payer: COMMERCIAL

## 2023-09-28 PROCEDURE — 97140 MANUAL THERAPY 1/> REGIONS: CPT

## 2023-09-28 PROCEDURE — 97110 THERAPEUTIC EXERCISES: CPT

## 2023-09-28 NOTE — PROGRESS NOTES
Dx: Bilateral hip pain (M25.551,M25.552)  Bilateral foot pain (M79.671,M79.672)          Insurance (Authorized # of Visits): [facility] (5 v)           Authorizing Physician: Dr. Jhony Phillips MD visit: post therapy  Fall Risk: standard         Precautions: n/a             Subjective: Began exercises, feels he must do more. He feels better rolling with ice. Pt notes hip feeling better with stretching    PAS 2-3/10  Objective:   TTP L calcaneus  Quad weakness/fatigue sit<>stand  Fair core activation     Assessment:   Treatment addressed soft tissue dysfunction of plantar fascia with release as well as passive stretching. Progression of plantar flexion strengthening/stability. Patient educated on proper sit<>stand as well as core strengthening for HEP. Recommending patient continue to ice and stretch multiple times a day. Goals:   Goals: (to be met in 8-10 visits)     Pt will demonstrate improved DF AROM to >4 degrees to promote proper foot clearance during gait and greater ease descending stairs without compensation  Pt will have increased ankle strength to 5/5 throughout for improved ankle control with ADLs such as prolonged gait and stair negotiation  Pt will have improved SLS to >15s for increased ankle stability with ambulation on uneven surfaces such as gravel and grass   Pt will report <2/10 pain with walking distances > 200 yards    Pt will demonstrate knee to wall distance within a normal for age and gender (< 13 cm) suggesting improvement in ankle DF mobility   Pt will be independent and compliant with comprehensive HEP to maintain progress achieved in PT    Plan: alleviate pain; improve ankle ROM/mobility, flexibility, strength/stability, balance, and altered gait and functional mechanics    Date: 9/28/2023  TX#: 2/5 Date:                 TX#: 3/ Date:                 TX#: 4/ Date:                 TX#: 5/ Date:    Tx#: 6/   Ther ex:  Bilateral ankle DF/PF passive stretching 10 x 10 cts  Dead bug unilateral R/L 10x  Sit<>stand (split stance) R/L 10x  Gastrocs stretch (incline) 3 x 20 cts  Soleus stretch (incline) 3 x 20 cts   Heel raises (tennis ball) 2 x 10              Manual:  Bilateral plantar fascia release; gastroc/soleus STM                  HEP: (review); heel raises (tennis ball); sit<>stand; dead bug    Charges: 2TE (30 min); 1 MM (15 min)      Total Timed Treatment: 45 min  Total Treatment Time: 45 min

## 2023-09-29 RX ORDER — BETAMETHASONE DIPROPIONATE 0.5 MG/G
CREAM TOPICAL
Qty: 50 G | Refills: 0 | Status: SHIPPED | OUTPATIENT
Start: 2023-09-29

## 2023-09-29 NOTE — TELEPHONE ENCOUNTER
Refill Request for medication(s): Betamethasone Dipropionate Aug 0.05 % External Cream     Last Office Visit:11/2022. Upcoming appts 10/2023 and 1/2024    Last Refill: n/a     Pharmacy, Dosage verified:     Condition Update (if applicable): msg sent    Rx pended and sent to provider for approval, please advise. Thank You!

## 2023-10-03 ENCOUNTER — OFFICE VISIT (OUTPATIENT)
Dept: PHYSICAL THERAPY | Age: 68
End: 2023-10-03
Attending: INTERNAL MEDICINE
Payer: COMMERCIAL

## 2023-10-03 ENCOUNTER — OFFICE VISIT (OUTPATIENT)
Facility: CLINIC | Age: 68
End: 2023-10-03

## 2023-10-03 VITALS
HEART RATE: 84 BPM | OXYGEN SATURATION: 99 % | HEIGHT: 72 IN | RESPIRATION RATE: 18 BRPM | SYSTOLIC BLOOD PRESSURE: 122 MMHG | WEIGHT: 233 LBS | BODY MASS INDEX: 31.56 KG/M2 | DIASTOLIC BLOOD PRESSURE: 66 MMHG

## 2023-10-03 DIAGNOSIS — F43.9 STRESS: ICD-10-CM

## 2023-10-03 DIAGNOSIS — E55.9 VITAMIN D DEFICIENCY: ICD-10-CM

## 2023-10-03 DIAGNOSIS — Z00.00 ROUTINE HEALTH MAINTENANCE: Primary | ICD-10-CM

## 2023-10-03 DIAGNOSIS — E78.00 HYPERCHOLESTEROLEMIA: ICD-10-CM

## 2023-10-03 DIAGNOSIS — M79.672 FOOT PAIN, BILATERAL: ICD-10-CM

## 2023-10-03 DIAGNOSIS — M79.671 FOOT PAIN, BILATERAL: ICD-10-CM

## 2023-10-03 PROCEDURE — 3074F SYST BP LT 130 MM HG: CPT | Performed by: INTERNAL MEDICINE

## 2023-10-03 PROCEDURE — 3078F DIAST BP <80 MM HG: CPT | Performed by: INTERNAL MEDICINE

## 2023-10-03 PROCEDURE — 99397 PER PM REEVAL EST PAT 65+ YR: CPT | Performed by: INTERNAL MEDICINE

## 2023-10-03 PROCEDURE — 97110 THERAPEUTIC EXERCISES: CPT

## 2023-10-03 PROCEDURE — 3008F BODY MASS INDEX DOCD: CPT | Performed by: INTERNAL MEDICINE

## 2023-10-03 PROCEDURE — 99213 OFFICE O/P EST LOW 20 MIN: CPT | Performed by: INTERNAL MEDICINE

## 2023-10-03 RX ORDER — ERGOCALCIFEROL 1.25 MG/1
50000 CAPSULE ORAL WEEKLY
Qty: 12 CAPSULE | Refills: 1 | Status: SHIPPED | OUTPATIENT
Start: 2023-10-03

## 2023-10-03 NOTE — PATIENT INSTRUCTIONS
Do fasting labs soon. Fast for 12 hours. Water is okay. My last day will  be January 11, 2024. I recommend that following providers:    [facility] (Internal Medicine) [phone]  Dr. Courtney Benson (Internal Medicine): [phone]  [facility]. Sharmin Fry.  [facility]  [phone]  Slava Vazquez     [facility] 1st floor (Internal Medicine) [phone]  Dr. Andree Sotelo Office 3rd floor (Family Medicine)  [phone]   Dr. Den FORMAN

## 2023-10-03 NOTE — ASSESSMENT & PLAN NOTE
Continue vit d weekly for 3 more months, then check level. Likely will change to over-the-counter vitamin D at that time.

## 2023-10-03 NOTE — PROGRESS NOTES
Dx: Bilateral hip pain (M25.551,M25.552)  Bilateral foot pain (M79.671,M79.672)          Insurance (Authorized # of Visits): [address] [facility] (5 v)           Authorizing Physician: Dr. Sary Phillips MD visit: post therapy  Fall Risk: standard         Precautions: n/a             Subjective: Pt notes feeling more in his R hip with exercise. However, stretching and ice rolling feels better for his feet. PAS 4/10  Objective:   TTP L calcaneus  Quad weakness/fatigue sit<>stand  Fair core activation   Fair single leg balance     Assessment:   Treatment addressed both R hip flexibility and strength/stability. Reviewed several hip stretches, recommending patient not strain, but feel a light stretch. Given handout. Progression of closed chain hip and ankle stretching and stability, incorporating uneven surfaces. Noted fair single leg balance, requiring UE support      Goals:   Goals: (to be met in 8-10 visits)     Pt will demonstrate improved DF AROM to >4 degrees to promote proper foot clearance during gait and greater ease descending stairs without compensation  Pt will have increased ankle strength to 5/5 throughout for improved ankle control with ADLs such as prolonged gait and stair negotiation  Pt will have improved SLS to >15s for increased ankle stability with ambulation on uneven surfaces such as gravel and grass   Pt will report <2/10 pain with walking distances > 200 yards    Pt will demonstrate knee to wall distance within a normal for age and gender (< 13 cm) suggesting improvement in ankle DF mobility   Pt will be independent and compliant with comprehensive HEP to maintain progress achieved in PT    Plan: alleviate pain; improve ankle ROM/mobility, flexibility, strength/stability, balance, and altered gait and functional mechanics    Date: 9/28/2023  TX#: 2/5 Date: 10/3/23                 TX#: 3/5 Date:                 TX#: 4/ Date:                 TX#: 5/ Date:    Tx#: 6/   Ther ex:  Bilateral ankle DF/PF passive stretching 10 x 10 cts  Dead bug unilateral R/L 10x  Sit<>stand (split stance) R/L 10x  Gastrocs stretch (incline) 3 x 20 cts  Soleus stretch (incline) 3 x 20 cts   Heel raises (tennis ball) 2 x 10 Ther ex:  Hamstring stretch with belt 3 x 20 cts   Figure 4 stretch x 20 cts   Thread the needle Figure 4 x 20 cts  ITB stretch (rotation) x 20 cts  Sdly hip rectangle 6x  Gastrocs stretch (incline) 3 x 20 cts  SL kicks (aeromat) R/L 10x  Modified dead lift R/L 10x  Tandem walking 2 x 20 ft  Side stepping GTB 4 x 20 ft                 Manual:  Bilateral plantar fascia release; gastroc/soleus STM     Manual:  NA             HEP: (review); heel raises (tennis ball); sit<>stand; dead bug    Charges: 3TE    Total Timed Treatment: 42 min  Total Treatment Time: 45 min

## 2023-10-05 ENCOUNTER — APPOINTMENT (OUTPATIENT)
Dept: PHYSICAL THERAPY | Age: 68
End: 2023-10-05
Attending: INTERNAL MEDICINE
Payer: COMMERCIAL

## 2023-10-12 ENCOUNTER — OFFICE VISIT (OUTPATIENT)
Dept: PHYSICAL THERAPY | Age: 68
End: 2023-10-12
Attending: INTERNAL MEDICINE
Payer: COMMERCIAL

## 2023-10-12 PROCEDURE — 97110 THERAPEUTIC EXERCISES: CPT

## 2023-10-12 PROCEDURE — 97140 MANUAL THERAPY 1/> REGIONS: CPT

## 2023-10-12 NOTE — PROGRESS NOTES
Dx: Bilateral hip pain (M25.551,M25.552)  Bilateral foot pain (M79.671,M79.672)          Insurance (Authorized # of Visits): [facility] (5 v)           Authorizing Physician: Dr. Starr Lennox Next MD visit: post therapy  Fall Risk: standard         Precautions: n/a             Subjective: Pt notes less hip pain. Concerned with stability   PAS 2/10  Objective:   TTP L calcaneus  Quad weakness/fatigue sit<>stand  Fair core activation   Fair single leg balance     Assessment:   Treatment addressed soft tissue dysfunction with manual release. Noted improvement in tissue mobility at the arch, pain at the heel. Patient demonstrates fair (-) single leg stability with excessive hip and ankle strategies. Progression of balance activities on uneven surfaces as well as decreasing base of support. Patient requires intermittent UE support, but able to self correct. Goals:   Goals: (to be met in 8-10 visits)     Pt will demonstrate improved DF AROM to >4 degrees to promote proper foot clearance during gait and greater ease descending stairs without compensation  Pt will have increased ankle strength to 5/5 throughout for improved ankle control with ADLs such as prolonged gait and stair negotiation  Pt will have improved SLS to >15s for increased ankle stability with ambulation on uneven surfaces such as gravel and grass   Pt will report <2/10 pain with walking distances > 200 yards    Pt will demonstrate knee to wall distance within a normal for age and gender (< 13 cm) suggesting improvement in ankle DF mobility   Pt will be independent and compliant with comprehensive HEP to maintain progress achieved in PT    Plan: alleviate pain; improve ankle ROM/mobility, flexibility, strength/stability, balance, and altered gait and functional mechanics    Date: 9/28/2023  TX#: 2/5 Date: 10/3/23                 TX#: 3/5 Date: 10/12/23                 TX#: 4/5 Date:                 TX#: 5/ Date:    Tx#: 6/ Ther ex:  Bilateral ankle DF/PF passive stretching 10 x 10 cts  Dead bug unilateral R/L 10x  Sit<>stand (split stance) R/L 10x  Gastrocs stretch (incline) 3 x 20 cts  Soleus stretch (incline) 3 x 20 cts   Heel raises (tennis ball) 2 x 10 Ther ex:  Hamstring stretch with belt 3 x 20 cts   Figure 4 stretch x 20 cts   Thread the needle Figure 4 x 20 cts  ITB stretch (rotation) x 20 cts  Sdly hip rectangle 6x  Gastrocs stretch (incline) 3 x 20 cts  SL kicks (aeromat) R/L 10x  Modified dead lift R/L 10x  Tandem walking 2 x 20 ft  Side stepping GTB 4 x 20 ft     Ther ex:  SL kicks on aeromat (1 UE) 20x  Rhomberg on aeromat with rotation 10x  Rockerboard DF/PF 20x  Modified dead lift R/L 10x  Gastrocs stretch (incline) 3 x 20 cts            Manual:  Bilateral plantar fascia release; gastroc/soleus STM     Manual:  NA Manual:  Bilateral plantar fascia release; gastroc/soleus STM            HEP: (review); heel raises (tennis ball); sit<>stand; dead bug    Charges: 2TE (25 min); 1MM (15 min)    Total Timed Treatment: 40 min  Total Treatment Time: 45 min

## 2023-10-24 ENCOUNTER — OFFICE VISIT (OUTPATIENT)
Dept: PHYSICAL THERAPY | Age: 68
End: 2023-10-24
Attending: INTERNAL MEDICINE
Payer: COMMERCIAL

## 2023-10-24 PROCEDURE — 97110 THERAPEUTIC EXERCISES: CPT

## 2023-10-24 NOTE — PROGRESS NOTES
Progress Note HMO    Dx: Bilateral hip pain (M25.551,M25.552)  Bilateral foot pain (M79.671,M79.672)          Insurance (Authorized # of Visits): Sameer Malone [facility] (5 v)           Authorizing Physician: Dr. Brigida Phillips MD visit: post therapy  Fall Risk: standard         Precautions: n/a             Subjective: Pt standing more over the weekend at a conference. Overall, patient notes more flexibility and less intense pain during the day. He still notes foot pain worse in the morning. He notes hip pain and soreness with walking, no significant change in the hip symptoms. PAS 2/10  Objective:   Less tenderness TTP L calcaneus  Gait: slightly antalgic, forward shoulders    L ankle DF 3 deg     Assessment:   Maria G Garcia has attended 5 sessions for L foot and R hip pain. Overall, patient responding well to treatment for his foot, signs and symptoms consistent with plantar fascitis. Overall he notes more flexibility and less intense pain during the day. Pt continues to note R hip pain, attributed to compensatory movement. He has pain walking. Overall, he demonstrates lack of flexibility, mobility, strength/stability. The patient will benefit from continued physical therapy to address functional limitations, meeting LTGs, 1-2x/wk for 5-6 wks.          Goals:   Goals: (to be met in 8-10 visits)     Pt will demonstrate improved DF AROM to >4 degrees to promote proper foot clearance during gait and greater ease descending stairs without compensation-APPROACHING  Pt will have increased ankle strength to 5/5 throughout for improved ankle control with ADLs such as prolonged gait and stair negotiation  Pt will have improved SLS to >15s for increased ankle stability with ambulation on uneven surfaces such as gravel and grass-PROG  Pt will report <2/10 pain with walking distances > 200 yards-MET    Pt will demonstrate knee to wall distance within a normal for age and gender (< 13 cm) suggesting improvement in ankle DF mobility-APPROACHING Pt will be independent and compliant with comprehensive HEP to maintain progress achieved in PT    Plan: alleviate pain; improve ankle ROM/mobility, flexibility, strength/stability, balance, and altered gait and functional mechanics    Date: 9/28/2023  TX#: 2/5 Date: 10/3/23                 TX#: 3/5 Date: 10/12/23                 TX#: 4/5 Date: 10/24/23                 TX#: 5/5 Date: Tx#: 6/   Ther ex:  Bilateral ankle DF/PF passive stretching 10 x 10 cts  Dead bug unilateral R/L 10x  Sit<>stand (split stance) R/L 10x  Gastrocs stretch (incline) 3 x 20 cts  Soleus stretch (incline) 3 x 20 cts   Heel raises (tennis ball) 2 x 10 Ther ex:  Hamstring stretch with belt 3 x 20 cts   Figure 4 stretch x 20 cts   Thread the needle Figure 4 x 20 cts  ITB stretch (rotation) x 20 cts  Sdly hip rectangle 6x  Gastrocs stretch (incline) 3 x 20 cts  SL kicks (aeromat) R/L 10x  Modified dead lift R/L 10x  Tandem walking 2 x 20 ft  Side stepping GTB 4 x 20 ft     Ther ex:  SL kicks on aeromat (1 UE) 20x  Rhomberg on aeromat with rotation 10x  Rockerboard DF/PF 20x  Modified dead lift R/L 10x  Gastrocs stretch (incline) 3 x 20 cts Ther ex:  ITB stretch belt 3 x 20 cts  Figure 4 stretch 3 x 20 cts  Piriformis stretch 3 x 20 cts  Sdly rectangle 10x  Sdly clam shell with resistance 20x  Side stepping band 4 x 15 steps   Rockerboard DF/PF 20x  Gastrocs stretch 3 x 20 cts           Manual:  Bilateral plantar fascia release; gastroc/soleus STM     Manual:  NA Manual:  Bilateral plantar fascia release; gastroc/soleus STM Manual: NA           HEP: (review); heel raises (tennis ball); sit<>stand; dead bug; clam shell; sdly rectangle, hip stretching; resisted side stepping     Charges: 3TE Total Timed Treatment: 40 min  Total Treatment Time: 42 min    The patient  was advised of these findings, precautions, and treatment options and has agreed to actively participate in planning and for this course of care.     Thank you for your referral. If you have any questions, please contact me at Dept: [phone]. Sincerely,  Electronically signed by therapist: Raquel Betancourt PT,DPT,OCS      Please co-sign or sign and return this letter via fax as soon as possible to [phone]. I certify the need for these services furnished under this plan of treatment and while under my care.     X___________________________________________________ Date____________________    Certification From: 31/94/5482  To:1/22/2024

## 2023-10-26 ENCOUNTER — OFFICE VISIT (OUTPATIENT)
Dept: PHYSICAL THERAPY | Age: 68
End: 2023-10-26
Attending: INTERNAL MEDICINE
Payer: COMMERCIAL

## 2023-10-26 PROCEDURE — 97140 MANUAL THERAPY 1/> REGIONS: CPT

## 2023-10-26 PROCEDURE — 97110 THERAPEUTIC EXERCISES: CPT

## 2023-10-26 NOTE — PROGRESS NOTES
Dx: Bilateral hip pain (M25.551,M25.552)  Bilateral foot pain (M79.671,M79.672)          Insurance (Authorized # of Visits): [facility] (5 v)           Authorizing Physician: Dr. Starr Lennox Next MD visit: post therapy  Fall Risk: standard         Precautions: n/a             Subjective:   Pt notes some soreness in his hip, but not bad. He would like to work on his foot   PAS 2/10  Objective:   Less tenderness TTP L calcaneus  Gait: slightly antalgic, forward shoulders    L ankle DF 3 deg     Assessment:   Treatment returned to addressing L plantar fascia. Treatment incorporated STM/MFR as well as IASTM to assist with remodeling dysfunctional tissue. Addition of piliates reformer to target gastrocs/soleus flexibility in a gravity eliminated position. Continue to address closed chain LE stability.             Goals:   Goals: (to be met in 8-10 visits)     Pt will demonstrate improved DF AROM to >4 degrees to promote proper foot clearance during gait and greater ease descending stairs without compensation-APPROACHING  Pt will have increased ankle strength to 5/5 throughout for improved ankle control with ADLs such as prolonged gait and stair negotiation  Pt will have improved SLS to >15s for increased ankle stability with ambulation on uneven surfaces such as gravel and grass-PROG  Pt will report <2/10 pain with walking distances > 200 yards-MET    Pt will demonstrate knee to wall distance within a normal for age and gender (< 13 cm) suggesting improvement in ankle DF mobility-APPROACHING   Pt will be independent and compliant with comprehensive HEP to maintain progress achieved in PT    Plan: alleviate pain; improve ankle ROM/mobility, flexibility, strength/stability, balance, and altered gait and functional mechanics    Date: 10/12/23                 TX#: 4/5 Date: 10/24/23                 TX#: 5/5 Date: 10/26/23   Tx#: 6/10   Ther ex:  SL kicks on aeromat (1 UE) 20x  Rhomberg on aeromat with rotation 10x  Rockerboard DF/PF 20x  Modified dead lift R/L 10x  Gastrocs stretch (incline) 3 x 20 cts Ther ex:  ITB stretch belt 3 x 20 cts  Figure 4 stretch 3 x 20 cts  Piriformis stretch 3 x 20 cts  Sdly rectangle 10x  Sdly clam shell with resistance 20x  Side stepping band 4 x 15 steps   Rockerboard DF/PF 20x  Gastrocs stretch 3 x 20 cts Ther ex:  Piliates reformer:    Alt heel drop (calf stretch) (all bands) 30x  Heel raises (all bands) 30x  Gastrocs/soleus stretch with toe ext on 1/2 foam roller 5 x 10 cts  Rockerboard DF/PF; M/L 20x  Rockerboard balance 2 x 1 min        Manual:  Bilateral plantar fascia release; gastroc/soleus STM Manual: NA Manual:  Bilateral plantar fascia release; gastroc/soleus STM  IASTM HG5 to midfoot, heel and gastroc         HEP: (review); heel raises (tennis ball); sit<>stand; dead bug; clam shell; sdly rectangle, hip stretching; resisted side stepping     Charges: 1TE (15 min); 2MM (25 min) Total Timed Treatment: 40 min  Total Treatment Time: 42 min

## 2023-10-28 ENCOUNTER — TELEPHONE (OUTPATIENT)
Dept: INTERNAL MEDICINE CLINIC | Facility: CLINIC | Age: 68
End: 2023-10-28

## 2023-10-28 NOTE — TELEPHONE ENCOUNTER
Dr. Venus Donaldson - Patient lost his AVS 10/3/23 Visit (\"After Visit Summary\") , which had the recommended PCPs circled. Wants another AVS copy. Asking that you please [place] the 3 female providers that you recommended him to see when you retire. Would like to  at [facility]. [facility] - please call patient when ready to  (either on Monday or Tuesday)          Patient called office. Date of birth and full name both confirmed. Lost his AVS with information. Advised it can be found on [employer]. He verbalizes understanding. But it also had PCPs recommended by DR. Venus Donaldson - reportedly 3 female PCPs in our practice. Requesting new AVS with circled recommendations again.

## 2023-10-30 ENCOUNTER — OFFICE VISIT (OUTPATIENT)
Dept: DERMATOLOGY CLINIC | Facility: CLINIC | Age: 68
End: 2023-10-30
Payer: COMMERCIAL

## 2023-10-30 DIAGNOSIS — D22.9 MULTIPLE NEVI: Primary | ICD-10-CM

## 2023-10-30 DIAGNOSIS — L57.0 AK (ACTINIC KERATOSIS): ICD-10-CM

## 2023-10-30 DIAGNOSIS — Z85.820 PERSONAL HISTORY OF MALIGNANT MELANOMA OF SKIN: ICD-10-CM

## 2023-10-30 DIAGNOSIS — L30.9 DERMATITIS: ICD-10-CM

## 2023-10-30 DIAGNOSIS — L82.1 SK (SEBORRHEIC KERATOSIS): ICD-10-CM

## 2023-10-30 DIAGNOSIS — B35.1 ONYCHOMYCOSIS: ICD-10-CM

## 2023-10-30 DIAGNOSIS — L81.4 LENTIGO: ICD-10-CM

## 2023-10-30 DIAGNOSIS — L71.9 ROSACEA: ICD-10-CM

## 2023-10-30 DIAGNOSIS — D23.9 BENIGN NEOPLASM OF SKIN, UNSPECIFIED LOCATION: ICD-10-CM

## 2023-10-30 DIAGNOSIS — D22.9 ATYPICAL NEVI: ICD-10-CM

## 2023-10-30 PROCEDURE — 99214 OFFICE O/P EST MOD 30 MIN: CPT | Performed by: DERMATOLOGY

## 2023-10-30 NOTE — TELEPHONE ENCOUNTER
Noted, thank you.        [facility] - please Call patient when ready to  (either on Monday or Tuesday)

## 2023-11-01 ENCOUNTER — TELEPHONE (OUTPATIENT)
Dept: INTERNAL MEDICINE CLINIC | Facility: CLINIC | Age: 68
End: 2023-11-01

## 2023-11-01 NOTE — TELEPHONE ENCOUNTER
Patient calling to check if the forms that were requested to be completed by Dr. Ventura are ready for pick-up at the [place] office, please call patient to advise.

## 2023-11-02 ENCOUNTER — OFFICE VISIT (OUTPATIENT)
Dept: PHYSICAL THERAPY | Age: 68
End: 2023-11-02
Attending: INTERNAL MEDICINE
Payer: COMMERCIAL

## 2023-11-02 PROCEDURE — 97110 THERAPEUTIC EXERCISES: CPT

## 2023-11-02 PROCEDURE — 97140 MANUAL THERAPY 1/> REGIONS: CPT

## 2023-11-02 NOTE — PROGRESS NOTES
Dx: Bilateral hip pain (M25.551,M25.552)  Bilateral foot pain (M79.671,M79.672)          Insurance (Authorized # of Visits): [address] [facility] (5 v)           Authorizing Physician: Dr. Alissa Phillips MD visit: post therapy  Fall Risk: standard         Precautions: n/a             Subjective:  Pt continues to do his exercises, noting improvement in his plantar fascia. He notes pain from cracked skin. Residual R hip pain, but better since starting therapy. PAS 2/10  Objective:   Less tenderness TTP L calcaneus  Gait: slightly antalgic, forward shoulders    L ankle DF 3 deg (5/5)  L ankle EVR/INV (5/5)  L ankle PF (4+/5)    Assessment:   Per request, treatment incorporated STM/MFR as well as IASTM to assist with remodeling dysfunctional tissue as patient notes most relief with these techniques. Progression on piliates reformer to target gastrocs/soleus flexibility in a gravity eliminated position. Continuing to address closed chain LE stability necessary for the R hip. Plan to continue 1-2x/wk for 4-5 sessions, addressing remaining deficits and long term goals.            Goals:   Goals: (to be met in 8-10 visits)     Pt will demonstrate improved DF AROM to >4 degrees to promote proper foot clearance during gait and greater ease descending stairs without compensation-APPROACHING  Pt will have increased ankle strength to 5/5 throughout for improved ankle control with ADLs such as prolonged gait and stair negotiation-1/2 MET (PF prog)  Pt will have improved SLS to >15s for increased ankle stability with ambulation on uneven surfaces such as gravel and grass-PROG  Pt will report <2/10 pain with walking distances > 200 yards-MET    Pt will demonstrate knee to wall distance within a normal for age and gender (< 13 cm) suggesting improvement in ankle DF mobility-APPROACHING   Pt will be independent and compliant with comprehensive HEP to maintain progress achieved in PT    Plan: alleviate pain; improve ankle ROM/mobility, flexibility, strength/stability, balance, and altered gait and functional mechanics    Date: 10/12/23                 TX#: 4/5 Date: 10/24/23                 TX#: 5/5 Date: 10/26/23   Tx#: 6/10 Date: 11/2/23  Tx#: 7/10   Ther ex:  SL kicks on aeromat (1 UE) 20x  Rhomberg on aeromat with rotation 10x  Rockerboard DF/PF 20x  Modified dead lift R/L 10x  Gastrocs stretch (incline) 3 x 20 cts Ther ex:  ITB stretch belt 3 x 20 cts  Figure 4 stretch 3 x 20 cts  Piriformis stretch 3 x 20 cts  Sdly rectangle 10x  Sdly clam shell with resistance 20x  Side stepping band 4 x 15 steps   Rockerboard DF/PF 20x  Gastrocs stretch 3 x 20 cts Ther ex:  Piliates reformer: Alt heel drop (calf stretch) (all bands) 30x  Heel raises (all bands) 30x  Gastrocs/soleus stretch with toe ext on 1/2 foam roller 5 x 10 cts  Rockerboard DF/PF; M/L 20x  Rockerboard balance 2 x 1 min Ther ex:  Piliates reformer:    Alt heel drop (calf stretch) (all bands) 30x  Heel raises (all bands) 30x  Gastrocs/soleus stretch with toe ext on 1/2 foam roller 5 x 10 cts  Rockerboard DF/PF; M/L 20x           Manual:  Bilateral plantar fascia release; gastroc/soleus STM Manual: NA Manual:  Bilateral plantar fascia release; gastroc/soleus STM  IASTM HG5 to midfoot, heel and gastroc  Manual:  Bilateral plantar fascia release; gastroc/soleus STM  IASTM HG5 to midfoot, heel and gastroc          HEP: (review); heel raises (tennis ball); sit<>stand; dead bug; clam shell; sdly rectangle, hip stretching; resisted side stepping     Charges: 1TE (15 min); 2MM (25 min) Total Timed Treatment: 40 min  Total Treatment Time: 42 min

## 2023-11-12 NOTE — PROGRESS NOTES
Jose Guadalupe Mckinley is a 77year old male. HPI:     CC:    Chief Complaint   Patient presents with    Full Skin Exam     Hx of melanoma and dysplastic nevus. LOV 11/2022. Pt presents for full body exam r/t hx of skin ca. Allergies:  Penicillins    HISTORY:    Past Medical History:   Diagnosis Date    Colitis     1981    Dysplastic nevus 04/2022    Left lower leg    High cholesterol     History of blood transfusion 1981    no reactions    Melanoma in situ ([address] 01/2022    Left upper arm    Onychomycosis 2010    Osteoarthritis     Other and unspecified hyperlipidemia     Plantar fasciitis 2009, 2010    - Orthotics    Thoracic or lumbosacral neuritis or radiculitis, unspecified     Tinea 2009, 2010    Visual impairment     readers      Past Surgical History:   Procedure Laterality Date    COLONOSCOPY      COLONOSCOPY N/A 3/8/2021    Procedure: COLONOSCOPY;  Surgeon: Shanice Martino MD;  Location: [address] [place] [address] ENDOSCOPY    TONSILLECTOMY        Family History   Problem Relation Age of Onset    Stroke Mother         CVA; Cause of death    Macular degeneration Mother     Diabetes Father     Prostate Cancer Father 79      Social History     Socioeconomic History    Marital status: Single   Tobacco Use    Smoking status: Never    Smokeless tobacco: Never   Vaping Use    Vaping Use: Never used   Substance and Sexual Activity    Alcohol use:  Yes     Alcohol/week: 1.0 - 2.0 standard drink of alcohol     Types: 1 - 2 Shots of liquor per week     Comment: 1-2 drinks weekly    Drug use: No    Sexual activity: Not Currently   Other Topics Concern    Caffeine Concern Yes     Comment: Coffee, 3 cups daily;     Grew up on a farm No    History of tanning Yes    Outdoor occupation No    Reaction to local anesthetic No    Pt has a pacemaker No    Pt has a defibrillator No        Current Outpatient Medications   Medication Sig Dispense Refill    ergocalciferol 1.25 MG (89154 UT) Oral Cap Take 1 capsule (50,000 Units total) by mouth once a week. 12 capsule 1    Betamethasone Dipropionate Aug 0.05 % External Cream APPLY TO RASH ON NECK TWICE DAILY AS NEEDED 50 g 0    atorvastatin 10 MG Oral Tab Take 1 tablet (10 mg total) by mouth nightly. 90 tablet 1     Allergies: Allergies   Allergen Reactions    Penicillins UNKNOWN     Unable to remember reactions       Past Medical History:   Diagnosis Date    Colitis     1981    Dysplastic nevus 04/2022    Left lower leg    High cholesterol     History of blood transfusion 1981    no reactions    Melanoma in situ ([address] 01/2022    Left upper arm    Onychomycosis 2010    Osteoarthritis     Other and unspecified hyperlipidemia     Plantar fasciitis 2009, 2010    - Orthotics    Thoracic or lumbosacral neuritis or radiculitis, unspecified     Tinea 2009, 2010    Visual impairment     readers     Past Surgical History:   Procedure Laterality Date    COLONOSCOPY      COLONOSCOPY N/A 3/8/2021    Procedure: COLONOSCOPY;  Surgeon: Sean Pereyra MD;  Location: [address] [place] [address] ENDOSCOPY    TONSILLECTOMY       Social History     Socioeconomic History    Marital status: Single     Spouse name: Not on file    Number of children: Not on file    Years of education: Not on file    Highest education level: Not on file   Occupational History    Not on file   Tobacco Use    Smoking status: Never    Smokeless tobacco: Never   Vaping Use    Vaping Use: Never used   Substance and Sexual Activity    Alcohol use: Yes     Alcohol/week: 1.0 - 2.0 standard drink of alcohol     Types: 1 - 2 Shots of liquor per week     Comment: 1-2 drinks weekly    Drug use: No    Sexual activity: Not Currently   Other Topics Concern     Service Not Asked    Blood Transfusions Not Asked    Caffeine Concern Yes     Comment: Coffee, 3 cups daily;      Occupational Exposure Not Asked    [address] Hazards Not Asked    Sleep Concern Not Asked    Stress Concern Not Asked    Weight Concern Not Asked    Special Diet Not Asked    Back Care Not Asked Exercise Not Asked    Bike Helmet Not Asked    Seat Belt Not Asked    Self-Exams Not Asked    Grew up on a farm No    History of tanning Yes    Outdoor occupation No    Reaction to local anesthetic No    Pt has a pacemaker No    Pt has a defibrillator No   Social History Narrative    Not on file     Social Determinants of Health     Financial Resource Strain: Not on file   Food Insecurity: Not on file   Transportation Needs: Not on file   Physical Activity: Not on file   Stress: Not on file   Social Connections: Not on file   Housing Stability: Not on file     Family History   Problem Relation Age of Onset    Stroke Mother         CVA; Cause of death    Macular degeneration Mother     Diabetes Father     Prostate Cancer Father 79       There were no vitals filed for this visit. HPI:    Chief Complaint   Patient presents with    Full Skin Exam     Hx of melanoma and dysplastic nevus. LOV 11/2022. Pt presents for full body exam r/t hx of skin ca. Follow-up history of inflamed cystic acne, facial erythema,  For skin exam  History of melanoma in situ, dysplastic nevi melanoma type II 4/8/2022      Patient post recent biopsy melanoma in situ post wide excision left upper arm. Family history of skin cancer, patient for follow-up. Concern with fungal changes on feet  Past notes/ records and appropriate/relevant lab results including pathology and past body maps reviewed. Including outside notes/ PCP notes as appropriate. Updated and new information noted in current visit. Patient presents with concerns above. Patient has been in their usual state of health. History, medications, allergies reviewed as noted. ROS:  new relevant systemic complaints as noted       Physical Examination:     Well-developed well-nourished patient alert oriented in no acute distress.   Exam total-body performed, including scalp, head, neck, face,nails, hair, external eyes, including conjunctival mucosa, eyelids, lips external ears, back, chest,/ breasts, axillae,  abdomen, arms, legs, palms. Multiple light to medium brown, well marginated, uniformly pigmented, macules and papules 6 mm and less scattered on exam. pigmented lesions examined with dermoscopy benign-appearing patterns. Waxy tannish keratotic papules scattered, cherry-red vascular papules scattered. See map today's date for lesions noted . Otherwise remarkable for lesions as noted on map. See details of examination  See Assessment /Plan for additional history and physical exam also:    Assessment / plan:    No orders of the defined types were placed in this encounter. Meds & Refills for this Visit:  Requested Prescriptions      No prescriptions requested or ordered in this encounter         Encounter Diagnoses   Name Primary? Multiple nevi Yes    Lentigo     SK (seborrheic keratosis)     Atypical nevi     Personal history of malignant melanoma of skin     Benign neoplasm of skin, unspecified location     AK (actinic keratosis)     Rosacea     Dermatitis     Onychomycosis        See details on map. Remarkable for:    History of inflamed acneiform nodules, boils continue clindamycin gel as needed oral antibiotics if worsening to let us know is doing      Continue triamcinolone, Diprolene cream for more severe flare dermatitis. Overall improved. Continue regular monitoring meds in grid. Skin care instructions reviewed. [place] use of emollients. Pathophysiology reviewed. Consider Contac allergy in differential.  Consider patch testing. Patient will let us know how they are doing over the next several weeks. Await clinical response to above therapies. pt with history of MIS left arm post wide excision. 1/22 Doing well. Patient at high risk given family history skin cancer grew up in [place] was in the sun excessively. Lived in [facility] for a number of years. Patient will follow-up every 3 to 4 months for routine skin check. At risk for melanoma and nonmelanoma skin cancers. Mildly atypical compound nevus left lower leg4/22  No recurrence. Monitor carefully    With history melanoma in situ and atypical nevi as well as history of sun damage follow-up every 3 to 4 months. Continue careful follow-up    Overall stable doing well no new suspicious lesions. Actinic Keratoses. Precancerous nature discussed. Sun protection, sunscreen/ blocks encouraged . Monitoring for new lesions. Sun damage additional recurrent and new actinic keratoses, skin cancers may occur in areas of prior actinic keratoses, related to past sun exposure to minimize current sun exposure. Sunscreen applied consistently regularly, reapplication and sun protection while driving recommended. Erythema fine papules telangiectasia of the nose and cheeks continue sun protection MetroGel nightly encouraged more consistent use more erythema noted. .Rosacea. Meds in grid. Skin care instructions reviewed. Pathophysiology reviewed. Chronic recurrent nature discussed. Patient will let us know how they are doing over the next several weeks. Await clinical response to above therapy. More frequent follow-up. Darker brown macule lower back observe carefully. Small nevi over the legs arms back observe carefully consistent with junctional nevi. Observe carefully. Erythema scaling over the plantar feet onychomycotic changes noted. Keratotic changes we will add topical antifungal, Kerydin overall improving continue current regimen stable improving    please refer to map for specific lesions. See additional diagnoses. Pros cons of various therapies, risks benefits discussed. Pathophysiology discussed with patient. Therapeutic options reviewed. See  Medications in grid. Instructions reviewed at length. Benign nevi, seborrheic  keratoses, cherry angiomas:  Reassurance regarding other benign skin lesions. Signs and symptoms of skin cancer, ABCDE's of melanoma discussed with patient. Sunscreen use, sun protection, self exams reviewed. Followup as noted RTC routine checkup 6 mos - one year or p.r.n. Encounter Times Including precharting, reviewing chart, prior notes obtaining history: 10 minutes, medical exam :10 minutes, notes on body map, plan, counseling 10minutes My total time spent caring for the patient on the day of the encounter: 30 minutes     The patient indicates understanding of these issues and agrees to the plan. The patient is asked to return as noted in follow-up/ above. This note was generated using Dragon voice recognition software. Please contact me regarding any confusion resulting from errors in recognition. Note to patient and family: The [facility] makes medical notes like these available to patients. However, be advised this is a medical document. It is intended as [id number] communication and monitoring of a patient's care needs. It is written in medical language and may contain abbreviations or verbiage that are unfamiliar. It may appear blunt or direct. Medical documents are intended to carry relevant information, facts as evident and the clinical opinion of the practitioner.

## 2023-12-04 ENCOUNTER — OFFICE VISIT (OUTPATIENT)
Dept: PHYSICAL THERAPY | Age: 68
End: 2023-12-04
Attending: INTERNAL MEDICINE
Payer: COMMERCIAL

## 2023-12-04 PROCEDURE — 97140 MANUAL THERAPY 1/> REGIONS: CPT

## 2023-12-04 PROCEDURE — 97110 THERAPEUTIC EXERCISES: CPT

## 2023-12-04 PROCEDURE — 97112 NEUROMUSCULAR REEDUCATION: CPT

## 2023-12-04 NOTE — PATIENT INSTRUCTIONS
Access Code: [email or web address]  URL: [email or web address]  Date: 12/04/2023  Prepared by: Mora Small    Exercises  - Soleus Heel Raise in Stride Stance Position  - 2 x daily - 7 x weekly - 2 sets - 10 reps  - Supine Bridge  - 2 x daily - 7 x weekly - 2 sets - 10 reps  - Supine Lower Trunk Rotation  - 2 x daily - 7 x weekly - 2 sets - 10 reps  - Supine Hamstring Stretch  - 2 x daily - 7 x weekly - 1 sets - 5 reps - 20 second hold

## 2023-12-04 NOTE — PROGRESS NOTES
Dx: Bilateral hip pain (M25.551,M25.552)  Bilateral foot pain (M79.671,M79.672)          Insurance (Authorized # of Visits): [employer] [facility] (5 v)           Authorizing Physician: Dr. Caden Loaiza  Next MD visit: not scheduled at this time. Fall Risk: standard         Precautions: n/a             Subjective:  pt reports that his feet are doing better. He sometimes has pain at his L foot. He is leaving for [place] next week and will be walking through the [place]. His pain is going up towards his thighs/hips. He walks daily on campus. He wears orthotics and will see his podiatrist tomorrow. The [facility] helps. He sits on a swiss ball. Getting in and out of the car can be difficulty. He reports hx of back pain. PAS Current = 1-2/10; Worst = 4-5/10  Objective:   Less tenderness TTP L calcaneus  Gait: slightly antalgic, forward shoulders    Strength: 12/4/2023  DF: R 5/5, L 5/5  PF: R 5/5, L 5/5  Inv: R 5/5, L 4 -/5  Ev: R 5/5, L 5/5    Ankle ROM: 12/4/2023  DF: R 5, L 5  1st MTP ext: R 50, L 35*      Assessment: Initial LTR produced pain at L great toe and reduced following lumbar exercises. Addressed decreased joint mobility at L 1st MTP  limiting 1st ray loading with gait. Following tx, pt reported decreased pain and better ease with gait.       Goals:   Goals: (to be met in 8-10 visits)     Pt will demonstrate improved DF AROM to >4 degrees to promote proper foot clearance during gait and greater ease descending stairs without compensation-APPROACHING  Pt will have increased ankle strength to 5/5 throughout for improved ankle control with ADLs such as prolonged gait and stair negotiation-1/2 MET (PF prog)  Pt will have improved SLS to >15s for increased ankle stability with ambulation on uneven surfaces such as gravel and grass-PROG  Pt will report <2/10 pain with walking distances > 200 yards-MET    Pt will demonstrate knee to wall distance within a normal for age and gender (< 13 cm) suggesting improvement in ankle DF mobility-APPROACHING   Pt will be independent and compliant with comprehensive HEP to maintain progress achieved in PT    Plan: alleviate pain; improve ankle ROM/mobility, flexibility, strength/stability, balance, and altered gait and functional mechanics    Date: 10/12/23                 TX#: 4/5 Date: 10/24/23                 TX#: 5/5 Date: 10/26/23   Tx#: 6/10 Date: 11/2/23  Tx#: 7/10 Date: 12/4/2023  Tx#: 8/10   Ther ex:  SL kicks on aeromat (1 UE) 20x  Rhomberg on aeromat with rotation 10x  Rockerboard DF/PF 20x  Modified dead lift R/L 10x  Gastrocs stretch (incline) 3 x 20 cts Ther ex:  ITB stretch belt 3 x 20 cts  Figure 4 stretch 3 x 20 cts  Piriformis stretch 3 x 20 cts  Sdly rectangle 10x  Sdly clam shell with resistance 20x  Side stepping band 4 x 15 steps   Rockerboard DF/PF 20x  Gastrocs stretch 3 x 20 cts Ther ex:  Piliates reformer: Alt heel drop (calf stretch) (all bands) 30x  Heel raises (all bands) 30x  Gastrocs/soleus stretch with toe ext on 1/2 foam roller 5 x 10 cts  Rockerboard DF/PF; M/L 20x  Rockerboard balance 2 x 1 min Ther ex:  Piliates reformer:    Alt heel drop (calf stretch) (all bands) 30x  Heel raises (all bands) 30x  Gastrocs/soleus stretch with toe ext on 1/2 foam roller 5 x 10 cts  Rockerboard DF/PF; M/L 20x   Therapeutic Exercise (15 min)  Supine LTR 10x2  HS stretch with towel support 20\"x5  Standing: supported single leg heel raise 10x2       NMR (20 min)  Gait training; loading through 1st ray; heel to toe pattern  Supine: bridging 10x2  Prolonged ankle INV for post tib activation at L 10\"x8   Manual:  Bilateral plantar fascia release; gastroc/soleus STM Manual: NA Manual:  Bilateral plantar fascia release; gastroc/soleus STM  IASTM HG5 to midfoot, heel and gastroc  Manual:  Bilateral plantar fascia release; gastroc/soleus STM  IASTM HG5 to midfoot, heel and gastroc  Manual: (15 min)  Bilateral plantar fascia release; gastroc/soleus STM  IASTM HG5 to midfoot, heel and gastroc Joint mob at L 1st MTP dorsal and plantar directions          HEP: (review); heel raises (tennis ball); sit<>stand; dead bug; clam shell; sdly rectangle, hip stretching; resisted side stepping     Charges: 1 TE, 1NMR, 1 MT  Total Timed Treatment: 50 min  Total Treatment Time: 50 min

## 2023-12-11 ENCOUNTER — OFFICE VISIT (OUTPATIENT)
Dept: PHYSICAL THERAPY | Age: 68
End: 2023-12-11
Attending: INTERNAL MEDICINE
Payer: COMMERCIAL

## 2023-12-11 PROCEDURE — 97112 NEUROMUSCULAR REEDUCATION: CPT

## 2023-12-11 PROCEDURE — 97140 MANUAL THERAPY 1/> REGIONS: CPT

## 2023-12-11 PROCEDURE — 97110 THERAPEUTIC EXERCISES: CPT

## 2023-12-11 NOTE — PROGRESS NOTES
Dx: Bilateral hip pain (M25.551,M25.552)  Bilateral foot pain (M79.671,M79.672)          Insurance (Authorized # of Visits): [email or web address] [facility] (5 v)           Authorizing Physician: Dr. Juanito Garza  Next MD visit: not scheduled at this time. Fall Risk: standard         Precautions: n/a             Subjective:  pt arrived with new shoes and he reports comfort. Her reports that his hip and feet feel more flexible. PAS Current = 1/10; Worst = 4-5/10  Objective:     Strength: 12/4/2023  DF: R 5/5, L 5/5  PF: R 5/5, L 5/5  Inv: R 5/5, L 4 -/5  Ev: R 5/5, L 5/5    Ankle ROM: 12/11/2023  DF: R 5, L 5  1st MTP ext: R 50, L 45*      Assessment: L 1st MTP ext is steadily improving to ease push off. Pt with report of decreased TTP to palpation at L plantar fascia and gait mechanics observed with heel strike B for foot clearance.       Goals:   Goals: (to be met in 8-10 visits)     Pt will demonstrate improved DF AROM to >4 degrees to promote proper foot clearance during gait and greater ease descending stairs without compensation (met)  Pt will have increased ankle strength to 5/5 throughout for improved ankle control with ADLs such as prolonged gait and stair negotiation-1/2 MET (In progress)  Pt will have improved SLS to >15s for increased ankle stability with ambulation on uneven surfaces such as gravel and grass-PROG  Pt will report <2/10 pain with walking distances > 200 yards-MET    Pt will demonstrate knee to wall distance within a normal for age and gender (< 13 cm) suggesting improvement in ankle DF mobility-APPROACHING   Pt will be independent and compliant with comprehensive HEP to maintain progress achieved in PT (in progress)    Plan: alleviate pain; improve ankle ROM/mobility, flexibility, strength/stability, balance, and altered gait and functional mechanics    Date: 10/12/23                 TX#: 4/5 Date: 10/24/23                 TX#: 5/5 Date: 10/26/23   Tx#: 6/10 Date: 11/2/23  Tx#: 7/10 Date: 12/4/2023  Tx#: 8/10 Date; 12/11/2023  Tx#: 9/10   Ther ex:  SL kicks on aeromat (1 UE) 20x  Rhomberg on aeromat with rotation 10x  Rockerboard DF/PF 20x  Modified dead lift R/L 10x  Gastrocs stretch (incline) 3 x 20 cts Ther ex:  ITB stretch belt 3 x 20 cts  Figure 4 stretch 3 x 20 cts  Piriformis stretch 3 x 20 cts  Sdly rectangle 10x  Sdly clam shell with resistance 20x  Side stepping band 4 x 15 steps   Rockerboard DF/PF 20x  Gastrocs stretch 3 x 20 cts Ther ex:  Piliates reformer: Alt heel drop (calf stretch) (all bands) 30x  Heel raises (all bands) 30x  Gastrocs/soleus stretch with toe ext on 1/2 foam roller 5 x 10 cts  Rockerboard DF/PF; M/L 20x  Rockerboard balance 2 x 1 min Ther ex:  Piliates reformer:    Alt heel drop (calf stretch) (all bands) 30x  Heel raises (all bands) 30x  Gastrocs/soleus stretch with toe ext on 1/2 foam roller 5 x 10 cts  Rockerboard DF/PF; M/L 20x   Therapeutic Exercise (15 min)  Supine LTR 10x2  HS stretch with towel support 20\"x5  Standing: supported single leg heel raise 10x2 Therapeutic Exercise (10 min)    Supine BKFO 20x    LTR 10x2    Supine: hip flex SLR with ankle DF/PF: 10x each        NMR (20 min)  Gait training; loading through 1st ray; heel to toe pattern  Supine: bridging 10x2  Prolonged ankle INV for post tib activation at L 10\"x8 NMR (15 min)  Gait training; loading through 1st ray; heel to toe pattern  Supine: bridging 10x2  Prolonged ankle INV for post tib activation at L 10\"x8   Manual:  Bilateral plantar fascia release; gastroc/soleus STM Manual: NA Manual:  Bilateral plantar fascia release; gastroc/soleus STM  IASTM HG5 to midfoot, heel and gastroc  Manual:  Bilateral plantar fascia release; gastroc/soleus STM  IASTM HG5 to midfoot, heel and gastroc  Manual: (15 min)  Bilateral plantar fascia release; gastroc/soleus STM  IASTM HG5 to midfoot, heel and gastroc   Joint mob at L 1st MTP dorsal and plantar directions Manual: (20 min)  Bilateral plantar fascia release; gastroc/soleus STM  IASTM HG5 to midfoot, heel and gastroc   Joint mob at L 1st MTP dorsal and plantar directions  Joint mob distraction at B TC joints           HEP: (review); heel raises (tennis ball); sit<>stand; dead bug; clam shell; sdly rectangle, hip stretching; resisted side stepping; Bridging    Charges: 1 TE, 1NMR, 1 MT  Total Timed Treatment: 50 min  Total Treatment Time: 50 min

## 2023-12-29 ENCOUNTER — OFFICE VISIT (OUTPATIENT)
Dept: PHYSICAL THERAPY | Age: 68
End: 2023-12-29
Attending: INTERNAL MEDICINE
Payer: COMMERCIAL

## 2023-12-29 PROCEDURE — 97110 THERAPEUTIC EXERCISES: CPT

## 2023-12-29 PROCEDURE — 97140 MANUAL THERAPY 1/> REGIONS: CPT

## 2023-12-29 NOTE — PROGRESS NOTES
Dx: Bilateral hip pain (M25.551,M25.552)  Bilateral foot pain (M79.671,M79.672)          [facility] Visits[facility]           Authorizing Physician: Dr. Christina Rider  Next MD visit: not scheduled at this time. PT Eval: 9/21/2023  Fall Risk: standard         Precautions: n/a              Progress Summary  Pt has attended 10 visits in Physical Therapy. Subjective:  pt arrived with a plantar fascia brace stretch. He did about 12,000 steps daily while on vacation. He did have increased pain at B hips (R more than L). Pt reports that since starting PT, the pain in his feet has signficantly improved. Getting up in the morning, he still has difficulty with moving in bed. PAS Current = 3/10; Worst = 6/10    Objective:     Hip Strength: grossly 4/5 MMT, B    Ankle Strength: 12/29/2023  DF: R 5/5, L 5/5  PF: R 5/5, L 5/5  Inv: R 5/5, L 4 -/5  Ev: R 5/5, L 5/5    Ankle ROM: 12/29/2023  DF: R 5, L 5  1st MTP ext: R 50*, L 50*    Special tests: 12/29/2023  Windlass test: R (-), L (-)  MARU: R (+), L (-)  Scour test: R (+), L (-)      Assessment: Patient has attended 10 PT visits since 9/21/2023 and has responded fairly well. There have been gaps in his care due to traveling and work. Patient reports that he has less foot pain and his B hip pain continues to hurt with transfers, gait and bed mobility. Her currently presents with (-) signs for plantar fasciitis. He presents with (+) signs at R hip for MARU and scour test. Patient demonstrates improvements in ankle/foot ROM and strength, but deficits still remain. He will continue to benefit from PT to progress ROM, strength and gait and balance for improve mobility and ambulation.       Goals: (to be met in 18 visits)    Pt will demonstrate improved DF AROM to >4 degrees to promote proper foot clearance during gait and greater ease descending stairs without compensation (met)  Pt will have increased ankle strength to 5/5 throughout for improved ankle control with ADLs such as prolonged gait and stair negotiation-1/2 MET (In progress)  Pt will have improved SLS to >15s for increased ankle stability with ambulation on uneven surfaces such as gravel and grass-PROG  Pt will report <2/10 pain with walking distances > 200 yards-MET    Pt will demonstrate knee to wall distance within a normal for age and gender (< 13 cm) suggesting improvement in ankle DF mobility-APPROACHING   New Goal: pt will have B hip strength at 5/5 to perform all functional transfers with report of </= 2/10 pain. Pt will be independent and compliant with comprehensive HEP to maintain progress achieved in PT (in progress)      Plan: Continue skilled Physical Therapy 1-2 x/week or a total of 8 visits over a 90 day period. Treatment will include: Patient education; home exercise program; therapeutic exercise; therapeutic activity; manual therapy; neuromuscular re-education; gait and balance training; modalities, prn         Patient was advised of these findings, precautions, and treatment options and has agreed to actively participate in planning and for this course of care. Thank you for your referral. If you have any questions, please contact me at Dept: [phone]. Sincerely,  Electronically signed by therapist: Luda Fine PT     Physician's certification required:  Yes  Please co-sign or sign and return this letter via fax as soon as possible to [phone]. I certify the need for these services furnished under this plan of treatment and while under my care. X___________________________________________________ Date____________________    Certification From: [id number]  To:3/28/2024     Date: 10/26/23   Tx#: 6/10 Date: 11/2/23  Tx#: 7/10 Date: 12/4/2023  Tx#: 8/10 Date; 12/11/2023  Tx#: 9/10 Date: 12/29/2023  Tx#: 10/10   Ther ex:  Piliates reformer:    Alt heel drop (calf stretch) (all bands) 30x  Heel raises (all bands) 30x  Gastrocs/soleus stretch with toe ext on 1/2 foam roller 5 x 10 cts  Rockerboard DF/PF; M/L 20x  Rockerboard balance 2 x 1 min Ther ex:  Piliates reformer:    Alt heel drop (calf stretch) (all bands) 30x  Heel raises (all bands) 30x  Gastrocs/soleus stretch with toe ext on 1/2 foam roller 5 x 10 cts  Rockerboard DF/PF; M/L 20x   Therapeutic Exercise (15 min)  Supine LTR 10x2  HS stretch with towel support 20\"x5  Standing: supported single leg heel raise 10x2 Therapeutic Exercise (10 min)    Supine BKFO 20x    LTR 10x2    Supine: hip flex SLR with ankle DF/PF: 10x each  Therapeutic Exercise (25 min)    Supine; bridging 20x    Sidelying: hip clams 10x2    LTR 10x2    Supine: hip flex SLR with ankle DF/PF: 10x each     Standing: hip abd 10x2    Standing: hip ext 10x2    Standing: Gastroc stretch 20\"x5     Standing: Soleus stretch 20\"x5     NMR (20 min)  Gait training; loading through 1st ray; heel to toe pattern  Supine: bridging 10x2  Prolonged ankle INV for post tib activation at L 10\"x8 NMR (15 min)  Gait training; loading through 1st ray; heel to toe pattern  Supine: bridging 10x2  Prolonged ankle INV for post tib activation at L 10\"x8    Manual:  Bilateral plantar fascia release; gastroc/soleus STM  IASTM HG5 to midfoot, heel and gastroc  Manual:  Bilateral plantar fascia release; gastroc/soleus STM  IASTM HG5 to midfoot, heel and gastroc  Manual: (15 min)  Bilateral plantar fascia release; gastroc/soleus STM  IASTM HG5 to midfoot, heel and gastroc   Joint mob at L 1st MTP dorsal and plantar directions Manual: (20 min)  Bilateral plantar fascia release; gastroc/soleus STM  IASTM HG5 to midfoot, heel and gastroc   Joint mob at L 1st MTP dorsal and plantar directions  Joint mob distraction at B TC joints Manual: (20 min)  Bilateral plantar fascia release; gastroc/soleus STM  IASTM HG5 to midfoot, heel and gastroc   Joint mob at L 1st MTP dorsal and plantar directions  Joint mob distraction at B TC joints          HEP: (review); heel raises (tennis ball); sit<>stand; dead bug; clam shell; sdly rectangle, hip stretching; resisted side stepping; Bridging; gastroc/soleus stretch; standing hip abd    Charges: 2 TE, 1 MT  Total Timed Treatment: 45 min  Total Treatment Time: 45 min

## 2023-12-29 NOTE — PATIENT INSTRUCTIONS
Access Code: [email or web address]  URL: [email or web address]  Date: 12/29/2023  Prepared by: Stacy Small    Exercises  - Gastroc Stretch on Wall  - 2 x daily - 7 x weekly - 1 sets - 5 reps - 20 second hold  - Soleus Stretch on Wall  - 2 x daily - 7 x weekly - 1 sets - 5 reps - 20 second hold  - Standing Hip Abduction with Counter Support  - 2 x daily - 7 x weekly - 2 sets - 10 reps

## 2024-01-18 ENCOUNTER — TELEPHONE (OUTPATIENT)
Dept: INTERNAL MEDICINE CLINIC | Facility: CLINIC | Age: 69
End: 2024-01-18

## 2024-01-18 ENCOUNTER — TELEPHONE (OUTPATIENT)
Dept: CASE MANAGEMENT | Age: 69
End: 2024-01-18

## 2024-01-18 DIAGNOSIS — M25.551 BILATERAL HIP PAIN: ICD-10-CM

## 2024-01-18 DIAGNOSIS — M79.671 BILATERAL FOOT PAIN: ICD-10-CM

## 2024-01-18 DIAGNOSIS — M79.672 BILATERAL FOOT PAIN: ICD-10-CM

## 2024-01-18 DIAGNOSIS — M25.552 BILATERAL HIP PAIN: ICD-10-CM

## 2024-01-18 DIAGNOSIS — M17.9 OSTEOARTHRITIS OF KNEE, UNSPECIFIED LATERALITY, UNSPECIFIED OSTEOARTHRITIS TYPE: Primary | ICD-10-CM

## 2024-01-18 NOTE — TELEPHONE ENCOUNTER
Please send new referral for patient to see Dr. ELISA Hadley in Orthopedics per Insurance requirements.

## 2024-01-18 NOTE — TELEPHONE ENCOUNTER
Dr. Ventura,     Patient called requesting referral to Dr. Hadley for follow up visit.     Pended referral please review diagnosis and sign off if you agree.    Thank you.  Blaire Ugarte  [facility]

## 2024-01-30 RX ORDER — BETAMETHASONE DIPROPIONATE 0.5 MG/G
CREAM TOPICAL
Qty: 50 G | Refills: 1 | Status: SHIPPED | OUTPATIENT
Start: 2024-01-30

## 2024-01-30 NOTE — TELEPHONE ENCOUNTER
Refill Request for medication(s):     Last Office Visit: 10/30/23    Last Refill: 9/29/23    Pharmacy, Dosage verified: yes    Condition Update (if applicable):     Rx pended and sent to provider for approval, please advise. Thank You!

## 2024-01-30 NOTE — TELEPHONE ENCOUNTER
Pt called to provide a condition update and he states he is a  and his neck gets irritated by the collar and the betamethasone helps with this.  Ok to refill?

## 2024-03-11 ENCOUNTER — HOSPITAL ENCOUNTER (OUTPATIENT)
Dept: GENERAL RADIOLOGY | Facility: HOSPITAL | Age: 69
Discharge: HOME | End: 2024-03-11
Attending: ORTHOPAEDIC SURGERY
Payer: COMMERCIAL

## 2024-03-11 ENCOUNTER — OFFICE VISIT (OUTPATIENT)
Dept: PHYSICAL THERAPY | Age: 69
End: 2024-03-11
Attending: INTERNAL MEDICINE
Payer: COMMERCIAL

## 2024-03-11 ENCOUNTER — OFFICE VISIT (OUTPATIENT)
Dept: ORTHOPEDICS CLINIC | Facility: CLINIC | Age: 69
End: 2024-03-11

## 2024-03-11 VITALS — BODY MASS INDEX: 31.2 KG/M2 | HEIGHT: 72 IN | WEIGHT: 230.38 LBS

## 2024-03-11 DIAGNOSIS — M17.12 PRIMARY OSTEOARTHRITIS OF LEFT KNEE: Primary | ICD-10-CM

## 2024-03-11 DIAGNOSIS — M17.12 PRIMARY OSTEOARTHRITIS OF LEFT KNEE: ICD-10-CM

## 2024-03-11 PROCEDURE — 97110 THERAPEUTIC EXERCISES: CPT

## 2024-03-11 PROCEDURE — 73562 X-RAY EXAM OF KNEE 3: CPT | Performed by: ORTHOPAEDIC SURGERY

## 2024-03-11 PROCEDURE — 97140 MANUAL THERAPY 1/> REGIONS: CPT

## 2024-03-11 PROCEDURE — 97112 NEUROMUSCULAR REEDUCATION: CPT

## 2024-03-11 NOTE — PROGRESS NOTES
Dx: Bilateral hip pain (M25.551,M25.552)  Bilateral foot pain (M79.671,M79.672)          Insurance (Authorized # of Visits): 5 visits 1/1/24-3/28/2024          Authorizing Physician: Dr. Lorenzo Phillips MD visit: not scheduled at this time.  PT Eval: 9/21/2023  Fall Risk: standard         Precautions: n/a             Subjective:  pt has bee walking a lot. He still has pain at his L foot/big toe and both of his hips can be painful. The plantar fasciitis is much better when he gets up in the morning. He no longer has tingling/pain when he gets up.     PAS: L great toe: Current = 3-5/10; Foot: R = 0/10, L 0/10    Objective:     Hip Strength: grossly 4/5 MMT, B    Ankle Strength: 12/29/2023  DF: R 5/5, L 5/5  PF: R 5/5, L 5/5  Inv: R 5/5, L 4 -/5  Ev: R 5/5, L 5/5    Ankle ROM: 3/11/2024  DF: R 5, L 5  1st MTP ext: R 30*, L 50    Special tests: 3/11/2024  Windlass test: R (-), L (-)  MARU: R (+), L (-)      Assessment: 1st MTP ext at L is limited due to joint stiffness and pain. Due to pain, gait altered with signs of absent 1st ray loading at L and mild foot slap. Following tx, pt reported decreased pain at L foot with gait and showed ability to push off L 1st ray. He reports that he was still experiencing R hip pain with gait and will benefit from further assessment at next visit.      Goals: (to be met in 18 visits)    Pt will demonstrate improved DF AROM to >4 degrees to promote proper foot clearance during gait and greater ease descending stairs without compensation (met)  Pt will have increased ankle strength to 5/5 throughout for improved ankle control with ADLs such as prolonged gait and stair negotiation-1/2 MET (In progress)  Pt will have improved SLS to >15s for increased ankle stability with ambulation on uneven surfaces such as gravel and grass-PROG  Pt will report <2/10 pain with walking distances > 200 yards-MET    Pt will demonstrate knee to wall distance within a normal for age and gender (< 13 cm)  suggesting improvement in ankle DF mobility-APPROACHING   New Goal: pt will have B hip strength at 5/5 to perform all functional transfers with report of </= 2/10 pain. (In progress)  Pt will be independent and compliant with comprehensive HEP to maintain progress achieved in PT (in progress)  3/11/2024: reviewed goals with pt.      Plan: Assess hips. Progress ROM, strengthening, balance and gait.      Date: 12/4/2023  Tx#: 8/10 Date; 12/11/2023  Tx#: 9/10 Date: 12/29/2023  Tx#: 10/10 Date: 3/11/2024  Tx#: 11 (1 out of 5)   Therapeutic Exercise (15 min)  Supine LTR 10x2  HS stretch with towel support 20\"x5  Standing: supported single leg heel raise 10x2 Therapeutic Exercise (10 min)    Supine BKFO 20x    LTR 10x2    Supine: hip flex SLR with ankle DF/PF: 10x each  Therapeutic Exercise (25 min)    Supine; bridging 20x    Sidelying: hip clams 10x2    LTR 10x2    Supine: hip flex SLR with ankle DF/PF: 10x each     Standing: hip abd 10x2    Standing: hip ext 10x2    Standing: Gastroc stretch 20\"x5     Standing: Soleus stretch 20\"x5 Ther Ex (8 min)    Standing: Gastroc stretch 20\"x5     Standing: Soleus stretch 20\"x5   NMR (20 min)  Gait training; loading through 1st ray; heel to toe pattern  Supine: bridging 10x2  Prolonged ankle INV for post tib activation at L 10\"x8 NMR (15 min)  Gait training; loading through 1st ray; heel to toe pattern  Supine: bridging 10x2  Prolonged ankle INV for post tib activation at L 10\"x8  NMR: (30 min)  Prolong hold FHL by therapist 10\"x10  Prolong hold FHL yellow tband 10\"x5  SLS on foampad - contralateral hip flex/ext 10x2  SLS on foampad - contraliateral hip abd 10x2  Squats hip hinge 10x2  Standing: alt march 10x2   Manual: (15 min)  Bilateral plantar fascia release; gastroc/soleus STM  IASTM HG5 to midfoot, heel and gastroc   Joint mob at L 1st MTP dorsal and plantar directions Manual: (20 min)  Bilateral plantar fascia release; gastroc/soleus STM  IASTM HG5 to midfoot, heel and  gastroc   Joint mob at L 1st MTP dorsal and plantar directions  Joint mob distraction at B TC joints Manual: (20 min)  Bilateral plantar fascia release; gastroc/soleus STM  IASTM HG5 to midfoot, heel and gastroc   Joint mob at L 1st MTP dorsal and plantar directions  Joint mob distraction at B TC joints Manual Therapy (10 min)  MTP joint mobs: distraction; approximation; plantar & dorsal glides grades I-III           HEP: (review); heel raises (tennis ball); sit<>stand; dead bug; clam shell; sdly rectangle, hip stretching; resisted side stepping; Bridging; gastroc/soleus stretch; standing hip abd    Charges: 1 TE, 1 MT, 2 NMR  Total Timed Treatment: 48 min  Total Treatment Time: 48 min

## 2024-03-11 NOTE — PATIENT INSTRUCTIONS
Access Code: [email or web address]  URL: [email or web address]  Date: 03/11/2024  Prepared by: Rossy Small    Exercises  - Great Toe Flexion with Resistance  - 2 x daily - 7 x weekly - 2 sets - 10 reps  - Seated Toe Curl  - 2 x daily - 7 x weekly - 2 sets - 10 reps

## 2024-03-11 NOTE — PROGRESS NOTES
NURSING INTAKE COMMENTS:   Chief Complaint   Patient presents with    Knee Pain     L knee f/u- rates pain 1-10/10 on and off       HPI: This 68 year old male presents today with complaints of mild discomfort in the left knee.  He is not taking any medications currently.  He has not had any injections within the last year.  He wanted to just check with me and see how things are progressing.    Past Medical History:   Diagnosis Date    Colitis     1981    Dysplastic nevus 04/2022    Left lower leg    High cholesterol     History of blood transfusion 1981    no reactions    Melanoma in situ (HCC) 01/2022    Left upper arm    Onychomycosis 2010    Osteoarthritis     Other and unspecified hyperlipidemia     Plantar fasciitis 2009, 2010    - Orthotics    Thoracic or lumbosacral neuritis or radiculitis, unspecified     Tinea 2009, 2010    Visual impairment     readers     Past Surgical History:   Procedure Laterality Date    COLONOSCOPY      COLONOSCOPY N/A 3/8/2021    Procedure: COLONOSCOPY;  Surgeon: Quinn Plascencia MD;  Location: [facility] ENDOSCOPY    TONSILLECTOMY       Current Outpatient Medications   Medication Sig Dispense Refill    Betamethasone Dipropionate Aug 0.05 % External Cream APPLY TO RASH ON NECK TWICE DAILY AS NEEDED 50 g 1    ergocalciferol 1.25 MG (84836 UT) Oral Cap Take 1 capsule (50,000 Units total) by mouth once a week. 12 capsule 1    atorvastatin 10 MG Oral Tab Take 1 tablet (10 mg total) by mouth nightly. 90 tablet 1     Allergies   Allergen Reactions    Penicillins UNKNOWN     Unable to remember reactions     Family History   Problem Relation Age of Onset    Stroke Mother         CVA; Cause of death    Macular degeneration Mother     Diabetes Father     Prostate Cancer Father 67       Social History     Occupational History    Not on file   Tobacco Use    Smoking status: Never    Smokeless tobacco: Never   Vaping Use    Vaping Use: Never used   Substance and Sexual Activity    Alcohol use: Yes      Alcohol/week: 1.0 - 2.0 standard drink of alcohol     Types: 1 - 2 Shots of liquor per week     Comment: 1-2 drinks weekly    Drug use: No    Sexual activity: Not Currently        Review of Systems:  GENERAL: feels generally well, no significant weight loss or weight gain  SKIN: no ulcerated or worrisome skin lesions  EYES:denies blurred vision or double vision  HEENT: denies new nasal congestion, sinus pain or ST  LUNGS: denies shortness of breath  CARDIOVASCULAR: denies chest pain  GI: no hematemesis, no worsening heartburn, no diarrhea  : no dysuria, no blood in urine, no difficulty urinating, no incontinence  MUSCULOSKELETAL: no other musculoskeletal complaints other than in HPI  NEURO: no numbness or tingling, no weakness or balance disorder  PSYCHE: no depression or anxiety  HEMATOLOGIC: no hx of blood dyscrasia  ENDOCRINE: no thyroid or diabetes issues  ALL/ASTHMA: no new hx of severe allergy or asthma    Physical Examination:    Ht 6' (1.829 m)   Wt 230 lb 6.4 oz (104.5 kg)   BMI 31.25 kg/m²   Constitutional: appears well hydrated, alert and responsive, no acute distress noted  Extremities: No effusion in the left knee.  Full extension 120 degrees of flexion.  No instability.  Mild varus deformity.      Imaging: Moderately advanced osteoarthritis left knee with 90% loss of medial joint space..     Lab Results   Component Value Date    WBC 5.5 07/14/2023    HGB 14.6 07/14/2023    .0 07/14/2023      Lab Results   Component Value Date     (H) 07/14/2023    BUN 17 07/14/2023    CREATSERUM 0.94 07/14/2023    GFRNAA 82 12/09/2021    GFRAA 95 12/09/2021        Assessment and Plan:  Diagnoses and all orders for this visit:    Primary osteoarthritis of left knee  -     XR KNEE (3 VIEWS), LEFT (CPT=73562); Future        Assessment: His osteoarthritis has not progressed radiographically or clinically since last year.    Plan: We reviewed the importance of weight reduction, and talked about  nonsurgical and surgical treatment options.  He elected no treatment at this time other than diet and exercise and will follow-up as needed if his symptoms increase.    The above note was creating using Dragon speech recognition technology. Please excuse any typos.    ARI BHANDARI MD

## 2024-04-01 ENCOUNTER — OFFICE VISIT (OUTPATIENT)
Dept: PHYSICAL THERAPY | Age: 69
End: 2024-04-01
Attending: INTERNAL MEDICINE
Payer: COMMERCIAL

## 2024-04-01 PROCEDURE — 97110 THERAPEUTIC EXERCISES: CPT

## 2024-04-01 PROCEDURE — 97140 MANUAL THERAPY 1/> REGIONS: CPT

## 2024-04-01 PROCEDURE — 97112 NEUROMUSCULAR REEDUCATION: CPT

## 2024-04-01 NOTE — PROGRESS NOTES
Dx: Bilateral hip pain (M25.551,M25.552)  Bilateral foot pain (M79.671,M79.672)          Insurance (Authorized # of Visits): 5 visits 1/1/24-5/31/2024          Authorizing Physician: Dr. Lorenzo Phillips MD visit: not scheduled at this time.  PT Eval: 9/21/2023  Fall Risk: standard         Precautions: n/a             Subjective:  pt reports that about 1.5 weeks ago, he has severe pain at his L big toe, so he eased on his exercises. He feels that he is not walking straight.He continues to have R hip pain with a sense of collapsing when he is walking. He saw Dr. Hadley for his knees and will not need surgery at this time; however, he did not discuss his R hip with Dr. Hadley.   PAS: L great toe: Current = 2-3/10; Foot: R = 0/10, L 0/10    Objective:     Hip Strength: 4/1/2024 grossly 4/5 MMT at R, 5/5 at L    Hip PROM: 4/1/2024: remarkable findings = R hip ER and ext is grossly limited compared to L    Ankle/foot Strength: 4/1/2024  DF: R 5/5, L 5/5  PF: R 5/5, L 5/5  Inv: R 5/5, L 5/5  Ev: R 5/5, L 5/5  EHL: R 5/5, L 5/5  FHL: R 5/5, L 4 -/5* (pain L great toe)  FDL: R 5/5, L 4/5    Ankle ROM: 4/1/2024  DF: R 5, L 5  1st MTP ext: R 30*, L 50    Special tests: 4/1/2024  Windlass test: R (-), L (-)  MARU: R (-), L (-)    Balance 4/1/2024: SLS on foampad x 15\" bilaterally    Assessment: 1st MTP ext at L continues to be limited due to joint stiffness and pain limited full WBing with push off during gait. He was advise to consult ortho or podiatry for further medical evaluation. R hip strength is decreased at 4/4 MMT strength and decreased hip ER and ext compared to R. Tx focused on activating R psoas during hip flexion and cueing provided to reduce TFL compensation that appears to contribute to pain with flexion with signs of medial rotation that contributes to c/o impingement symptoms. With facilitation of R psoas, pt able to flex without pain.      Goals: (to be met in 18 visits)    Pt will demonstrate improved DF  AROM to >4 degrees to promote proper foot clearance during gait and greater ease descending stairs without compensation (met)  Pt will have increased ankle strength to 5/5 throughout for improved ankle control with ADLs such as prolonged gait and stair negotiation- (Met for ankle; great toe strength at L is 4 -/5 to 4/5)  Pt will have improved SLS to >15s for increased ankle stability with ambulation on uneven surfaces such as gravel and grass (Met)  Pt will report <2/10 pain with walking distances > 200 yards (In progress - pt has c/o L great toe pain)  Pt will demonstrate knee to wall distance within a normal for age and gender (< 13 cm) suggesting improvement in ankle DF mobility-APPROACHING (4/1/2024 = not tested)  pt will have B hip strength at 5/5 to perform all functional transfers with report of </= 2/10 pain. (In progress)  Pt will be independent and compliant with comprehensive HEP to maintain progress achieved in PT (in progress)  4/1/2024: reviewed goals with pt.      Plan: Progress ROM, strengthening, balance and gait.      Date: 12/4/2023  Tx#: 8/10 Date; 12/11/2023  Tx#: 9/10 Date: 12/29/2023  Tx#: 10/10 Date: 3/11/2024  Tx#: 11 (1 out of 5) Date: 4/1/2024  Tx#: 12 ( 2 out of 5)   Therapeutic Exercise (15 min)  Supine LTR 10x2  HS stretch with towel support 20\"x5  Standing: supported single leg heel raise 10x2 Therapeutic Exercise (10 min)    Supine BKFO 20x    LTR 10x2    Supine: hip flex SLR with ankle DF/PF: 10x each  Therapeutic Exercise (25 min)    Supine; bridging 20x    Sidelying: hip clams 10x2    LTR 10x2    Supine: hip flex SLR with ankle DF/PF: 10x each     Standing: hip abd 10x2    Standing: hip ext 10x2    Standing: Gastroc stretch 20\"x5     Standing: Soleus stretch 20\"x5 Ther Ex (8 min)    Standing: Gastroc stretch 20\"x5     Standing: Soleus stretch 20\"x5 Ther Ex (8 min)    Bridging 20x    Supine: hip SLR with hip ER 10x2    Standing: Gastroc stretch 20\"x5      NMR (20 min)  Gait  training; loading through 1st ray; heel to toe pattern  Supine: bridging 10x2  Prolonged ankle INV for post tib activation at L 10\"x8 NMR (15 min)  Gait training; loading through 1st ray; heel to toe pattern  Supine: bridging 10x2  Prolonged ankle INV for post tib activation at L 10\"x8  NMR: (30 min)  Prolong hold FHL by therapist 10\"x10  Prolong hold FHL yellow tband 10\"x5  SLS on foampad - contralateral hip flex/ext 10x2  SLS on foampad - contraliateral hip abd 10x2  Squats hip hinge 10x2  Standing: alt march 10x2 NMR: (30 min)  Prolong hold FHL by therapist 10\"x10  Sitting; psoas hip flexion lift 10x2, 5\" hold  Toe curls towel scrunches   [place]  toe flexion  SLS balance on foampad 10x10\"  Standing: alt march 10x2   Manual: (15 min)  Bilateral plantar fascia release; gastroc/soleus STM  IASTM HG5 to midfoot, heel and gastroc   Joint mob at L 1st MTP dorsal and plantar directions Manual: (20 min)  Bilateral plantar fascia release; gastroc/soleus STM  IASTM HG5 to midfoot, heel and gastroc   Joint mob at L 1st MTP dorsal and plantar directions  Joint mob distraction at B TC joints Manual: (20 min)  Bilateral plantar fascia release; gastroc/soleus STM  IASTM HG5 to midfoot, heel and gastroc   Joint mob at L 1st MTP dorsal and plantar directions  Joint mob distraction at B TC joints Manual Therapy (10 min)  MTP joint mobs: distraction; approximation; plantar & dorsal glides grades I-III   Manual Therapy (10 min)  MTP joint mobs: distraction; approximation; plantar & dorsal glides grades I-III          HEP: (review); heel raises (tennis ball); sit<>stand; dead bug; clam shell; sdly rectangle, hip stretching; resisted side stepping; Bridging; gastroc/soleus stretch; standing hip abd; towel toe curls    Charges: 1 TE, 1 MT, 2 NMR  Total Timed Treatment: 48 min  Total Treatment Time: 48 min

## 2024-04-15 ENCOUNTER — OFFICE VISIT (OUTPATIENT)
Dept: PHYSICAL THERAPY | Age: 69
End: 2024-04-15
Attending: INTERNAL MEDICINE
Payer: COMMERCIAL

## 2024-04-15 PROCEDURE — 97140 MANUAL THERAPY 1/> REGIONS: CPT

## 2024-04-15 PROCEDURE — 97112 NEUROMUSCULAR REEDUCATION: CPT

## 2024-04-15 NOTE — PROGRESS NOTES
Dx: Bilateral hip pain (M25.551,M25.552)  Bilateral foot pain (M79.671,M79.672)          Insurance (Authorized # of Visits): 5 visits 1/1/24-5/31/2024          Authorizing Physician: Dr. Lorenzo Phillips MD visit: not scheduled at this time.  PT Eval: 9/21/2023  Fall Risk: standard         Precautions: n/a             Subjective:  pt reports that he walked a lot over the weekend in [facility]. His will start to wear his L knee brace. His R hip pain is consistent. Bringing knee to chest helps his hip pain. He is having less intense pain in his L toe.  PAS: L great toe: Current = 1-2/10; Foot: R = 0/10, L 0/10    Objective:     Hip Strength: 4/1/2024 grossly 4/5 MMT at R, 5/5 at L    Hip PROM: 4/1/2024: remarkable findings = R hip ER and ext is grossly limited compared to L    Ankle/foot Strength: 4/1/2024  DF: R 5/5, L 5/5  PF: R 5/5, L 5/5  Inv: R 5/5, L 5/5  Ev: R 5/5, L 5/5  EHL: R 5/5, L 5/5  FHL: R 5/5, L 4 -/5* (pain L great toe)  FDL: R 5/5, L 4/5    Ankle ROM: 4/1/2024  DF: R 5, L 5  1st MTP ext: R 30*, L 50    Special tests: 4/1/2024  Windlass test: R (-), L (-)  MARU: R (-), L (-)    Balance 4/1/2024: SLS on foampad x 15\" bilaterally      Assessment: Pt presented with mild irritability at L 1st MTP allow for increased ease with push off at 1st ray.  Progressed dynamic balance with lunges in multiple planes wit fair + balance and motor control.       Goals: (to be met in 18 visits)  Pt will demonstrate improved DF AROM to >4 degrees to promote proper foot clearance during gait and greater ease descending stairs without compensation (met)  Pt will have increased ankle strength to 5/5 throughout for improved ankle control with ADLs such as prolonged gait and stair negotiation- (Met for ankle; great toe strength at L is 4 -/5 to 4/5)  Pt will have improved SLS to >15s for increased ankle stability with ambulation on uneven surfaces such as gravel and grass (Met)  Pt will report <2/10 pain with walking  distances > 200 yards (In progress - pt has c/o L great toe pain)  Pt will demonstrate knee to wall distance within a normal for age and gender (< 13 cm) suggesting improvement in ankle DF mobility-APPROACHING (4/1/2024 = not tested)  pt will have B hip strength at 5/5 to perform all functional transfers with report of </= 2/10 pain. (In progress)  Pt will be independent and compliant with comprehensive HEP to maintain progress achieved in PT (in progress)  4/1/2024: reviewed goals with pt.      Plan: Progress ROM, strengthening, balance and gait.      Date: 12/29/2023  Tx#: 10/10 Date: 3/11/2024  Tx#: 11 (1 out of 5) Date: 4/1/2024  Tx#: 12 ( 2 out of 5) Date: 4/15/2024  Tx#: 13 (3 out of 5)   Therapeutic Exercise (25 min)    Supine; bridging 20x    Sidelying: hip clams 10x2    LTR 10x2    Supine: hip flex SLR with ankle DF/PF: 10x each     Standing: hip abd 10x2    Standing: hip ext 10x2    Standing: Gastroc stretch 20\"x5     Standing: Soleus stretch 20\"x5 Ther Ex (8 min)    Standing: Gastroc stretch 20\"x5     Standing: Soleus stretch 20\"x5 Ther Ex (8 min)    Bridging 20x    Supine: hip SLR with hip ER 10x2    Standing: Gastroc stretch 20\"x5    Ther Ex (5 min)    Supine: modified Ramon hip flexor stretch x 1 min, B    Supine; BKFO stretch 30\"x2    NMR: (30 min)  Prolong hold FHL by therapist 10\"x10  Prolong hold FHL yellow tband 10\"x5  SLS on foampad - contralateral hip flex/ext 10x2  SLS on foampad - contraliateral hip abd 10x2  Squats hip hinge 10x2  Standing: alt march 10x2 NMR: (30 min)  Prolong hold FHL by therapist 10\"x10  Sitting; psoas hip flexion lift 10x2, 5\" hold  Toe curls towel scrunches   [place]  toe flexion  SLS balance on foampad 10x10\"  Standing: alt march 10x2 NMR: (30 min)  Prolong hold FHL by therapist 10\"x10  Sitting; psoas hip flexion lift 10x2, 5\" hold  Toe curls towel scrunches   [place]  toe flexion  SLS balance on dynandisc 10x 5-10\"  Mini Lateral lunges 10x2  Lateral  shiting M/L  Mini forward lunges 10x2   Manual: (20 min)  Bilateral plantar fascia release; gastroc/soleus STM  IASTM HG5 to midfoot, heel and gastroc   Joint mob at L 1st MTP dorsal and plantar directions  Joint mob distraction at B TC joints Manual Therapy (10 min)  MTP joint mobs: distraction; approximation; plantar & dorsal glides grades I-III   Manual Therapy (10 min)  MTP joint mobs: distraction; approximation; plantar & dorsal glides grades I-III Manual Therapy (10 min)  At L foot: MTP and intertarsal joint mobs: distraction; approximation; plantar & dorsal glides grades I-III  TC joint distraction mob at L foot         HEP: (review); heel raises (tennis ball); sit<>stand; dead bug; clam shell; sdly rectangle, hip stretching; resisted side stepping; Bridging; gastroc/soleus stretch; standing hip abd; towel toe ; mini lunges fwd/lateral    Charges: 1 MT, 2 NMR   Total Timed Treatment: 45 min  Total Treatment Time: 45 min

## 2024-04-22 ENCOUNTER — APPOINTMENT (OUTPATIENT)
Dept: PHYSICAL THERAPY | Age: 69
End: 2024-04-22
Attending: INTERNAL MEDICINE
Payer: COMMERCIAL

## 2024-04-29 ENCOUNTER — OFFICE VISIT (OUTPATIENT)
Dept: DERMATOLOGY CLINIC | Facility: CLINIC | Age: 69
End: 2024-04-29
Payer: COMMERCIAL

## 2024-04-29 ENCOUNTER — APPOINTMENT (OUTPATIENT)
Dept: PHYSICAL THERAPY | Age: 69
End: 2024-04-29
Attending: INTERNAL MEDICINE
Payer: COMMERCIAL

## 2024-04-29 DIAGNOSIS — L57.0 AK (ACTINIC KERATOSIS): ICD-10-CM

## 2024-04-29 DIAGNOSIS — L82.1 SK (SEBORRHEIC KERATOSIS): ICD-10-CM

## 2024-04-29 DIAGNOSIS — D22.9 MULTIPLE NEVI: ICD-10-CM

## 2024-04-29 DIAGNOSIS — B35.9 TINEA: ICD-10-CM

## 2024-04-29 DIAGNOSIS — Z85.820 ENCOUNTER FOR FOLLOW-UP SURVEILLANCE OF MELANOMA: ICD-10-CM

## 2024-04-29 DIAGNOSIS — B35.1 ONYCHOMYCOSIS: ICD-10-CM

## 2024-04-29 DIAGNOSIS — Z13.89 ENCOUNTER FOR SURVEILLANCE OF ABNORMAL NEVI: ICD-10-CM

## 2024-04-29 DIAGNOSIS — D22.9 ATYPICAL NEVI: ICD-10-CM

## 2024-04-29 DIAGNOSIS — D23.9 BENIGN NEOPLASM OF SKIN, UNSPECIFIED LOCATION: ICD-10-CM

## 2024-04-29 DIAGNOSIS — L30.9 DERMATITIS: ICD-10-CM

## 2024-04-29 DIAGNOSIS — L98.9 PAINFUL SKIN LESION: Primary | ICD-10-CM

## 2024-04-29 DIAGNOSIS — Z08 ENCOUNTER FOR FOLLOW-UP SURVEILLANCE OF MELANOMA: ICD-10-CM

## 2024-04-29 DIAGNOSIS — L81.4 LENTIGO: ICD-10-CM

## 2024-04-29 DIAGNOSIS — L71.9 ROSACEA: ICD-10-CM

## 2024-04-29 PROCEDURE — 99214 OFFICE O/P EST MOD 30 MIN: CPT | Performed by: DERMATOLOGY

## 2024-04-29 PROCEDURE — G2211 COMPLEX E/M VISIT ADD ON: HCPCS | Performed by: DERMATOLOGY

## 2024-04-29 RX ORDER — KETOCONAZOLE 20 MG/G
CREAM TOPICAL
Qty: 60 G | Refills: 3 | Status: SHIPPED | OUTPATIENT
Start: 2024-04-29

## 2024-05-06 ENCOUNTER — OFFICE VISIT (OUTPATIENT)
Dept: PHYSICAL THERAPY | Age: 69
End: 2024-05-06
Attending: INTERNAL MEDICINE
Payer: COMMERCIAL

## 2024-05-06 PROCEDURE — 97112 NEUROMUSCULAR REEDUCATION: CPT

## 2024-05-06 PROCEDURE — 97110 THERAPEUTIC EXERCISES: CPT

## 2024-05-06 PROCEDURE — 97140 MANUAL THERAPY 1/> REGIONS: CPT

## 2024-05-06 NOTE — PROGRESS NOTES
Dx: Bilateral hip pain (M25.551,M25.552)  Bilateral foot pain (M79.671,M79.672)          Insurance (Authorized # of Visits): 5 visits 1/1/24-5/31/2024          Authorizing Physician: Dr. Lorenzo Phillips MD visit: not scheduled at this time.  PT Eval: 9/21/2023  Fall Risk: standard         Precautions: n/a             Subjective:  pt reports that his foot/toes are feeling better. He is able to do his exercises more with less pain. He still has c/o R intermittent hip pain but hasn't been able to schedule an appt with an ortho specialist. His main c/o is popping at his R hip. Today he has pain at the bottom of his L foot. He is doing more of his R hip exercises which helps.  PAS: L great toe: Current = 1-2/10; Foot: R = 0/10, L 3/10    Objective:     Hip Strength: 5/6/2024 grossly 5/5 MMT    Hip PROM: 5/6/2024 remarkable findings = R hip ER and ext is grossly limited compared to L    Ankle/foot Strength: 5/6/2024  DF: R 5/5, L 5/5  PF: R 5/5, L 5/5  Inv: R 5/5, L 5/5  Ev: R 5/5, L 5/5  EHL: R 5/5, L 5/5  FHL: R 5/5, L 4/5* (pain L great toe)  FDL: R 5/5, L 5/5    Ankle ROM: 5/6/2024  DF: R 10, L 10  1st MTP ext: R 45*, L 45    Special tests: 5/6/2024  Windlass test: R (-), L (-)  Hip MAUR: R (-), L (-)  Hip FADIR: R (-), L (-)    Palpation: 5/6/2024: no TTP at B hip region; TTP at L foot, 1st MTP joint    Balance 4/1/2024: SLS on foampad x 15\" bilaterally      Assessment: B hip is strong MMT and pt reported initial R hip popping with WBing exercises. Popping ceased with posture and  correction. B ankle DF improved to 10 deg to allow for better ease with foot clearance; however, 1st MTP ext is at 45 deg, B, which limits 1st ray mobility with gait. Pt advised to schedule an appt with ortho specialist. Discussed possible discharge after 1 more PT visit should his status not progress.      Goals: (to be met in 18 visits)  Pt will demonstrate improved DF AROM to >4 degrees to promote proper foot clearance during  gait and greater ease descending stairs without compensation (met)  Pt will have increased ankle strength to 5/5 throughout for improved ankle control with ADLs such as prolonged gait and stair negotiation- (Met for ankle; great toe strength at L is 4 -/5 to 4/5)  Pt will have improved SLS to >15s for increased ankle stability with ambulation on uneven surfaces such as gravel and grass (Met)  Pt will report <2/10 pain with walking distances > 200 yards (In progress - pt has c/o L great toe pain)  Pt will demonstrate knee to wall distance within a normal for age and gender (< 13 cm) suggesting improvement in ankle DF mobility-APPROACHING (4/1/2024 = not tested)  pt will have B hip strength at 5/5 to perform all functional transfers with report of </= 2/10 pain. (In progress)  Pt will be independent and compliant with comprehensive HEP to maintain progress achieved in PT (in progress)  5/6/2024: reviewed goals with pt.      Plan: Progress ROM, strengthening, balance and gait.      Date: 4/1/2024  Tx#: 12 ( 2 out of 5) Date: 4/15/2024  Tx#: 13 (3 out of 5) Date: 5/6/2024  Tx#L 14 (4 out of 5)   Ther Ex (8 min)    Bridging 20x    Supine: hip SLR with hip ER 10x2    Standing: Gastroc stretch 20\"x5    Ther Ex (5 min)    Supine: modified Ramon hip flexor stretch x 1 min, B    Supine; BKFO stretch 30\"x2 Ther Ex (10 min)    Supine: fig 4 piriformis stretch 20\"x3  Supine: modified Ramon hip flexor stretch 3x 30\", B    Supine; BKFO stretch 30\"x2    Standing: lumbar ext 10x1     NMR: (30 min)  Prolong hold FHL by therapist 10\"x10  Sitting; psoas hip flexion lift 10x2, 5\" hold  Toe curls towel scrunches   [place]  toe flexion  SLS balance on foampad 10x10\"  Standing: alt march 10x2 NMR: (30 min)  Prolong hold FHL by therapist 10\"x10  Sitting; psoas hip flexion lift 10x2, 5\" hold  Toe curls towel scrunches   [place]  toe flexion  SLS balance on dynandisc 10x 5-10\"  Mini Lateral lunges 10x2  Lateral shiting M/L  Mini  forward lunges 10x2 NMR (20 min)  Fwd lunge 10x2  Mini lateral lunge 10x2  Diagonal lunges 10x2  Standing: psoas hip flexion lift 10x5\"   Manual Therapy (10 min)  MTP joint mobs: distraction; approximation; plantar & dorsal glides grades I-III Manual Therapy (10 min)  At L foot: MTP and intertarsal joint mobs: distraction; approximation; plantar & dorsal glides grades I-III  TC joint distraction mob at L foot Manual Therapy (15 min)  At L foot: MTP and intertarsal joint mobs: distraction; approximation; plantar & dorsal glides grades I-III  TC joint distraction mob at L foot  Stm at B plantar fasciitis        HEP: (review); heel raises (tennis ball); sit<>stand; dead bug; clam shell; sdly rectangle, hip stretching; resisted side stepping; Bridging; gastroc/soleus stretch; standing hip abd; towel toe ; mini lunges fwd/lateral    Charges: 1 MT, 1 NMR, 1 TE   Total Timed Treatment: 45 min  Total Treatment Time: 45 min

## 2024-05-09 ENCOUNTER — OFFICE VISIT (OUTPATIENT)
Dept: INTERNAL MEDICINE CLINIC | Facility: CLINIC | Age: 69
End: 2024-05-09
Payer: COMMERCIAL

## 2024-05-09 VITALS
OXYGEN SATURATION: 96 % | WEIGHT: 229.19 LBS | SYSTOLIC BLOOD PRESSURE: 134 MMHG | BODY MASS INDEX: 31.04 KG/M2 | DIASTOLIC BLOOD PRESSURE: 72 MMHG | HEIGHT: 72 IN | HEART RATE: 88 BPM

## 2024-05-09 DIAGNOSIS — Z00.00 ANNUAL PHYSICAL EXAM: ICD-10-CM

## 2024-05-09 DIAGNOSIS — E66.9 OBESITY, CLASS I, BMI 30-34.9: ICD-10-CM

## 2024-05-09 DIAGNOSIS — M25.552 BILATERAL HIP PAIN: Primary | ICD-10-CM

## 2024-05-09 DIAGNOSIS — Z12.5 SCREENING PSA (PROSTATE SPECIFIC ANTIGEN): ICD-10-CM

## 2024-05-09 DIAGNOSIS — M17.12 OSTEOARTHRITIS OF LEFT KNEE, UNSPECIFIED OSTEOARTHRITIS TYPE: ICD-10-CM

## 2024-05-09 DIAGNOSIS — E55.9 VITAMIN D DEFICIENCY: ICD-10-CM

## 2024-05-09 DIAGNOSIS — M25.551 BILATERAL HIP PAIN: Primary | ICD-10-CM

## 2024-05-09 PROCEDURE — 3075F SYST BP GE 130 - 139MM HG: CPT | Performed by: INTERNAL MEDICINE

## 2024-05-09 PROCEDURE — 3078F DIAST BP <80 MM HG: CPT | Performed by: INTERNAL MEDICINE

## 2024-05-09 PROCEDURE — 3008F BODY MASS INDEX DOCD: CPT | Performed by: INTERNAL MEDICINE

## 2024-05-09 PROCEDURE — 99215 OFFICE O/P EST HI 40 MIN: CPT | Performed by: INTERNAL MEDICINE

## 2024-05-09 RX ORDER — ERGOCALCIFEROL 1.25 MG/1
50000 CAPSULE ORAL WEEKLY
Qty: 12 CAPSULE | Refills: 1 | Status: SHIPPED | OUTPATIENT
Start: 2024-05-09

## 2024-05-09 NOTE — PATIENT INSTRUCTIONS
Please try to work on the following dietary changes:  Goals: Aim for 30-40 grams of protein/ meal  Aim for <100 grams of carbohydrates/day  Eat 4-6 vegetables/day  Avoid skipping meals- eat every 4-5 hours  Aim for 3 meals/day  2. Drink lots of water and cut down on soda/juice consumption if soda/juice drinker  3. Focus on protein: (15-30 grams with each meal) ie. greek yogurt, cottage cheese, string cheese, hard boiled eggs  4. Healthy snacks: always have protein in your snack! peanut butter and apples, hummus and carrots, berries, nuts (1/4 cup), tuna and crackers                 Protein Shakes: Premier protein or Core Power                Protein Bars: Rx Bars, Oatmega, Power Crunch                 S[place]ento balanced breaks (cheese and nuts)- without chocolate  5. Reduce carbohydrates <100 grams which includes sweets as well as rice, pasta, potatoes, bread, corn and instead choose whole grain options or more protein or vegetables (4-6 servings of vegetables per day). Use SOLARBRUSH yessenia for carb counting!  6. Get a good night of sleep  7. Try to decrease stress in life; meditate at least 10 minutes before sleeping! Try it and let me know what works for you, try you51Talkube or apps like Calm and Zivix!     Please download apps:  1. \"My Fitness Pal\" (other option is Lose it)) to help you to monitor daily dietary intake and you will be able to see if you are eating the right amount of calories, protein, carbs                With My Fitness Pal-->When you set-up the yessenia or need to adjust settings:                Goals should include:                 Lose 1.5-2 lbs per week                Activity level: not very active (can't count exercise towards calorie number per day)                   ** Daily INPUT> Look at nutrition section-- \"nutrients\" and it will break down your macros for the day (ie. Protein, carbs, fibers, sugars and fats). Try to stay within these numbers daily     2. \"7 minute workout\" to help with  exercise/activity which takes 7 minutes of your day and that you can do at home!   3. \"Calm\" or \"Headspace\" which helps with mindfulness, meditation, clarity, sleep, and taj to your daily life.   4. Skinnytaste blog for healthy recipe ideas  5. DietROKT for low carb resources     HIGH PROTEIN SNACK IDEAS  -cottage cheese  -plain yogurt  -kefir  -hard-boiled eggs  -natural cheeses  -nuts (measure portion size)   -unsweetened nut butters  -dried edamame   -harjeet seeds soaked in water or almond milk  -soy nuts  -cured meats (monitor for sodium issues)   -hummus with vegetables  -bean dip with vegetables     FRUIT  Low carb fruit options   Raspberries: Half a cup (60 grams) contains 3 grams of carbs.  Blackberries: Half a cup (70 grams) contains 4 grams of carbs.  Strawberries: Half a cup (100 grams) contains 6 grams of carbs.  Blueberries: Half a cup (50 grams) contains 6 grams of carbs.  Plum: One medium-sized (80 grams) contains 6 grams of carbs.     VEGETABLES  Low carb vegetables             Understanding Carbohydrates  Goal <100g  A car needs the right type of fuel to run. And you need the right kind of food to function. To keep your energy level up, your body needs food that has carbohydrates (carbs). But carbs raise blood sugar levels higher and faster than other kinds of food. Your dietitian will work with you to figure out the amount of carbs youneed. Carbs come in 3 types: starches, sugars, and fiber.   Starches  Starches are found in grains, some vegetables, and beans. Grain products include bread, pasta, cereal, and tortillas. Starchy vegetables include potatoes, peas, corn, lima beans, yams, and squash. Kidney beans, gray beans,black beans, garbanzo beans, and lentils also have starches.     Sugars  Sugars are found naturally in many foods. Or they can be added. Foods that contain natural sugar include fruits and fruit juices, dairy products, honey, and molasses. Added sugars are found in most  desserts, processed foods, candy, regular soda, and fruit drinks. These are very helpful to treat low blood sugar (hypoglycemia). They give you sugar quickly. Try to keep at least 15 to 20 grams of these simple sugars with you at all times. Eat or drink these if you start to havesymptoms of low blood sugar.   Fiber  Fiber comes from plant foods. Your body can't digest most fiber. Instead of raising blood sugar levels like other carbs, fiber stops blood sugar from rising too fast. Fiber is found in fruits, vegetables, whole grains, beans,peas, and many nuts.   Carb counting  Keep track of the amount of carbs you eat. This can help you keep the right balance of carbs, physical activity, and medicine. The amount of carbs you need will be different from what other people need. How much you need depends on many things. These include your health, the medicines you take, and how active you are. Your healthcare team will help you figure out the right amount of carbs for you. You may start with 45 to 60 grams of carbs per meal, depending on your case. Carb counting is a system that helps you keep track of thecarbohydrates you eat at each meal.   Carbs come from many foods. These include grains, starchy vegetables, fruit, milk, beans, and snack foods. You can either count carbohydrate grams or carbohydrate servings. When you count carbohydrate servings, 1 carbohydrateserving = 15 grams of carbohydrates.   Here are some examples of foods that have about 15 grams of carbs (1 serving of carbohydrates):   1/2 cup of canned or frozen fruit  A small piece of fresh fruit (4 ounces)  1 slice of bread  1/2 cup of oatmeal  1/3 cup of rice  4 to 6 crackers  1/2 English muffin  1/2 cup of black beans  1/4 of a large baked potato (3 ounces)  2/3 cup of plain fat-free yogurt  1 cup of soup  1/2 cup of casserole  6 chicken nuggets  2-inch-square brownie or cake without frosting  2 small cookies  1/2 cup of ice cream or sherbet  Carb  counting is easier when food labels are available. Look at the label to see how many grams of total carbs per serving the food contains. Then you can figure out how much you should eat. If your food doesn't have a nutrition label, you should be able to get an idea how many carbs there are per servingby using a book or website.   Two very important lines to look at on the label are the serving size and the total carbohydrate amount per serving. Here are some tips for using food labelsto count your carbs:   Check the serving size. The information on the label is based on that serving size. If you eat more than the listed serving size, you may have to double or triple the other information on the label.   Check the total grams of carbs.  Total carbohydrate from the label includes sugar, starch, and fiber. Be sure to use the total carbohydrate number (minus the fiber) and not sugar alone.  Know how many grams of carbs you can have.  Be familiar with the matching portion sizes.  Compare labels. Compare the labels of different products. Look at serving sizes and total carbs to find the products that work best for you.   Don't forget protein and fat. With the focus on carb counting, it might be easy to forget protein and fat in your meals. Don't forget to include sources of protein and healthy fat to balance your meals. Also watch how much salt (sodium) you eat. This is especially true if you have high blood pressure. If you have diabetes, limit the amount of sodium to less than 2,300 mg a day.    It’s also important to be consistent with the amount of carbs and time you eat when taking a fixed dose of diabetes medicine. Work with your healthcare provider or dietitian if you need more help. They can help you keep track of your carbs. They can also help you figure out how many grams of carbs youshould have.

## 2024-05-09 NOTE — PROGRESS NOTES
ANGELA Malhotra is a 68 year old male who is here for  1. Bilateral hip pain    2. Vitamin D deficiency    3. Osteoarthritis of left knee, unspecified osteoarthritis type    4. Obesity, Class I, BMI 30-34.9    5. Annual physical exam    6. Screening PSA (prostate specific antigen)          HPI:   ANGELA Malhotra is a 68 year old male presents to establish care.   Planning to retire next year. Has been working at the [employer] as a /.    Past Medical History:    Colitis    1981    Dysplastic nevus    Left lower leg    High cholesterol    History of blood transfusion    no reactions    Melanoma in situ (HCC)    Left upper arm    Onychomycosis    Osteoarthritis    Other and unspecified hyperlipidemia    Plantar fasciitis    - Orthotics    Thoracic or lumbosacral neuritis or radiculitis, unspecified    Tinea    Visual impairment    readers     Past Surgical History:   Procedure Laterality Date    Colonoscopy      Colonoscopy N/A 3/8/2021    Procedure: COLONOSCOPY;  Surgeon: Quinn Plascencia MD;  Location: [facility] ENDOSCOPY    Tonsillectomy         Current Outpatient Medications:     ergocalciferol 1.25 MG (17817 UT) Oral Cap, Take 1 capsule (50,000 Units total) by mouth once a week., Disp: 12 capsule, Rfl: 1    ketoconazole 2 % External Cream, Apply to affected area 2 times daily.to  feet and thighs, Disp: 60 g, Rfl: 3    Betamethasone Dipropionate Aug 0.05 % External Cream, APPLY TO RASH ON NECK TWICE DAILY AS NEEDED, Disp: 50 g, Rfl: 1    atorvastatin 10 MG Oral Tab, Take 1 tablet (10 mg total) by mouth nightly., Disp: 90 tablet, Rfl: 1    Allergies:  Allergies   Allergen Reactions    Penicillins UNKNOWN     Unable to remember reactions     Social History     Socioeconomic History    Marital status: Single     Spouse name: Not on file    Number of children: Not on file    Years of education: Not on file    Highest education level: Not on file   Occupational History    Not on file   Tobacco Use     Smoking status: Never    Smokeless tobacco: Never   Vaping Use    Vaping status: Never Used   Substance and Sexual Activity    Alcohol use: Yes     Alcohol/week: 1.0 - 2.0 standard drink of alcohol     Types: 1 - 2 Shots of liquor per week     Comment: 1-2 drinks weekly    Drug use: No    Sexual activity: Not Currently   Other Topics Concern     Service Not Asked    Blood Transfusions Not Asked    Caffeine Concern Yes     Comment: Coffee, 3 cups daily;     Occupational Exposure Not Asked    Hobby Hazards Not Asked    Sleep Concern Not Asked    Stress Concern Not Asked    Weight Concern Not Asked    Special Diet Not Asked    Back Care Not Asked    Exercise Not Asked    Bike Helmet Not Asked    Seat Belt Not Asked    Self-Exams Not Asked    Grew up on a farm No    History of tanning Yes    Outdoor occupation No    Reaction to local anesthetic No    Pt has a pacemaker No    Pt has a defibrillator No   Social History Narrative    Not on file     Social Determinants of Health     Financial Resource Strain: Not on file   Food Insecurity: Not on file   Transportation Needs: Not on file   Physical Activity: Not on file   Stress: Not on file   Social Connections: Not on file   Housing Stability: Not on file       REVIEW OF SYSTEMS:     GENERAL HEALTH: No fevers, chills, sweats, fatigue  VISION: No recent vision problems, blurry vision or double vision  HEENT: No decreased hearing ear pain nasal congestion or sore throat  SKIN: denies any unusual skin lesions or rashes  RESPIRATORY: denies shortness of breath, cough, wheezing  CARDIOVASCULAR: denies chest pain on exertion, palpitations, swelling in feet  GI: denies abdominal pain and denies heartburn, nausea or vomiting  : No Pain on urination, change in the color of urine, discharge, urinating frequently  MUS: No back pain, joint pain, muscle pain  NEURO: denies headaches , anxiety, depression    EXAM:     Vitals:    05/09/24 0912   BP: 134/72   Pulse: 88    SpO2: 96%   Weight: 229 lb 3.2 oz (104 kg)   Height: 6' (1.829 m)     GENERAL: well developed, well nourished,in no apparent distress  SKIN: no rashes,no suspicious lesions  HEENT: atraumatic, normocephalic,ears and throat are clear,   NECK: supple,no adenopathy,  LUNGS: clear to auscultation, no wheeze  CARDIO: RRR without murmur  GI: good BS's,no masses or tenderness  EXTREMITIES: no cyanosis, or edema    ASSESSMENT AND PLAN:   1. Vitamin D deficiency  - ergocalciferol 1.25 MG (67799 UT) Oral Cap; Take 1 capsule (50,000 Units total) by mouth once a week.  Dispense: 12 capsule; Refill: 1  - Vitamin D [E]; Future    2. Bilateral hip pain  3. Osteoarthritis of left knee, unspecified osteoarthritis type  - chronic, follows with ortho and gets occasional steroid injections and does PT  Plan  - Physical Therapy Referral - [facility]    4. Obesity, Class I, BMI 30-34.9  --Initial weight 229 lbs, BMI 31.09  -interested in lifestyle changes  Plan  Discussed Kcal goals <1800 Kcal, carbohydrate goal <100 grams  -Exercise goal 1 hour daily  -Stress management 10 minutes meditation before bedtime  -Drink at least 64 oz water daily    5. Annual physical exam  Will do labs for physical  - CBC With Differential With Platelet; Future  - Comp Metabolic Panel (14); Future  - TSH W Reflex To Free T4; Future  - Lipid Panel; Future    6. Screening PSA (prostate specific antigen)  - PSA (Screening) [E]; Future    Time spent on this encounter including face to face and chart review 40 minutes.       The patient indicates understanding of these issues and agrees to the plan.    Return in about 2 months (around 7/9/2024) for Annual Physical.        Sussy Garcia MD  5/9/2024

## 2024-05-12 NOTE — PROGRESS NOTES
ANGELA Malhotra is a 68 year old male.  HPI:     CC:    Chief Complaint   Patient presents with    Full Skin Exam     LOV 10/30/23. Patient with Hx of melanoma, present for FBSE. Denies any concerns at this time.         Allergies:  Penicillins    HISTORY:    Past Medical History:    Colitis    1981    Dysplastic nevus    Left lower leg    High cholesterol    History of blood transfusion    no reactions    Melanoma in situ (HCC)    Left upper arm    Onychomycosis    Osteoarthritis    Other and unspecified hyperlipidemia    Plantar fasciitis    - Orthotics    Thoracic or lumbosacral neuritis or radiculitis, unspecified    Tinea    Visual impairment    readers      Past Surgical History:   Procedure Laterality Date    Colonoscopy      Colonoscopy N/A 3/8/2021    Procedure: COLONOSCOPY;  Surgeon: Quinn Plascencia MD;  Location: [facility] ENDOSCOPY    Tonsillectomy        Family History   Problem Relation Age of Onset    Stroke Mother         CVA; Cause of death    Macular degeneration Mother     Diabetes Father     Prostate Cancer Father 67      Social History     Socioeconomic History    Marital status: Single   Tobacco Use    Smoking status: Never    Smokeless tobacco: Never   Vaping Use    Vaping status: Never Used   Substance and Sexual Activity    Alcohol use: Yes     Alcohol/week: 1.0 - 2.0 standard drink of alcohol     Types: 1 - 2 Shots of liquor per week     Comment: 1-2 drinks weekly    Drug use: No    Sexual activity: Not Currently   Other Topics Concern    Caffeine Concern Yes     Comment: Coffee, 3 cups daily;     Grew up on a farm No    History of tanning Yes    Outdoor occupation No    Reaction to local anesthetic No    Pt has a pacemaker No    Pt has a defibrillator No        Current Outpatient Medications   Medication Sig Dispense Refill    ketoconazole 2 % External Cream Apply to affected area 2 times daily.to  feet and thighs 60 g 3    Betamethasone Dipropionate Aug 0.05 % External Cream APPLY TO  RASH ON NECK TWICE DAILY AS NEEDED 50 g 1    atorvastatin 10 MG Oral Tab Take 1 tablet (10 mg total) by mouth nightly. 90 tablet 1    ergocalciferol 1.25 MG (70240 UT) Oral Cap Take 1 capsule (50,000 Units total) by mouth once a week. 12 capsule 1     Allergies:   Allergies   Allergen Reactions    Penicillins UNKNOWN     Unable to remember reactions       Past Medical History:    Colitis    1981    Dysplastic nevus    Left lower leg    High cholesterol    History of blood transfusion    no reactions    Melanoma in situ (HCC)    Left upper arm    Onychomycosis    Osteoarthritis    Other and unspecified hyperlipidemia    Plantar fasciitis    - Orthotics    Thoracic or lumbosacral neuritis or radiculitis, unspecified    Tinea    Visual impairment    readers     Past Surgical History:   Procedure Laterality Date    Colonoscopy      Colonoscopy N/A 3/8/2021    Procedure: COLONOSCOPY;  Surgeon: Quinn Plascencia MD;  Location: [facility] ENDOSCOPY    Tonsillectomy       Social History     Socioeconomic History    Marital status: Single     Spouse name: Not on file    Number of children: Not on file    Years of education: Not on file    Highest education level: Not on file   Occupational History    Not on file   Tobacco Use    Smoking status: Never    Smokeless tobacco: Never   Vaping Use    Vaping status: Never Used   Substance and Sexual Activity    Alcohol use: Yes     Alcohol/week: 1.0 - 2.0 standard drink of alcohol     Types: 1 - 2 Shots of liquor per week     Comment: 1-2 drinks weekly    Drug use: No    Sexual activity: Not Currently   Other Topics Concern     Service Not Asked    Blood Transfusions Not Asked    Caffeine Concern Yes     Comment: Coffee, 3 cups daily;     Occupational Exposure Not Asked    Hobby Hazards Not Asked    Sleep Concern Not Asked    Stress Concern Not Asked    Weight Concern Not Asked    Special Diet Not Asked    Back Care Not Asked    Exercise Not Asked    Bike Helmet Not Asked     Seat Belt Not Asked    Self-Exams Not Asked    Grew up on a farm No    History of tanning Yes    Outdoor occupation No    Reaction to local anesthetic No    Pt has a pacemaker No    Pt has a defibrillator No   Social History Narrative    Not on file     Social Determinants of Health     Financial Resource Strain: Not on file   Food Insecurity: Not on file   Transportation Needs: Not on file   Physical Activity: Not on file   Stress: Not on file   Social Connections: Not on file   Housing Stability: Not on file     Family History   Problem Relation Age of Onset    Stroke Mother         CVA; Cause of death    Macular degeneration Mother     Diabetes Father     Prostate Cancer Father 67       There were no vitals filed for this visit.    HPI:    Chief Complaint   Patient presents with    Full Skin Exam     LOV 10/30/23. Patient with Hx of melanoma, present for FBSE. Denies any concerns at this time.     Patient for follow-up  For skin exam  History of melanoma in situ, dysplastic nevi melanoma type II 4/8/2022      Patient post recent biopsy melanoma in situ post wide excision left upper arm.    Family history of skin cancer, patient for follow-up.    Past notes/ records and appropriate/relevant lab results including pathology and past body maps reviewed. Including outside notes/ PCP notes as appropriate. Updated and new information noted in current visit.     Patient presents with concerns above.    Patient has been in their usual state of health.      History, medications, allergies reviewed as noted.      ROS:  new relevant systemic complaints as noted       Physical Examination:     Well-developed well-nourished patient alert oriented in no acute distress.  Exam total-body performed, including scalp, head, neck, face,nails, hair, external eyes, including conjunctival mucosa, eyelids, lips external ears, back, chest,/ breasts, axillae,  abdomen, arms, legs, palms.     Multiple light to medium brown, well marginated,  uniformly pigmented, macules and papules 6 mm and less scattered on exam. pigmented lesions examined with dermoscopy benign-appearing patterns.     Waxy tannish keratotic papules scattered, cherry-red vascular papules scattered.    See map today's date for lesions noted .      Otherwise remarkable for lesions as noted on map.  See details of examination  See Assessment /Plan for additional history and physical exam also:    Assessment / plan:    No orders of the defined types were placed in this encounter.      Meds & Refills for this Visit:  Requested Prescriptions     Signed Prescriptions Disp Refills    ketoconazole 2 % External Cream 60 g 3     Sig: Apply to affected area 2 times daily.to  feet and thighs         Encounter Diagnoses   Name Primary?    Painful skin lesion Yes    Multiple nevi     Lentigo     SK (seborrheic keratosis)     Atypical nevi     Encounter for surveillance of abnormal nevi     Encounter for follow-up surveillance of melanoma     Benign neoplasm of skin, unspecified location     AK (actinic keratosis)     Rosacea     Dermatitis     Onychomycosis     Tinea        See details on map.      Remarkable for:    Patient seen for follow-up long-term monitoring, treatment of  Atypical nevi, sun damage AK's, history of melanoma.  High risk patient.  Other skin conditions  Requiring ongoing surveillance, monitoring.  See previous notes.  Plan is to continue monitoring every 4 to 6 months or as needed    History of inflamed acneiform nodules, boils continue clindamycin gel as needed oral antibiotics if worsening to let us know is doing    Dermatitis stable overall doing well continue triamcinolone, Diprolene cream for more severe flare dermatitis.  Overall improved.  Continue regular monitoring meds in grid.  Skin care instructions reviewed.  [place] use of emollients.  Pathophysiology reviewed.  Consider Contac allergy in differential.  Consider patch testing.  Patient will let us know how they are  doing over the next several weeks.  Await clinical response to above therapies.    pt with history of MIS left arm post wide excision. 1/22 Doing well.  Patient at high risk given family history skin cancer grew up in [place] was in the sun excessively.  Lived in [place] for a number of years.  Patient will follow-up every 3 to 4 months for routine skin check.  At risk for melanoma and nonmelanoma skin cancers.    Mildly atypical compound nevus left lower leg4/22  No recurrence.  Monitor carefully    With history melanoma in situ and atypical nevi as well as history of sun damage follow-up every 3 to 4 months.  Continue careful follow-up    Overall stable doing well no new suspicious lesions.    Actinic Keratoses.  Precancerous nature discussed. Sun protection, sunscreen/ blocks encouraged .  Monitoring for new lesions.  Sun damage additional recurrent and new actinic keratoses, skin cancers may occur in areas of prior actinic keratoses, related to past sun exposure to minimize current sun exposure.  Sunscreen applied consistently regularly, reapplication and sun protection while driving recommended.    Erythema fine papules telangiectasia of the nose and cheeks continue sun protection MetroGel nightly encouraged more consistent use more erythema noted.  Has not been using this as frequently as ideal stress at work.  Looking forward to shelter in the next several years.  .Rosacea.  Meds in grid.  Skin care instructions reviewed.  Pathophysiology reviewed.  Chronic recurrent nature discussed.  Patient will let us know how they are doing over the next several weeks.  Await clinical response to above therapy.    More frequent follow-up.  Darker brown macule lower back observe carefully.    Small nevi over the legs arms back observe carefully consistent with junctional nevi.  Observe carefully.    Erythema scaling over the plantar feet onychomycotic changes noted.  Keratotic changes we will add topical antifungal,  Kerydin overall improving continue current regimen stable improving    New areas of tinea on proximal thighs groin.  Ketoconazole twice daily.    Irritated tag-like area reported perianal inflamed intermittently follow-up with Dr. Mcbride for this.    No other susupicious lesions on todays  exam.    please refer to map for specific lesions.  See additional diagnoses.  Pros cons of various therapies, risks benefits discussed.Pathophysiology discussed with patient.  Therapeutic options reviewed.  See  Medications in grid.  Instructions reviewed at length.    Benign nevi, seborrheic  keratoses, cherry angiomas:  Reassurance regarding other benign skin lesions.Signs and symptoms of skin cancer, ABCDE's of melanoma discussed with patient. Sunscreen use, sun protection, self exams reviewed.  Followup as noted RTC routine checkup 6 mos - one year or p.r.n.    Encounter Times Including precharting, reviewing chart, prior notes obtaining history: 10 minutes, medical exam :10 minutes, notes on body map, plan, counseling 10minutes My total time spent caring for the patient on the day of the encounter: 30 minutes     The patient indicates understanding of these issues and agrees to the plan.  The patient is asked to return as noted in follow-up/ above.    This note was generated using Dragon voice recognition software.  Please contact me regarding any confusion resulting from errors in recognition.   Note to patient and family: The 21st Century Cures Act makes medical notes like these available to patients. However, be advised this is a medical document. It is intended as [id number]-pg-[id number] communication and monitoring of a patient's care needs. It is written in medical language and may contain abbreviations or verbiage that are unfamiliar. It may appear blunt or direct. Medical documents are intended to carry relevant information, facts as evident and the clinical opinion of the practitioner.

## 2024-05-20 ENCOUNTER — OFFICE VISIT (OUTPATIENT)
Dept: PHYSICAL THERAPY | Age: 69
End: 2024-05-20
Attending: INTERNAL MEDICINE
Payer: COMMERCIAL

## 2024-05-20 PROCEDURE — 97140 MANUAL THERAPY 1/> REGIONS: CPT

## 2024-05-20 PROCEDURE — 97110 THERAPEUTIC EXERCISES: CPT

## 2024-05-20 NOTE — PATIENT INSTRUCTIONS
Access Code: [email or web address]  URL: [email or web address]  Date: 05/20/2024  Prepared by: Rossy Small    Exercises  - Supine Bridge  - 1 x daily - 7 x weekly - 3 sets - 10 reps  - Bent Knee Fallouts  - 1 x daily - 7 x weekly - 3 sets - 10 reps  - Supine Figure 4 Piriformis Stretch  - 1 x daily - 7 x weekly - 3 sets - 10 reps  - Modified Ramon Stretch  - 1 x daily - 7 x weekly - 3 sets - 10 reps  - Seated Toe Curl  - 1 x daily - 7 x weekly - 3 sets - 10 reps  - Seated Ankle Pumps on Table  - 1 x daily - 7 x weekly - 3 sets - 10 reps  - Standing Hip Abduction with Counter Support  - 1 x daily - 7 x weekly - 3 sets - 10 reps  - Standing Hip Extension with Counter Support  - 1 x daily - 7 x weekly - 3 sets - 10 reps  - Heel Toe Raises with Counter Support  - 1 x daily - 7 x weekly - 3 sets - 10 reps

## 2024-05-20 NOTE — PROGRESS NOTES
Dx: Bilateral hip pain (M25.551,M25.552)  Bilateral foot pain (M79.671,M79.672)          Insurance (Authorized # of Visits): 5 visits 1/1/24-5/31/2024          Authorizing Physician: Dr. Ventura(retired); Dr. Garcia  Next MD visit: not scheduled at this time.  PT Eval: 9/21/2023  Fall Risk: standard         Precautions: n/a             Progress Summary  Pt has attended 15 visits in Physical Therapy.      Subjective:  pt saw a new PCP, Dr. Garcia, since Dr. Ventura retired. He hasn't been doing his stretches as much, but he admits that his plantar fasciitis symptoms have resolved. He admits that his R hip will still pop from time to time. He still has pain in his hips and feet when he walks too long when traveling. He uses the railing with stairs for increased security due to concern if his R hip would pop out. His L big toe still hurts.     PAS:   L great toe: Current = 2-3/10  Foot: Current R = 0/10, L = 3/10  Hip: Current R = 0/10, L = 0/10;  worst R = 8-10; L = 0/10     Objective:     Hip Strength: 5/20/2024 grossly 5/5 MMT    Hip PROM: 5/20/2024  ER: R 40, L 35  IR: R 5, 20  Flex: R 120, L 120  Abd: R 25, L 35   Ext: R  8, L 8    Ankle/foot Strength: 5/20/2024  DF: R 5/5, L 5/5  PF: R 5/5, L 5/5  Inv: R 5/5, L 5/5  Ev: R 5/5, L 5/5  EHL: R 5/5, L 5/5  FHL: R 5/5, L 4/5* (pain L great toe)  FDL: R 5/5, L 5/5    Ankle ROM: 5/20/2024  DF: R 10, L 10  PF: R 55, L   1st MTP ext: R 50, L 40    Special tests: 5/20/2024  Windlass test: R (-), L (-)  Hip MARU: R (+), L (-)  Hip FADIR: R (+), L (-)  Hi Scour test: R (+), L (-)    Palpation: 5/6/2024: no TTP at B hip region; TTP at L 1st MTP joint    Balance 4/1/2024: SLS on foampad x 15\" bilaterally    Supine SLR 5/20/2024: R 60 deg, L 65 deg      Assessment: Jamie has attended 15 PT visits since 9/21/2023 to address B hip pain and B feet plantar fasciitis symptoms. He has responded fairly well with report of no L hip pain and minimal to no c/o plantarfasciitis  symptoms. He reports intermittent pain and popping at R hip and consistent L great toe pain. His LE strength is strong at 5/5, except 4/5 at L great toe flexion with pain. B ankle ROM has improved, but 1 MTP ext is limited affecting gait mechanics. There are also limitations with R hip IR and abd with (+) MARU, FADIR and scour tests. Jamie has been advised to follow up with his ortho and podiatrist specialists due to c/o persistent pain. Since deficits still remain, he may benefit from PT again in the future.       Goals: (to be met in 18 visits)  Pt will demonstrate improved DF AROM to >4 degrees to promote proper foot clearance during gait and greater ease descending stairs without compensation (met)  Pt will have increased ankle strength to 5/5 throughout for improved ankle control with ADLs such as prolonged gait and stair negotiation- (Met for ankle; great toe strength at L is 4/5)  Pt will have improved SLS to >15s for increased ankle stability with ambulation on uneven surfaces such as gravel and grass (Met)  Pt will report <2/10 pain with walking distances > 200 yards (In progress - pt has c/o L great toe pain)  Pt will demonstrate knee to wall distance within a normal for age and gender (< 13 cm) suggesting improvement in ankle DF mobility (Not tested)  pt will have B hip strength at 5/5 to perform all functional transfers with report of </= 2/10 pain. (Met  Pt will be independent and compliant with comprehensive HEP to maintain progress achieved in PT (Partially met)  5/20/2024: reviewed goals with pt.      Plan: Discharge from physical therapy and continue with HEP. Patient advised to follow up with ortho Dr. Hadley and podiatrist Dr. Yates. Patient advised to call PT or referring physician with questions or concerns.       Patient/Family/Caregiver was advised of these findings, precautions, and treatment options and has agreed to actively participate in planning and for this course of care.    Thank  you for your referral. If you have any questions, please contact me at Dept: [phone].    Sincerely,  Electronically signed by therapist: Rossy Small PT     Physician's certification required:  Yes  Please co-sign or sign and return this letter via fax as soon as possible to [phone].   I certify the need for these services furnished under this plan of treatment and while under my care.    X___________________________________________________ Date____________________    Certification From: 5/20/2024  To:8/18/2024         Date: 4/1/2024  Tx#: 12 ( 2 out of 5) Date: 4/15/2024  Tx#: 13 (3 out of 5) Date: 5/6/2024  Tx#L 14 (4 out of 5) Date: 5/20/2024  Tx#: 15: (5 out of 5)   Ther Ex (8 min)    Bridging 20x    Supine: hip SLR with hip ER 10x2    Standing: Gastroc stretch 20\"x5    Ther Ex (5 min)    Supine: modified Ramon hip flexor stretch x 1 min, B    Supine; BKFO stretch 30\"x2 Ther Ex (10 min)    Supine: fig 4 piriformis stretch 20\"x3  Supine: modified Ramon hip flexor stretch 3x 30\", B    Supine; BKFO stretch 30\"x2    Standing: lumbar ext 10x1   Ther ex: (30 min)    Objective measures taken, POC and discharge plan discussed    Supine: fig 4 piriformis stretch 20\"    Supine: modified Ramon hip flexor stretch x 30\", B    Supine; BKFO stretch 10x5\"    Supine: Bridging 10x    Sitting: PROM at L 1st MTP performed by pt    Ankle pumps 10x    Toe curls 10x    Heel/toe raises 10x    Hip ext/abd 10x each     NMR: (30 min)  Prolong hold FHL by therapist 10\"x10  Sitting; psoas hip flexion lift 10x2, 5\" hold  Toe curls towel scrunches   Hardy  toe flexion  SLS balance on foampad 10x10\"  Standing: alt march 10x2 NMR: (30 min)  Prolong hold FHL by therapist 10\"x10  Sitting; psoas hip flexion lift 10x2, 5\" hold  Toe curls towel scrunches   Hardy  toe flexion  SLS balance on dynandisc 10x 5-10\"  Mini Lateral lunges 10x2  Lateral shiting M/L  Mini forward lunges 10x2 NMR (20 min)  Fwd lunge  10x2  Mini lateral lunge 10x2  Diagonal lunges 10x2  Standing: psoas hip flexion lift 10x5\"    Manual Therapy (10 min)  MTP joint mobs: distraction; approximation; plantar & dorsal glides grades I-III Manual Therapy (10 min)  At L foot: MTP and intertarsal joint mobs: distraction; approximation; plantar & dorsal glides grades I-III  TC joint distraction mob at L foot Manual Therapy (15 min)  At L foot: MTP and intertarsal joint mobs: distraction; approximation; plantar & dorsal glides grades I-III  TC joint distraction mob at L foot  Stm at B plantar fasciitis Manual Therapy (15 min)  At L foot: MTP and intertarsal joint mobs: distraction; approximation; plantar & dorsal glides grades I-III  TC joint distraction mob at L foot  Posterior mob at R hip; LAD lateral hip distraction mobilization at R hip         HEP: (review); heel raises (tennis ball); sit<>stand; dead bug; clam shell; sdly rectangle, hip stretching; resisted side stepping; Bridging; gastroc/soleus stretch; standing hip abd; towel toe ; mini lunges fwd/lateral  5/20/2024; see pt instructions for HEP    Charges:  2 TE, 1 MT   Total Timed Treatment: 45 min  Total Treatment Time: 45 min

## 2024-05-27 ENCOUNTER — APPOINTMENT (OUTPATIENT)
Dept: PHYSICAL THERAPY | Age: 69
End: 2024-05-27
Attending: INTERNAL MEDICINE
Payer: COMMERCIAL

## 2024-06-07 DIAGNOSIS — E78.00 HYPERCHOLESTEROLEMIA: ICD-10-CM

## 2024-06-07 NOTE — TELEPHONE ENCOUNTER
Please review.  Protocol failed / Has no protocol.     Requested Prescriptions   Pending Prescriptions Disp Refills    atorvastatin 10 MG Oral Tab 90 tablet 1     Sig: Take 1 tablet (10 mg total) by mouth nightly.       Cholesterol Medication Protocol Failed - 6/7/2024  9:51 AM        Failed - Lipid panel within past 12 months     Lab Results   Component Value Date    CHOLEST 170 12/09/2021    TRIG 112 12/09/2021    HDL 62 (H) 12/09/2021    LDL 86 12/09/2021    VLDL 22 12/09/2021    NONHDLC 108 12/09/2021             Passed - ALT < 80     Lab Results   Component Value Date    ALT 31 07/14/2023             Passed - ALT resulted within past year        Passed - In person appointment or virtual visit in the past 12 mos or appointment in next 3 mos     Recent Outpatient Visits              2 weeks ago     [facility] Rossy Small, PT    Office Visit    4 weeks ago Bilateral hip pain    [facility] Sussy Garcia MD    Office Visit    1 month ago     [facility] Rossy Small PT    Office Visit    1 month ago Painful skin lesion    [facility] Oumou Navarro MD    Office Visit    1 month ago     [facility] Rossy Small PT    Office Visit          Future Appointments         Provider Department Appt Notes    In 1 month Micaela Weston APRN [facility] if you can send a link to start video                       Future Appointments         Provider Department Appt Notes    In 1 month Micaela Weston APRN [facility] if you can send a link to start video          Recent Outpatient Visits              2 weeks ago     [facility] Rossy Small, KWAN    Office Visit    4 weeks  ago Bilateral hip pain    [facility] Sussy Garcia MD    Office Visit    1 month ago     [facility] Rossy Small, KWAN    Office Visit    1 month ago Painful skin lesion    [facility] [place] Oumou Navarro MD    Office Visit    1 month ago     [facility] Rossy Small, KWAN    Office Visit

## 2024-06-07 NOTE — TELEPHONE ENCOUNTER
Per patient he needs refill on his Atorvastatin and patient is running out of medication,  please send to [facility] pharmacy on file verified.    Current Outpatient Medications   Medication Sig Dispense Refill                         atorvastatin 10 MG Oral Tab Take 1 tablet (10 mg total) by mouth nightly. 90 tablet 1

## 2024-06-10 RX ORDER — ATORVASTATIN CALCIUM 10 MG/1
10 TABLET, FILM COATED ORAL NIGHTLY
Qty: 90 TABLET | Refills: 3 | Status: SHIPPED | OUTPATIENT
Start: 2024-06-10

## 2024-07-09 NOTE — TELEPHONE ENCOUNTER
Should have been routed to [facility] for approval    Last OV 4/23/24  No upcoming appt scheduled   There are 2 appts scheduled? Please cancel any appt he is not keeping. Needs f/u appt.      Ok rf x1

## 2024-07-10 ENCOUNTER — TELEMEDICINE (OUTPATIENT)
Dept: SURGERY | Facility: CLINIC | Age: 69
End: 2024-07-10
Payer: COMMERCIAL

## 2024-07-10 VITALS — WEIGHT: 235 LBS | BODY MASS INDEX: 32 KG/M2

## 2024-07-10 DIAGNOSIS — E66.9 OBESITY (BMI 30-39.9): ICD-10-CM

## 2024-07-10 DIAGNOSIS — E78.5 DYSLIPIDEMIA: Primary | ICD-10-CM

## 2024-07-10 NOTE — PATIENT INSTRUCTIONS
[facility]    Please meet with our dietician.     Daily Calories:  120-174 lbs: 1200 calories/day.  175-219 lbs: 1500 calories/day.  220-249 lbs: 1800 calories/day.   250 lbs or more: 2,000 calories/day.    Aim for 85 grams protein/day.    25 grams/meal.     3 PM nuts/seeds.     Eat within 1-3 hours upon waking.     Meals between 7 or 9 AM and 7 PM.     6 days on, 1 day off.     Cronometer. Lose It, for tracking.    Add psyllium husk daily. [employer].     Build up to 35-40 grams of fiber/day.     Aim for 64 oz of water/day.    Aim for a total of:  2 fruits a day (avocado, tomato, citrus/oranges, apples, berries)  1/2 cup or medium size    4 non-starchy vegetables/day (1/2 cup cooked; 1 cup raw) (greens, peppers, onions, garlic, broccoli, cauliflower, brussels sprouts, asparagus, etc.)    0-2 starches/day (oatmeal, sweet potato, carrots, brown rice, etc).    3 protein per day (fish, seafood, meat, or plants: salmon, nuts, seeds, shrimp, chicken, turkey, beans, lentils, chickpeas, etc).    2 healthy fats (avocado, avocado oil, olives, olive oil, salmon, nuts, seeds)    I recommend a whole food, plant powered diet with low glycemic index:     Aim for 3 meals a day and 1-2 snacks as needed.    Aim for a protein + produce at each meal time.     Breakfast ideas:  1. Fruit and nuts/seeds.  2. Eggs scrambled with vegetables.  3. Oatmeal (stovetop), cinnamon, 2 tbsp flaxseed, berries, nuts.  4. Protein shake + fruit. (1 scoop with water/ice). Garden of Life Fit, Nutiva, Rose, and Orgain are good brands.      Snacks:  Raw vegetables and hummus, apples and peanut butter, nuts, seeds, fruit, pecans drizzled with organic honey.      Use the Healthy Plate method for lunch and dinner:  1/2 right side of plate non-starchy vegetables.  Bottom left 1/4 plate protein.  Top left 1/4 starch as desired.      Aim for 150 minutes moderate level exercise weekly with 2-3 days strength training.    Add  Magnesium glycinate 200 to 500 mg/day for sleep.   Life Extension. NOW. Garden of Life. Whole Food Brand. MaryRuth's. Pure Encapsulations.     Or consider Natural Calm.   by Natural Vitality  Follow dosage instructions.  Start 1 teaspoon and increase up to 3 teaspoons as needed for sleep.

## 2024-07-10 NOTE — PROGRESS NOTES
Virtual Video & Audio Check-In    ANGELA Malhotra verbally consents to a Virtual Video & Audio Check-In visit on 07/10/24.  Patient has been referred to the [facility] website at [email or web address] to review the yearly Consent to Treat document.    Patient understands and accepts financial responsibility for any deductible, co-insurance and/or co-pays associated with this service.    Duration of the service: 30 minutes.    Summary of topics discussed: Obesity/weight management, Lifestyle and behavior modifications, Medication management.      The Wellness and Weight Loss Consultation Note       Date of Consult:  7/10/2024    Patient:  ANGELA Malhotra  :      1955  MRN:      [id number]    Referring Provider: Dr. Ventura      Chief Complaint:    Chief Complaint   Patient presents with    Consult    Obesity    Weight Management       SUBJECTIVE     History of Present Illness:  ANGELA Malhotra has been referred to me for evaluation and treatment.       69 yo male.  Presents to clinic for assistance with weight loss/maintenance.   Reports gradual weight gain over time.   Pcp is now Dr. Garcia.     Patient is not considering medications and does not qualify bariatric surgery for weight loss.    Patient is employed: [employer] .  Patient lives with self, partner moving here from [place] [employer].    Patient's goal weight: 190 lbs  Biggest weight loss in the past:  n/a  How weight loss was achieved:   Heaviest weight ever: Current  Previous use of medical weight loss medications:    Physical activity: None     Sleep: 4-5.5 hours/night    Sleep screening: naps 30 minutes/day    Past Medical History:   Past Medical History:    Colitis    1981    Dysplastic nevus    Left lower leg    High cholesterol    History of blood transfusion    no reactions    Melanoma in situ (HCC)    Left upper arm    Onychomycosis    Osteoarthritis    Other and unspecified hyperlipidemia    Plantar fasciitis    - Orthotics     Thoracic or lumbosacral neuritis or radiculitis, unspecified    Tinea    Visual impairment    readers       OBJECTIVE     Vitals: Wt 235 lb (106.6 kg)   BMI 31.87 kg/m²        Wt Readings from Last 6 Encounters:   07/10/24 235 lb (106.6 kg)   05/09/24 229 lb 3.2 oz (104 kg)   03/11/24 230 lb 6.4 oz (104.5 kg)   10/03/23 233 lb (105.7 kg)   08/01/23 238 lb 9.6 oz (108.2 kg)   07/14/23 239 lb (108.4 kg)        Patient Medications:    Current Outpatient Medications   Medication Sig Dispense Refill    atorvastatin 10 MG Oral Tab Take 1 tablet (10 mg total) by mouth nightly. 90 tablet 3    ergocalciferol 1.25 MG (60363 UT) Oral Cap Take 1 capsule (50,000 Units total) by mouth once a week. 12 capsule 1    ketoconazole 2 % External Cream Apply to affected area 2 times daily.to  feet and thighs 60 g 3    Betamethasone Dipropionate Aug 0.05 % External Cream APPLY TO RASH ON NECK TWICE DAILY AS NEEDED 50 g 1       Allergies:  Penicillins     Comorbidities:  Dyslipidemia     Social History:  Reviewed     Surgical History:    Past Surgical History:   Procedure Laterality Date    Colonoscopy      Colonoscopy N/A 3/8/2021    Procedure: COLONOSCOPY;  Surgeon: Quinn Plascencia MD;  Location: [facility] ENDOSCOPY    Tonsillectomy         Family History:    Family History   Problem Relation Age of Onset    Stroke Mother         CVA; Cause of death    Macular degeneration Mother     Diabetes Father     Prostate Cancer Father 67       Typical Dietary Intake:  Breakfast AM Snack Lunch PM Snack Dinner   May skip     Coffee  Juice  Bagel    Or    Eggs, sanchez, hash browns- restaurant  May skip     [place]    Soup      Carrots  Pizza  Soup- restaurant    Pasta      After dinner behavior: -  Night eating: -  Portion sizes: +  Binge: -  Emotional:   Depression:  Grazing:   Sweet tooth: +  Crunchy/salty: +  Etoh: occasional vodka drinks   Drinks mostly water  Soda Drinker: No    Juice:  Yes      Number of restaurant or fast food meals/week:   meals/week    Nutritional Goals Reviewed and Discussed:     Eat 3-4 cups of fresh fruit or vegetables daily    Behavior Modifications Reviewed and Discussed:    Eat breakfast, Eat 3 meals per day, Plan meals in advance, Read nutrition labels, Drink 64oz of water per day, Maintain a daily food journal, Utilize portion control strategies to reduce calorie intake, Identify triggers for eating and manage cues, and Eat slowly and take 20 to 30 minutes to complete each meal      ROS:  Constitutional: positive for fatigue  Respiratory: negative  Cardiovascular: negative  Gastrointestinal: negative  Integument/breast: negative  Hematologic/lymphatic: negative  Musculoskeletal:negative  Neurological: negative  Behavioral/Psych: negative  Endocrine: negative    Physical Exam:  General: alert, oriented x 3, cooperative, speaking in full sentences, appears stated age and cooperative, obese   Head: Normocephalic, without obvious abnormality, atraumatic  Neck: symmetrical, trachea midline   Lungs: No increased work of breathing   Extremities: extremities normal  Skin: Upper body skin color and texture appear intact       ASSESSMENT       Encounter Diagnosis(ses):   1. Dyslipidemia    2. Obesity (BMI 30-39.9)        PLAN         Diagnoses and all orders for this visit:    Dyslipidemia  -     Jump Start Your Health; Future  -     DIETITIAN EDUCATION INITIAL, DIET (INTERNAL)    Obesity (BMI 30-39.9)  -     Jump Start Your Health; Future  -     DIETITIAN EDUCATION INITIAL, DIET (INTERNAL)      DYSLIPIDEMIA: Recommend dietary changes and lifestyle modifications as discussed below. Monitor.       Lab Results   Component Value Date/Time    CHOLEST 170 12/09/2021 09:40 AM    LDL 86 12/09/2021 09:40 AM    HDL 62 (H) 12/09/2021 09:40 AM    TRIG 112 12/09/2021 09:40 AM    VLDL 22 12/09/2021 09:40 AM       OBESITY/WEIGHT GAIN:    Recommended intensive lifestyle and behavioral modifications at this time for weight loss.    Educated patient on  lifestyle modifications: Whole Food/Plant Strong/Low Glycemic Index diet, moderate alcohol consumption, reduced sodium intake to no more than 2,400 mg/day, and at least 150 minutes of moderate physical activity per week.   Avoid processed, poor quality carbohydrates, refined grains, flour, sugar.    Goals for next month:  1. Keep a food log.  2. Drink 64 ounces of non-caloric beverages per day. No fruit juices or regular soda.  3. Aim for 150 minutes moderate exercise per week.    4. Increase fruit and vegetable servings to 5-6 per day.    5. Improve sleep and stress.     Reviewed labs.    Discussed medication is an option for weight loss at this time.    Patient prefers to hold off on medications for weight loss at this time and implement lifestyle and behavioral modifications for weight loss.    Meet with RD.  Consider Jump Start.   Improve lunch to whole food meal with protein and produce.     I spent 30 minutes preparing chart, obtaining/reviewing pertinent history, performing medically appropriate examination/evaluations, counseling/educating on assessment and plan, ordering and reviewing tests/medications as needed, referring/communicating with other health care professionals as needed, documenting clinical information, interpreting results, communicating results, and/or coordinating patient care.     RTC 3-4 months.      DASIA Howell

## 2024-07-15 ENCOUNTER — TELEPHONE (OUTPATIENT)
Dept: INTERNAL MEDICINE CLINIC | Facility: CLINIC | Age: 69
End: 2024-07-15

## 2024-07-15 DIAGNOSIS — E66.9 OBESITY, CLASS I, BMI 30-34.9: Primary | ICD-10-CM

## 2024-07-15 NOTE — TELEPHONE ENCOUNTER
----- Message from Fiorella MILLER sent at 7/9/2024  2:21 PM CDT -----  Regarding: Referral Needed  Good morning,    Can a referral be placed in this patient's chart for Dr. Micaela Weston?    Thank you,    Fiorella

## 2024-08-21 NOTE — ADDENDUM NOTE
Addended by: Dakota Poe on: 12/16/2019 09:29 PM     Modules accepted: Orders
no weight-bearing restrictions

## 2024-09-05 PROBLEM — Z86.006 HISTORY OF MELANOMA IN SITU: Status: ACTIVE | Noted: 2022-02-02

## 2024-09-09 ENCOUNTER — OFFICE VISIT (OUTPATIENT)
Dept: INTERNAL MEDICINE CLINIC | Facility: CLINIC | Age: 69
End: 2024-09-09

## 2024-09-09 VITALS
HEART RATE: 68 BPM | BODY MASS INDEX: 31.72 KG/M2 | SYSTOLIC BLOOD PRESSURE: 140 MMHG | HEIGHT: 72 IN | OXYGEN SATURATION: 98 % | WEIGHT: 234.19 LBS | DIASTOLIC BLOOD PRESSURE: 82 MMHG

## 2024-09-09 DIAGNOSIS — R35.1 NOCTURIA ASSOCIATED WITH BENIGN PROSTATIC HYPERPLASIA: ICD-10-CM

## 2024-09-09 DIAGNOSIS — F41.1 ANXIETY STATE: ICD-10-CM

## 2024-09-09 DIAGNOSIS — R03.0 ELEVATED BLOOD PRESSURE READING IN OFFICE WITHOUT DIAGNOSIS OF HYPERTENSION: ICD-10-CM

## 2024-09-09 DIAGNOSIS — M17.12 OSTEOARTHRITIS OF LEFT KNEE, UNSPECIFIED OSTEOARTHRITIS TYPE: ICD-10-CM

## 2024-09-09 DIAGNOSIS — F43.9 STRESS: ICD-10-CM

## 2024-09-09 DIAGNOSIS — Z00.00 ANNUAL PHYSICAL EXAM: Primary | ICD-10-CM

## 2024-09-09 DIAGNOSIS — E78.00 HYPERCHOLESTEROLEMIA: ICD-10-CM

## 2024-09-09 DIAGNOSIS — N40.1 NOCTURIA ASSOCIATED WITH BENIGN PROSTATIC HYPERPLASIA: ICD-10-CM

## 2024-09-09 DIAGNOSIS — E66.9 OBESITY (BMI 30-39.9): ICD-10-CM

## 2024-09-09 NOTE — PROGRESS NOTES
ANGELA Malhotra is a 68 year old male who is here for  1. Annual physical exam    2. Hypercholesterolemia    3. Osteoarthritis of left knee, unspecified osteoarthritis type    4. Obesity (BMI 30-39.9)    5. Stress    6. Anxiety state    7. Nocturia associated with benign prostatic hyperplasia          HPI:   ANGELA Malhotra is a 68 year old male presents for annual physical.     Planning to retire next year. Has been working at the [employer] as a /.    Past Medical History:    Colitis    1981    Dysplastic nevus    Left lower leg    High cholesterol    History of blood transfusion    no reactions    Melanoma in situ (HCC)    Left upper arm    Onychomycosis    Osteoarthritis    Other and unspecified hyperlipidemia    Plantar fasciitis    - Orthotics    Thoracic or lumbosacral neuritis or radiculitis, unspecified    Tinea    Visual impairment    readers     Past Surgical History:   Procedure Laterality Date    Colonoscopy      Colonoscopy N/A 3/8/2021    Procedure: COLONOSCOPY;  Surgeon: Quinn Plascencia MD;  Location: [facility] ENDOSCOPY    Tonsillectomy         Current Outpatient Medications:     atorvastatin 10 MG Oral Tab, Take 1 tablet (10 mg total) by mouth nightly., Disp: 90 tablet, Rfl: 3    ergocalciferol 1.25 MG (66430 UT) Oral Cap, Take 1 capsule (50,000 Units total) by mouth once a week., Disp: 12 capsule, Rfl: 1    ketoconazole 2 % External Cream, Apply to affected area 2 times daily.to  feet and thighs, Disp: 60 g, Rfl: 3    Betamethasone Dipropionate Aug 0.05 % External Cream, APPLY TO RASH ON NECK TWICE DAILY AS NEEDED, Disp: 50 g, Rfl: 1    Allergies:  Allergies   Allergen Reactions    Penicillins UNKNOWN     Unable to remember reactions     Social History     Socioeconomic History    Marital status: Single     Spouse name: Not on file    Number of children: Not on file    Years of education: Not on file    Highest education level: Not on file   Occupational History    Not on file    Tobacco Use    Smoking status: Never    Smokeless tobacco: Never   Vaping Use    Vaping status: Never Used   Substance and Sexual Activity    Alcohol use: Yes     Alcohol/week: 1.0 - 2.0 standard drink of alcohol     Types: 1 - 2 Shots of liquor per week     Comment: 1-2 drinks weekly    Drug use: No    Sexual activity: Not Currently   Other Topics Concern     Service Not Asked    Blood Transfusions Not Asked    Caffeine Concern Yes     Comment: Coffee, 3 cups daily;     Occupational Exposure Not Asked    Hobby Hazards Not Asked    Sleep Concern Not Asked    Stress Concern Not Asked    Weight Concern Not Asked    Special Diet Not Asked    Back Care Not Asked    Exercise Not Asked    Bike Helmet Not Asked    Seat Belt Not Asked    Self-Exams Not Asked    Grew up on a farm No    History of tanning Yes    Outdoor occupation No    Reaction to local anesthetic No    Pt has a pacemaker No    Pt has a defibrillator No   Social History Narrative    Not on file     Social Determinants of Health     Financial Resource Strain: Not on file   Food Insecurity: Not on file   Transportation Needs: Not on file   Physical Activity: Not on file   Stress: Not on file   Social Connections: Not on file   Housing Stability: Not on file       REVIEW OF SYSTEMS:     GENERAL HEALTH: No fevers, chills, sweats, fatigue  VISION: No recent vision problems, blurry vision or double vision  HEENT: No decreased hearing ear pain nasal congestion or sore throat  SKIN: denies any unusual skin lesions or rashes  RESPIRATORY: denies shortness of breath, cough, wheezing  CARDIOVASCULAR: denies chest pain on exertion, palpitations, swelling in feet  GI: denies abdominal pain and denies heartburn, nausea or vomiting  : No Pain on urination, change in the color of urine, discharge, urinating frequently  MUS: No back pain, joint pain, muscle pain  NEURO: denies headaches , anxiety, depression    EXAM:     Vitals:    09/09/24 1048   BP: 154/74    Pulse: 67   SpO2: 97%   Weight: 234 lb 3.2 oz (106.2 kg)   Height: 6' (1.829 m)     GENERAL: well developed, well nourished,in no apparent distress  SKIN: no rashes,no suspicious lesions  HEENT: atraumatic, normocephalic,ears and throat are clear,   NECK: supple,no adenopathy,  LUNGS: clear to auscultation, no wheeze  CARDIO: RRR without murmur  GI: good BS's,no masses or tenderness  EXTREMITIES: no cyanosis, or edema    ASSESSMENT AND PLAN:   1. Annual physical exam  - CBC With Differential With Platelet; Future  - Comp Metabolic Panel (14); Future  - TSH W Reflex To Free T4; Future  - Lipid Panel; Future    2. Bilateral hip pain  3. Osteoarthritis of left knee, unspecified osteoarthritis type  - chronic, follows with ortho and gets occasional steroid injections and does PT  Plan  - Physical [facility] Referral - [facility]    4. Obesity, Class I, BMI 30-34.9  5. Elevated blood pressure without diagnosis of hypertension  -Initial weight 234 lbs, BMI 31.76  - The patient participated in a comprehensive weight management program that encourages behavioral modification, reduced calorie diet and increased physical activity with continuing follow up for at least 6 months prior to using drug therapy.  - elevated Blood pressure, not ideal candidate for phentermine, diethylpropion, Contrave due to elevated blood pressure  Plan  -start wegovy, given sample of 0.25 and Rx'ed Wegovy 0.5 mg weekly  -Discussed Kcal goals <1800 Kcal, carbohydrate goal <100 grams  -Exercise goal 1 hour daily  -Stress management 10 minutes meditation before bedtime  -Drink at least 64 oz water daily    6. Screening PSA (prostate specific antigen)  - PSA (Screening) [E]; Future    Time spent on this encounter including face to face and chart review 40 minutes.       The patient indicates understanding of these issues and agrees to the plan.    No follow-ups on file.        Sussy Garcia MD  5/9/2024

## 2024-09-10 ENCOUNTER — TELEPHONE (OUTPATIENT)
Dept: INTERNAL MEDICINE CLINIC | Facility: CLINIC | Age: 69
End: 2024-09-10

## 2024-09-10 NOTE — TELEPHONE ENCOUNTER
Current Outpatient Medications:     semaglutide-weight management 0.5 MG/0.5ML Subcutaneous Solution Auto-injector, Inject 0.5 mL (0.5 mg total) into the skin once a week for 4 doses., Disp: 2 mL, Rfl: 0

## 2024-09-11 NOTE — TELEPHONE ENCOUNTER
Prior auth completed via CoverMyMeds and approved:    Your prior authorization for Wegovy has been approved!  More Info  Personalized support and financial assistance may be available through the 's [employer] [place] program. For more information, and to see program requirements, click on the More Info button to the right.    Message from plan: Your request was approved based on the initial information provided at the time of the coverage request submission. Please allow additional time for the final decision to be made and added to the patient's account.     Key: [id number]  Autotether message sent to patient.

## 2024-11-04 ENCOUNTER — OFFICE VISIT (OUTPATIENT)
Dept: DERMATOLOGY CLINIC | Facility: CLINIC | Age: 69
End: 2024-11-04

## 2024-11-04 DIAGNOSIS — D22.9 ATYPICAL NEVI: ICD-10-CM

## 2024-11-04 DIAGNOSIS — D23.9 BENIGN NEOPLASM OF SKIN, UNSPECIFIED LOCATION: ICD-10-CM

## 2024-11-04 DIAGNOSIS — Z13.89 ENCOUNTER FOR SURVEILLANCE OF ABNORMAL NEVI: ICD-10-CM

## 2024-11-04 DIAGNOSIS — D22.9 MULTIPLE NEVI: Primary | ICD-10-CM

## 2024-11-04 DIAGNOSIS — L71.9 ROSACEA: ICD-10-CM

## 2024-11-04 DIAGNOSIS — L82.1 SK (SEBORRHEIC KERATOSIS): ICD-10-CM

## 2024-11-04 DIAGNOSIS — L57.0 AK (ACTINIC KERATOSIS): ICD-10-CM

## 2024-11-04 DIAGNOSIS — B35.1 ONYCHOMYCOSIS: ICD-10-CM

## 2024-11-04 DIAGNOSIS — L30.9 DERMATITIS: ICD-10-CM

## 2024-11-04 DIAGNOSIS — Z85.820 ENCOUNTER FOR FOLLOW-UP SURVEILLANCE OF MELANOMA: ICD-10-CM

## 2024-11-04 DIAGNOSIS — Z08 ENCOUNTER FOR FOLLOW-UP SURVEILLANCE OF MELANOMA: ICD-10-CM

## 2024-11-04 DIAGNOSIS — L81.4 LENTIGO: ICD-10-CM

## 2024-11-04 PROCEDURE — G2211 COMPLEX E/M VISIT ADD ON: HCPCS | Performed by: DERMATOLOGY

## 2024-11-04 PROCEDURE — 99214 OFFICE O/P EST MOD 30 MIN: CPT | Performed by: DERMATOLOGY

## 2024-11-17 NOTE — PROGRESS NOTES
ANGELA Malhotra is a 69 year old male.  HPI:     CC:    Chief Complaint   Patient presents with    Full Skin Exam     LOV 04/29/24- Pt presents for a Full Body Skin Exam. Pt denies any new lesions or areas of concern. Dermatitis, rosacea, tinea on the thighs, and onychomycosis on feet stable with creams.    Personal Hx of Melanoma in situ on the Left arm (exicsion 01/22) and dysplastic nevus Left lower leg (04/22), and Aks.          Allergies:  Penicillins    HISTORY:    Past Medical History:    Colitis    1981    Dysplastic nevus    Left lower leg    High cholesterol    History of blood transfusion    no reactions    Melanoma in situ (HCC)    Left upper arm    Onychomycosis    Osteoarthritis    Other and unspecified hyperlipidemia    Plantar fasciitis    - Orthotics    Thoracic or lumbosacral neuritis or radiculitis, unspecified    Tinea    Visual impairment    readers      Past Surgical History:   Procedure Laterality Date    Colonoscopy      Colonoscopy N/A 3/8/2021    Procedure: COLONOSCOPY;  Surgeon: Quinn Plascencia MD;  Location: [facility] ENDOSCOPY    Tonsillectomy        Family History   Problem Relation Age of Onset    Stroke Mother         CVA; Cause of death    Macular degeneration Mother     Diabetes Father     Prostate Cancer Father 67      Social History     Socioeconomic History    Marital status: Single   Tobacco Use    Smoking status: Never    Smokeless tobacco: Never   Vaping Use    Vaping status: Never Used   Substance and Sexual Activity    Alcohol use: Yes     Alcohol/week: 1.0 - 2.0 standard drink of alcohol     Types: 1 - 2 Shots of liquor per week     Comment: 1-2 drinks weekly    Drug use: No    Sexual activity: Not Currently   Other Topics Concern    Caffeine Concern Yes     Comment: Coffee, 3 cups daily;     Grew up on a farm No    History of tanning Yes    Outdoor occupation No    Reaction to local anesthetic No    Pt has a pacemaker No    Pt has a defibrillator No        Current  Outpatient Medications   Medication Sig Dispense Refill    atorvastatin 10 MG Oral Tab Take 1 tablet (10 mg total) by mouth nightly. 90 tablet 3    ketoconazole 2 % External Cream Apply to affected area 2 times daily.to  feet and thighs 60 g 3    Betamethasone Dipropionate Aug 0.05 % External Cream APPLY TO RASH ON NECK TWICE DAILY AS NEEDED 50 g 1    ergocalciferol 1.25 MG (15120 UT) Oral Cap Take 1 capsule (50,000 Units total) by mouth once a week. (Patient not taking: Reported on 11/4/2024) 12 capsule 1     Allergies:   Allergies   Allergen Reactions    Penicillins UNKNOWN     Unable to remember reactions       Past Medical History:    Colitis    1981    Dysplastic nevus    Left lower leg    High cholesterol    History of blood transfusion    no reactions    Melanoma in situ (HCC)    Left upper arm    Onychomycosis    Osteoarthritis    Other and unspecified hyperlipidemia    Plantar fasciitis    - Orthotics    Thoracic or lumbosacral neuritis or radiculitis, unspecified    Tinea    Visual impairment    readers     Past Surgical History:   Procedure Laterality Date    Colonoscopy      Colonoscopy N/A 3/8/2021    Procedure: COLONOSCOPY;  Surgeon: Quinn Plascencia MD;  [facility] ENDOSCOPY    Tonsillectomy       Social History     Socioeconomic History    Marital status: Single     Spouse name: Not on file    Number of children: Not on file    Years of education: Not on file    Highest education level: Not on file   Occupational History    Not on file   Tobacco Use    Smoking status: Never    Smokeless tobacco: Never   Vaping Use    Vaping status: Never Used   Substance and Sexual Activity    Alcohol use: Yes     Alcohol/week: 1.0 - 2.0 standard drink of alcohol     Types: 1 - 2 Shots of liquor per week     Comment: 1-2 drinks weekly    Drug use: No    Sexual activity: Not Currently   Other Topics Concern     Service Not Asked    Blood Transfusions Not Asked    Caffeine Concern Yes     Comment:  Coffee, 3 cups daily;     Occupational Exposure Not Asked    Hobby Hazards Not Asked    Sleep Concern Not Asked    Stress Concern Not Asked    Weight Concern Not Asked    Special Diet Not Asked    Back Care Not Asked    Exercise Not Asked    Bike Helmet Not Asked    Seat Belt Not Asked    Self-Exams Not Asked    Grew up on a farm No    History of tanning Yes    Outdoor occupation No    Reaction to local anesthetic No    Pt has a pacemaker No    Pt has a defibrillator No   Social History Narrative    Not on file     Social Drivers of Health     Financial Resource Strain: Not on file   Food Insecurity: Not on file   Transportation Needs: Not on file   Physical Activity: Not on file   Stress: Not on file   Social Connections: Not on file   Housing Stability: Not on file     Family History   Problem Relation Age of Onset    Stroke Mother         CVA; Cause of death    Macular degeneration Mother     Diabetes Father     Prostate Cancer Father 67       There were no vitals filed for this visit.    HPI:    Chief Complaint   Patient presents with    Full Skin Exam     LOV 04/29/24- Pt presents for a Full Body Skin Exam. Pt denies any new lesions or areas of concern. Dermatitis, rosacea, tinea on the thighs, and onychomycosis on feet stable with creams.    Personal Hx of Melanoma in situ on the Left arm (exicsion 01/22) and dysplastic nevus Left lower leg (04/22), and Aks.      Patient for follow-up  For skin exam  History of melanoma in situ, dysplastic nevi melanoma type II 4/8/2022     melanoma in situ post wide excision left upper arm, history of AK's    Family history of skin cancer, patient for follow-up.    Past notes/ records and appropriate/relevant lab results including pathology and past body maps reviewed. Including outside notes/ PCP notes as appropriate. Updated and new information noted in current visit.     Patient presents with concerns above.    Patient has been in their usual state of health.      History,  medications, allergies reviewed as noted.      ROS:  new relevant systemic complaints as noted       Physical Examination:     Well-developed well-nourished patient alert oriented in no acute distress.  Exam total-body performed, including scalp, head, neck, face,nails, hair, external eyes, including conjunctival mucosa, eyelids, lips external ears, back, chest,/ breasts, axillae,  abdomen, arms, legs, palms.     Multiple light to medium brown, well marginated, uniformly pigmented, macules and papules 6 mm and less scattered on exam. pigmented lesions examined with dermoscopy benign-appearing patterns.     Waxy tannish keratotic papules scattered, cherry-red vascular papules scattered.    See map today's date for lesions noted .      Otherwise remarkable for lesions as noted on map.  See details of examination  See Assessment /Plan for additional history and physical exam also:    Assessment / plan:    No orders of the defined types were placed in this encounter.      Meds & Refills for this Visit:  Requested Prescriptions      No prescriptions requested or ordered in this encounter         Encounter Diagnoses   Name Primary?    Multiple nevi Yes    SK (seborrheic keratosis)     Lentigo     Atypical nevi     Encounter for surveillance of abnormal nevi     Encounter for follow-up surveillance of melanoma     Benign neoplasm of skin, unspecified location     AK (actinic keratosis)     Rosacea     Dermatitis     Onychomycosis        See details on map.      Remarkable for:  Patient seen for follow-up long-term monitoring, treatment of  Atypical nevi, sun damage AK's, history of melanoma.  High risk patient.  Other skin conditions  Plan of care:  ongoing surveillance, monitoring including regular follow-up due to longer term risk of recurrence, new lesions.  See previous notes.  There is a longitudinal care relationship with me, the care plan reflects the ongoing nature of the continuous relationship of care, and the  medical record indicates that there is ongoing treatment of a serious/complex medical condition which I am currently managing.  is Applicable    Plan is to continue monitoring every 4 to 6 months or as needed    History of inflamed acneiform nodules, boils continue clindamycin gel as needed oral antibiotics if worsening to let us know is doing    Dermatitis stable overall doing well continue triamcinolone, Diprolene cream for more severe flare dermatitis.  Overall improved.  Continue regular monitoring meds in grid.  Skin care instructions reviewed.  [place] use of emollients.  Pathophysiology reviewed.  Consider Contac allergy in differential.  Consider patch testing.  Patient will let us know how they are doing over the next several weeks.  Await clinical response to above therapies.    pt with history of MIS left arm post wide excision. 1/22 Doing well.  Patient at high risk given family history skin cancer grew up in [place] was in the sun excessively.  Lived in [place] for a number of years.  Patient will follow-up every 3 to 4 months for routine skin check.  At risk for melanoma and nonmelanoma skin cancers.    Mildly atypical compound nevus left lower leg4/22  No recurrence.  Monitor carefully  No new clinically atypical pigmented lesions on dermoscopy all benign patterns.  Continue careful monitoring    With history melanoma in situ and atypical nevi as well as history of sun damage follow-up every 3 to 4 months.  Continue careful follow-up      Actinic Keratoses.  Precancerous nature discussed. Sun protection, sunscreen/ blocks encouraged .  Monitoring for new lesions.  Sun damage additional recurrent and new actinic keratoses, skin cancers may occur in areas of prior actinic keratoses, related to past sun exposure to minimize current sun exposure.  Sunscreen applied consistently regularly, reapplication and sun protection while driving recommended.    Erythema fine papules telangiectasia of the nose  and cheeks continue sun protection MetroGel nightly encouraged more consistent use more erythema noted.  Has not been using this as frequently as ideal stress at work.  Looking forward to long-term in the next several years.  .Rosacea.  Meds in grid.  Skin care instructions reviewed.  Pathophysiology reviewed.  Chronic recurrent nature discussed.  Patient will let us know how they are doing over the next several weeks.  Await clinical response to above therapy.   Darker brown macule lower back observe unchanged small nevi over the legs arms back observe carefully consistent with junctional nevi.  Monitor    Erythema scaling over the plantar feet onychomycotic changes noted.  Keratotic changes we will add topical antifungal, Kerydin overall improving continue current regimen stable improving    History of tinea ketoconazole as needed, onychomycosis longstanding over-the-counter spray, consider additional topicals.  Not particular bothersome    Irritated tag-like area reported perianal inflamed intermittently follow-up with Dr. Mcbride for this.    No other susupicious lesions on todays  exam.    please refer to map for specific lesions.  See additional diagnoses.  Pros cons of various therapies, risks benefits discussed.Pathophysiology discussed with patient.  Therapeutic options reviewed.  See  Medications in grid.  Instructions reviewed at length.    Benign nevi, seborrheic  keratoses, cherry angiomas:  Reassurance regarding other benign skin lesions.Signs and symptoms of skin cancer, ABCDE's of melanoma discussed with patient. Sunscreen use, sun protection, self exams reviewed.  Followup as noted RTC routine checkup 6 mos - one year or p.r.n.    Encounter Times Including precharting, reviewing chart, prior notes obtaining history: 10 minutes, medical exam :10 minutes, notes on body map, plan, counseling 10minutes My total time spent caring for the patient on the day of the encounter: 30 minutes     The patient  indicates understanding of these issues and agrees to the plan.  The patient is asked to return as noted in follow-up/ above.    This note was generated using Dragon voice recognition software.  Please contact me regarding any confusion resulting from errors in recognition.   Note to patient and family: The 21st Century Cures Act makes medical notes like these available to patients. However, be advised this is a medical document. It is intended as [id number] communication and monitoring of a patient's care needs. It is written in medical language and may contain abbreviations or verbiage that are unfamiliar. It may appear blunt or direct. Medical documents are intended to carry relevant information, facts as evident and the clinical opinion of the practitioner.

## 2025-01-07 NOTE — ED PROVIDER NOTES
Patient Seen in: [facility] Emergency Department    History     Chief Complaint   Patient presents with    Leg Pain     Stated Complaint: Leg Pain, swollen    HPI    Patient complains of  swollen r leg.  Started last night.  Has been doing a lot of renovation work in house no specific direct trauma.  Pain in calf. No foot pain, no numbness in foot or cold foot.. no chest pain, no SOB.  no recent travel, no immobilization.  r calf pain.  no fever, no redness    Alleviating factors: rest  Exacerbating factors: walking    Past Medical History:    Colitis    1981    Dysplastic nevus    Left lower leg    High cholesterol    History of blood transfusion    no reactions    Melanoma in situ (HCC)    Left upper arm    Onychomycosis    Osteoarthritis    Other and unspecified hyperlipidemia    Plantar fasciitis    - Orthotics    Thoracic or lumbosacral neuritis or radiculitis, unspecified    Tinea    Visual impairment    readers       Past Surgical History:   Procedure Laterality Date    Colonoscopy      Colonoscopy N/A 3/8/2021    Procedure: COLONOSCOPY;  Surgeon: Quinn Plascencia MD;  Location: [facility] ENDOSCOPY    Tonsillectomy              Family History   Problem Relation Age of Onset    Stroke Mother         CVA; Cause of death    Macular degeneration Mother     Diabetes Father     Prostate Cancer Father 67       Social History     Socioeconomic History    Marital status:    Tobacco Use    Smoking status: Never    Smokeless tobacco: Never   Vaping Use    Vaping status: Never Used   Substance and Sexual Activity    Alcohol use: Yes     Alcohol/week: 1.0 - 2.0 standard drink of alcohol     Types: 1 - 2 Shots of liquor per week     Comment: 1-2 drinks weekly    Drug use: No    Sexual activity: Not Currently   Other Topics Concern    Caffeine Concern Yes     Comment: Coffee, 3 cups daily;     Grew up on a farm No    History of tanning Yes    Outdoor occupation No    Reaction to local anesthetic No    Pt has  a pacemaker No    Pt has a defibrillator No       Review of Systems    Positive for stated complaint: Leg Pain, swollen  Other systems are as noted in HPI.  Constitutional and vital signs reviewed.      All other systems reviewed and negative except as noted above.    PSFH elements reviewed from today and agreed except as otherwise stated in HPI.    Physical Exam     ED Triage Vitals   BP 01/07/25 0935 (!) 156/91   Pulse 01/07/25 0935 78   Resp 01/07/25 0935 18   Temp 01/07/25 0935 98 °F (36.7 °C)   Temp src --    SpO2 01/07/25 0935 98 %   O2 Device 01/07/25 1040 None (Room air)       Current:/80   Pulse 72   Temp 98 °F (36.7 °C)   Resp 18   Wt 99.8 kg   SpO2 98%   BMI 29.84 kg/m²    PULSE OX nl  GENERAL: awake alert  HEAD: normocephalic, atraumatic,   EYES: PERRLA, EOMI, conj sclera clear    LUNGS: no resp distress,   CARDIO: RR    EXTREMITIES: 2+ dp bilateral, both feet warm non tender r mid calf firm tender no erythema or warmth.  No swelling in thigh medially no popliteal tenderenss  NEURO: alert and oiented *3, 2-12 intact, no focal deficit noted  SKIN:no erythema  PSYCH: calm, cooperative,    Differential includes: calf injury with hematoma vs. DVT vs. Lymphedema vs. Venous insufficiency vs  Bakers cyst      ED Course   Labs Reviewed - No data to display    MDM           Radiology Interpretation:  US VENOUS DOPPLER LEG RIGHT - DIAG IMG (CPT=93971)    Result Date: 1/7/2025  CONCLUSION:   Nonvisualization of the right posterior tibial and peroneal veins.  Otherwise no DVT within the right lower extremity.  8.8 cm mass within the gastrocnemius muscle suggestive of a subacute hematoma.     Dictated by (CST): Raj Valencia MD on 1/07/2025 at 11:11 AM     Finalized by (CST): Raj Valencia MD on 1/07/2025 at 11:12 AM               Medical Decision Making  Problems Addressed:  Hematoma of right lower leg: acute illness or injury     Details: No evidence of compartment syndrome, foot warm good pulses no  numbness no tense compartment  Strain of calf muscle, right, initial encounter: acute illness or injury    Amount and/or Complexity of Data Reviewed  Radiology: ordered. Decision-making details documented in ED Course.  Discussion of management or test interpretation with external provider(s): Tylenol, motrin recommended.      Risk  OTC drugs.        Disposition and Plan     Clinical Impression:  1. Strain of calf muscle, right, initial encounter    2. Hematoma of right lower leg        Disposition:  Discharge    Follow-up:  Amaris Henley MD  [address] [place] [id number]  [phone]    Follow up        Medications Prescribed:  Discharge Medication List as of 1/7/2025 11:50 AM

## 2025-01-07 NOTE — ED INITIAL ASSESSMENT (HPI)
Patient complains of R calf pain and swelling since yesterday, denies injury, denies chest pain/shauna

## 2025-01-10 NOTE — PROGRESS NOTES
Patient had recent Emergency Room visit, calling to offer Primary Care Physician follow-up appointment (discharged 01/07)    Dr Amaris Henley  [address] [place] [id number]  [phone]  Patient has existing appointment Wed 01/22 @8:00am    Attempt #1:  Left message on voicemail for patient to call transitions specialist back to schedule follow up appointments. Provided Transitions specialist scheduling phone number [phone].

## 2025-01-10 NOTE — PROGRESS NOTES
Voicemail received; called patient back    Patient had recent Emergency Room visit, calling to offer Primary Care Physician follow-up appointment (discharged 01/07)     Dr Amaris Henley  [address] [place] [id number]  [phone]  Patient has existing appointment Wed 01/22 @8:00am  Left Voicemail to call [phone] to assist w/scheduling apt

## 2025-01-13 NOTE — PROGRESS NOTES
Voicemail received; called patient back     Patient had recent Emergency Room visit, calling to offer Primary Care Physician follow-up appointment (discharged 01/07)     Dr Amaris Henley  [address] [place] [id number]  [phone]  Patient has existing appointment Wed 01/22 @8:00am & on waitlist for sooner appointment  Patient declined sooner appointment, because of insurance, patient has HMO, he can only see Dr Henley or someone who takes his HMO.  Closing encounter

## 2025-01-18 NOTE — ED INITIAL ASSESSMENT (HPI)
Patient comes in states that he fell x 1week ago and a potential blood clot was ruled out in er at [facility], today he comes in due to superficial sensitivity to the site of injury, warmth and pain that continues to be painful. Patient also flew x 1week ago and then returned 3 days ago 1.5 hr flight. Would to make sure that this is not an infection no open skin noted.

## 2025-01-18 NOTE — ED PROVIDER NOTES
Patient Seen in: [facility]      History     Chief Complaint   Patient presents with    Skin Problem     Stated Complaint: Leg issue    Subjective:   HPI      69 yr old male here for evaluation of worsening sensitivity, discomfort, warmth to right lower leg in the last 24-48 hours. He reports falling forward walking down steps when moving out of his house just after Matthew. He did not seek medical attn until noticing swelling, firmness and discomfort on 1/6/25 at night, going to ER the next day. US was performed and no DVT was found but pt had a 3x8cm gastroc hematoma on imaging. He has been alternating heat and ice, elevating it and taking tylenol. He denies head trauma or LOC. He denies hx dvt/pe, blood thinner use in past. He did get the ok to fly after US was performed and returned 3 days ago. HE denies chest pain, shortness of breath, dizziness, vision changes or palpitations.    Objective:     Past Medical History:    Colitis    1981    Dysplastic nevus    Left lower leg    High cholesterol    History of blood transfusion    no reactions    Melanoma in situ (HCC)    Left upper arm    Onychomycosis    Osteoarthritis    Other and unspecified hyperlipidemia    Plantar fasciitis    - Orthotics    Thoracic or lumbosacral neuritis or radiculitis, unspecified    Tinea    Visual impairment    readers              Past Surgical History:   Procedure Laterality Date    Colonoscopy      Colonoscopy N/A 3/8/2021    Procedure: COLONOSCOPY;  Surgeon: Quinn Plascencia MD;  Location: [facility] ENDOSCOPY    Tonsillectomy                  Social History     Socioeconomic History    Marital status:    Tobacco Use    Smoking status: Never     Passive exposure: Never    Smokeless tobacco: Never   Vaping Use    Vaping status: Never Used   Substance and Sexual Activity    Alcohol use: Yes     Alcohol/week: 1.0 - 2.0 standard drink of alcohol     Types: 1 - 2 Shots of liquor per week     Comment: 1-2 drinks  weekly    Drug use: No    Sexual activity: Not Currently   Other Topics Concern    Caffeine Concern Yes     Comment: Coffee, 3 cups daily;     Grew up on a farm No    History of tanning Yes    Outdoor occupation No    Reaction to local anesthetic No    Pt has a pacemaker No    Pt has a defibrillator No              Review of Systems    Positive for stated complaint: Leg issue  Other systems are as noted in HPI.  Constitutional and vital signs reviewed.      All other systems reviewed and negative except as noted above.    Physical Exam     ED Triage Vitals [01/18/25 0930]   /87   Pulse 83   Resp 18   Temp 99 °F (37.2 °C)   Temp src Oral   SpO2 98 %   O2 Device None (Room air)       Current Vitals:   No data recorded      Physical Exam  Vitals and nursing note reviewed.   Constitutional:       General: He is not in acute distress.     Appearance: Normal appearance. He is not ill-appearing, toxic-appearing or diaphoretic.   Cardiovascular:      Rate and Rhythm: Normal rate.      Pulses: Normal pulses.   Pulmonary:      Effort: Pulmonary effort is normal. No respiratory distress.   Musculoskeletal:      Right knee: Effusion present. No swelling, deformity, erythema, ecchymosis, bony tenderness or crepitus. Normal range of motion. No tenderness. Normal alignment. Normal pulse.      Right lower leg: Swelling and tenderness (calf) present. No deformity, lacerations or bony tenderness.      Right ankle: No swelling, deformity or ecchymosis. No tenderness. Normal range of motion. Normal pulse.      Right foot: Normal range of motion and normal capillary refill. No swelling or crepitus. Normal pulse.   Skin:     General: Skin is warm and dry.   Neurological:      Mental Status: He is alert and oriented to person, place, and time.   Psychiatric:         Mood and Affect: Mood normal.         Behavior: Behavior normal.           ED Course   Labs Reviewed - No data to display    ED Course as of 01/20/25  0845  ------------------------------------------------------------  Time: 01/18 1034  Value: XR KNEE (1 OR 2 VIEWS), RIGHT (CPT=73560)  Comment: Impression  CONCLUSION:  1. No radiographically visible acute osseous injury of the right knee.     2. Mild primary osteoarthritic changes of the right knee are appreciated.    ------------------------------------------------------------  Time: 01/18 1034  Value: XR TIBIA + FIBULA (2 VIEWS), RIGHT (CPT=73590)  Comment: Impression  CONCLUSION:  1. Soft tissue swelling of the right lower leg without radiographic evidence of underlying osseous injury.     2. Right calcaneal enthesopathy.            MDM     69 yr old male here for evaluation of sensitivity, warmth, discomfort to right lower leg where he has a hematoma from fall.     ON exam, pt well appearing. Breathing easy I no resp distress. Low grade fever noted.  Right Lower leg swelling and warmth noted. Healing ecchymosis to calf, toes, and base of feet noted. Pedal pulse normal. Cap refill normal. Sensation intact. Full ROM to toes, ankle and knee. Nontender foot, ankle, tib/fib, knee. Tenderness to mid calf only. Effusion noted to knee.     Differential diagnoses reflecting the complexity of care include but are not limited to DVT, cellulitis, healing hematoma, leg fracture, knee fracture, dislocation.    Comorbidities that add complexity to management include: HLD, OA  History obtained by an independent source was from: patient, wife  My independent interpretations of studies include:   XR knee- OA, no acute injury  XR tib/fib-= soft tissue swelling, no acute injury, right calcaneal enthesopathy    Shared decision making was done by: patient, wife, myself  Discussions of management was done with: patient, wife    Discussed timeline of falls with pt and wife. He reports falling 1st around Xmas time, then again a day or so prior to going to ED. Most often traumatic DVT will show 1-2 weeks after injury so likely the   performed in ER is sufficient based on this.     Patient is well appearing, non-toxic and in no acute distress.  Vital signs are stable.   Discussed likely still healing hematoma causing some discomfort. Due to warmth, low grade fever> will treat as cellulitis today. Keflex x 10 days to pharm on file.  Advised on treatment of hematoma as well, ICE, elevation, compression with ace wrap or compression stockings tre if flying.  He has appt with PCP on Wednesday before flying again. Discussed keeping this appt for re-evaluation and may want repeat US doppler if this continues.    All questions answered. Return and ER precautions given.    Counseled: Patient, regarding diagnosis, regarding treatment plan, regarding diagnostic results, regarding prescription, I have discussed with the patient the results of tests, differential diagnosis, and warning signs and symptoms that should prompt immediate return. The patient understands these instructions and agrees to the follow-up plan provided. There is no barriers to learning. Appropriate f/u given. Patient agrees to return for any concerns/ problems/complications.          Medical Decision Making      Disposition and Plan     Clinical Impression:  1. Pain and swelling of right lower leg    2. Cellulitis of right lower extremity         Disposition:  Discharge  1/18/2025 10:44 am    Follow-up:  Amaris Henley MD  [address] [place] [address] [id number] [phone]    Go on 1/22/2025            Medications Prescribed:  Discharge Medication List as of 1/18/2025 10:44 AM        START taking these medications    Details   cephALEXin 500 MG Oral Cap Take 1 capsule (500 mg total) by mouth 4 (four) times daily for 10 days., Normal, Disp-40 capsule, R-0                 Supplementary Documentation:

## 2025-01-18 NOTE — DISCHARGE INSTRUCTIONS
Ace wrap around leg or compression stockings daily to help with inflammation and swelling, specially with flying    Take ibuprofen 600 or Tylenol 1000 every 6-8 hours if needed for pain  Continue alternating ice and heat 15 minutes on 2 hours off as needed for swelling pain and bruising.    Take Keflex 4 times a day for 10 days.  Finish full course.  This is to help with inflammation and infection of skin possibly starting from injury that occurred a few weeks ago.    Keep your appointment for follow-up on Wednesday for reevaluation and to make sure your leg is improving.

## 2025-01-22 NOTE — PROGRESS NOTES
HPI:    Patient ID: ANGELA Malhotra is a 69 year old male.    HPI  Pt presenting to establish care. Previously seen by Dr. Garcia.    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  Pertinent positives and negatives noted in the the HPI.       Current Outpatient Medications   Medication Sig Dispense Refill    atorvastatin 10 MG Oral Tab Take 1 tablet (10 mg total) by mouth nightly. 90 tablet 3    ketoconazole 2 % External Cream Apply to affected area 2 times daily.to  feet and thighs 60 g 3    Betamethasone Dipropionate Aug 0.05 % External Cream APPLY TO RASH ON NECK TWICE DAILY AS NEEDED 50 g 1    ergocalciferol 1.25 MG (81100 UT) Oral Cap Take 1 capsule (50,000 Units total) by mouth once a week. (Patient not taking: Reported on 11/4/2024) 12 capsule 1     Allergies:Allergies[1]   Vitals:    01/22/25 0806   BP: 133/77   Pulse: 67   Temp: 98 °F (36.7 °C)   TempSrc: Temporal   Weight: 223 lb 12.8 oz (101.5 kg)   Height: 6' (1.829 m)       Body mass index is 30.35 kg/m².   PHYSICAL EXAM:   Physical Exam  Vitals reviewed.   Constitutional:       General: He is not in acute distress.     Appearance: Normal appearance.   HENT:      Head: Normocephalic and atraumatic.      Right Ear: Tympanic membrane, ear canal and external ear normal.      Left Ear: Tympanic membrane, ear canal and external ear normal.   Eyes:      Conjunctiva/sclera: Conjunctivae normal.   Cardiovascular:      Rate  and Rhythm: Normal rate and regular rhythm.      Heart sounds: Normal heart sounds, S1 normal and S2 normal. No murmur heard.  Pulmonary:      Effort: Pulmonary effort is normal. No respiratory distress.      Breath sounds: Normal breath sounds. No wheezing, rhonchi or rales.   Abdominal:      General: Bowel sounds are normal.      Palpations: Abdomen is soft.      Tenderness: There is no abdominal tenderness. There is no guarding or rebound.   Musculoskeletal:      Cervical back: Normal range of motion and neck supple. No muscular tenderness.      Right lower leg: Edema (mild, improved with evolving ecchymosis) present.   Lymphadenopathy:      Cervical: No cervical adenopathy.   Skin:     General: Skin is warm.      Coloration: Skin is not jaundiced.   Neurological:      General: No focal deficit present.      Mental Status: He is alert and oriented to person, place, and time. Mental status is at baseline.   Psychiatric:         Attention and Perception: Attention normal.         Mood and Affect: Mood normal.         Behavior: Behavior normal. Behavior is cooperative.         Cognition and Memory: Cognition normal.               ASSESSMENT/PLAN:   1. Encounter for vision screening  Follow-up with Ophtho  - Ophthalmology Referral - In Network    2. Immunization due  - High Dose Fluzone trivalent influenza, 65yrs+ PFS (58986)    3. Diverticulosis of colon without diverticulitis  Last Cscope 3/2021, 5yr recall  Pt explains that he will be moving Fall 2025, may benefit from early screening prior to moving to [facility]  - Gastro GI Telephone Colon Screen; Future    4. Encounter for audiology evaluation  Follow-up with Audiology  - Audiology Referral - In Network    5. Vitamin D deficiency  Will check level    6. Screening for endocrine, nutritional, metabolic and immunity disorder  - discussed healthy diet and lifestyle changes for overall wellness, which includes decreased carb and sugar intake, increased fiber  intake, and increased water intake as tolerated, as well as regular exercise  - TSH W Reflex To Free T4; Future  - Comp Metabolic Panel (14); Future  - Lipid Panel; Future  - Vitamin D; Future  - CBC With Differential With Platelet; Future  - Urinalysis, Routine; Future  - PSA Total, Screen; Future    7. Plantar fasciitis  Follow-up with Podiatry  - Podiatry Referral - In Network    8. Hematoma of right lower leg  Improving  Continue abx  Continue compression support  - discussed red flags for urgent reevaluation  - to call with any questions/concerns    Pt verbalized understanding and agrees with plan.      Orders Placed This Encounter   Procedures    TSH W Reflex To Free T4    Comp Metabolic Panel (14)    Lipid Panel    Vitamin D    CBC With Differential With Platelet    Urinalysis, Routine    PSA Total, Screen    High Dose Fluzone trivalent influenza, 65yrs+ PFS (70387)       Meds This Visit:  Requested Prescriptions      No prescriptions requested or ordered in this encounter       Imaging & Referrals:  OPHTHALMOLOGY - INTERNAL  OP REFERRAL TO AUDIOLOGY  PODIATRY - INTERNAL  INFLUENZA VAC HIGH DOSE PRSV FREE  OP REFERRAL TO [facility] hospital GI TELEPHONE COLON SCREEN         ID#[id number]         [1]   Allergies  Allergen Reactions    Penicillins UNKNOWN     Unable to remember reactions

## 2025-01-24 NOTE — TELEPHONE ENCOUNTER
Patient states that he will be leaving [place] due to moving to [place].   His colonoscopy  recall states that he is due in March 2026 but he would like to schedule a Colonoscopy this year before he leaves in the summer.  Please call

## 2025-02-06 NOTE — TELEPHONE ENCOUNTER
Called and spoke to the patient, date of birth and name verified.    He wants to discuss colonoscopy with MD.    Your Appointments        Monday March 24, 2025 8:40 AM  Follow Up Visit with Quinn Plascencia MD  [facility] [address]  [facility] [address]  [phone]

## 2025-02-19 NOTE — TELEPHONE ENCOUNTER
Refill Request for medication(s):   Betamethasone Dipropionate Aug 0.05 % External Cream     Last Office Visit:  11/04/24    Last Refill: 01/30/24    Pharmacy, Dosage verified:   [id number] - [place], [place] - [address] AT [facility], [phone], [phone]       Condition Update (if applicable): Pierce chaneyg sent    Rx pended and sent to provider for approval, please advise. Thank You!

## 2025-02-27 NOTE — TELEPHONE ENCOUNTER
Please review;  [facility] protocol failed/ No protocol     Message sent to patient to have outstanding labs done    Requested Prescriptions   Pending Prescriptions Disp Refills    ATORVASTATIN 10 MG Oral Tab [Pharmacy Med Name: ATORVASTATIN 10MG TABLETS] 90 tablet 3     Sig: TAKE 1 TABLET(10 MG) BY MOUTH EVERY NIGHT       Cholesterol Medication Protocol Failed - 2/27/2025 10:38 AM        Failed - ALT < 80     Lab Results   Component Value Date    ALT 31 07/14/2023             Failed - ALT resulted within past year        Failed - Lipid panel within past 12 months     Lab Results   Component Value Date    CHOLEST 170 12/09/2021    TRIG 112 12/09/2021    HDL 62 (H) 12/09/2021    LDL 86 12/09/2021    VLDL 22 12/09/2021    NONHDLC 108 12/09/2021             Passed - In person appointment or virtual visit in the past 12 mos or appointment in next 3 mos     Recent Outpatient Visits              1 month ago Encounter for vision screening    [facility] Amaris Henley MD    Office Visit    3 months ago Multiple nevi    [facility] Oumou Navarro MD    Office Visit    5 months ago Annual physical exam    [facility] Sussy Garcia MD    Office Visit    7 months ago Dyslipidemia    [facility] Micaela Weston APRN    Telemedicine    9 months ago     [facility] Rossy Small PT    Office Visit          Future Appointments         Provider Department Appt Notes    In 2 weeks Oumou Navarro MD [facility] follow up    In 2 weeks Monika Anthony Au.D [facility] HT    In 3 weeks Quinn Plascencia MD [facility] dst/ discuss colonoscopy                     Passed - Medication is active on med list           Future Appointments         Provider Department Appt Notes    In 2 weeks Oumou Navarro MD [facility] follow up    In 2 weeks Monika Anthony Au.D [facility] HT    In 3 weeks Quinn Plascencia MD [facility] dst/ discuss colonoscopy          Recent Outpatient Visits              1 month ago Encounter for vision screening    [facility] [place]    Office Visit    3 months ago Multiple nevi    [facility] Oumou Navarro MD    Office Visit    5 months ago Annual physical exam    [facility] Sussy Garcia MD    Office Visit    7 months ago Dyslipidemia    [facility] [place] Micaela Weston APRN    Telemedicine    9 months ago     [facility] Rossy Small PT    Office Visit

## 2025-03-05 NOTE — TELEPHONE ENCOUNTER
please see patient Mychart message below. I have pended a referral for your review and approval. Thank you

## 2025-03-07 NOTE — TELEPHONE ENCOUNTER
Referral placed for below provider     _______________________________________________________  Referred to Provider Information:  Provider Address Phone   Taisha Higginbotham [address] [facility] [address] [address] [phone]

## 2025-03-14 NOTE — PROGRESS NOTES
The following individual(s) verbally consented to be recorded using ambient AI listening technology and understand that they can each withdraw their consent to this listening technology at any point by asking the clinician to turn off or pause the recording:    Patient name: ANGELA Malhotra

## 2025-03-23 NOTE — PROGRESS NOTES
ANGELA Malhotra is a 69 year old male.  HPI:     CC:    Chief Complaint   Patient presents with    Full Skin Exam     LOV 11/2024. Pt present for full body skin exam Personal Hx of Melanoma in situ on the Left arm (exicsion 01/22) and dysplastic nevus Left lower leg (04/22), and Aks. Family hx of skin cancer.          Allergies:  Penicillins    HISTORY:    Past Medical History:    Colitis    1981    Dysplastic nevus    Left lower leg    High cholesterol    History of blood transfusion    no reactions    Melanoma in situ (HCC)    Left upper arm    Onychomycosis    Osteoarthritis    Other and unspecified hyperlipidemia    Plantar fasciitis    - Orthotics    Thoracic or lumbosacral neuritis or radiculitis, unspecified    Tinea    Visual impairment    readers      Past Surgical History:   Procedure Laterality Date    Colonoscopy      Colonoscopy N/A 3/8/2021    Procedure: COLONOSCOPY;  Surgeon: Quinn Plascencia MD;  Location: [facility] ENDOSCOPY    Tonsillectomy        Family History   Problem Relation Age of Onset    Stroke Mother         CVA; Cause of death    Macular degeneration Mother     Diabetes Father     Prostate Cancer Father 67    Heart Attack Father       Social History     Socioeconomic History    Marital status:    Tobacco Use    Smoking status: Never     Passive exposure: Never    Smokeless tobacco: Never   Vaping Use    Vaping status: Never Used   Substance and Sexual Activity    Alcohol use: Yes     Alcohol/week: 1.0 - 2.0 standard drink of alcohol     Types: 1 - 2 Shots of liquor per week     Comment: 1-2 drinks weekly    Drug use: No    Sexual activity: Not Currently   Other Topics Concern    Caffeine Concern Yes     Comment: Coffee, 3 cups daily;     Grew up on a farm No    History of tanning Yes    Outdoor occupation No    Reaction to local anesthetic No    Pt has a pacemaker No    Pt has a defibrillator No        Current Outpatient Medications   Medication Sig Dispense Refill    latanoprost  0.005 % Ophthalmic Solution       atorvastatin 10 MG Oral Tab Take 1 tablet (10 mg total) by mouth nightly. 90 tablet 3    BETAMETHASONE DIPROPIONATE AUG 0.05 % External Cream APPLY TO RASH ON NECK TWICE DAILY AS NEEDED 50 g 1    ergocalciferol 1.25 MG (14004 UT) Oral Cap Take 1 capsule (50,000 Units total) by mouth once a week. 12 capsule 1    ketoconazole 2 % External Cream Apply to affected area 2 times daily.to  feet and thighs 60 g 3     Allergies:   Allergies   Allergen Reactions    Penicillins UNKNOWN     Unable to remember reactions       Past Medical History:    Colitis    1981    Dysplastic nevus    Left lower leg    High cholesterol    History of blood transfusion    no reactions    Melanoma in situ (HCC)    Left upper arm    Onychomycosis    Osteoarthritis    Other and unspecified hyperlipidemia    Plantar fasciitis    - Orthotics    Thoracic or lumbosacral neuritis or radiculitis, unspecified    Tinea    Visual impairment    readers     Past Surgical History:   Procedure Laterality Date    Colonoscopy      Colonoscopy N/A 3/8/2021    Procedure: COLONOSCOPY;  Surgeon: Quinn Plascencia MD;  Location: [facility] ENDOSCOPY    Tonsillectomy       Social History     Socioeconomic History    Marital status:      Spouse name: Not on file    Number of children: Not on file    Years of education: Not on file    Highest education level: Not on file   Occupational History    Not on file   Tobacco Use    Smoking status: Never     Passive exposure: Never    Smokeless tobacco: Never   Vaping Use    Vaping status: Never Used   Substance and Sexual Activity    Alcohol use: Yes     Alcohol/week: 1.0 - 2.0 standard drink of alcohol     Types: 1 - 2 Shots of liquor per week     Comment: 1-2 drinks weekly    Drug use: No    Sexual activity: Not Currently   Other Topics Concern     Service Not Asked    Blood Transfusions Not Asked    Caffeine Concern Yes     Comment: Coffee, 3 cups daily;     Occupational  Exposure Not Asked    Hobby Hazards Not Asked    Sleep Concern Not Asked    Stress Concern Not Asked    Weight Concern Not Asked    Special Diet Not Asked    Back Care Not Asked    Exercise Not Asked    Bike Helmet Not Asked    Seat Belt Not Asked    Self-Exams Not Asked    Grew up on a farm No    History of tanning Yes    Outdoor occupation No    Reaction to local anesthetic No    Pt has a pacemaker No    Pt has a defibrillator No   Social History Narrative    Not on file     Social Drivers of Health     Food Insecurity: Not on file   Transportation Needs: Not on file   Stress: Not on file   Housing Stability: Not on file     Family History   Problem Relation Age of Onset    Stroke Mother         CVA; Cause of death    Macular degeneration Mother     Diabetes Father     Prostate Cancer Father 67    Heart Attack Father        There were no vitals filed for this visit.    HPI:    Chief Complaint   Patient presents with    Full Skin Exam     LOV 11/2024. Pt present for full body skin exam Personal Hx of Melanoma in situ on the Left arm (exicsion 01/22) and dysplastic nevus Left lower leg (04/22), and Aks. Family hx of skin cancer.      Patient for follow-up  For skin exam  History of melanoma in situ, dysplastic nevi melanoma type II 4/8/2022     melanoma in situ post wide excision left upper arm, history of AK's    Family history of skin cancer,     patient for follow-up.    History of Present Illness  ANGELA Malhotra is a 69 year old male who presents with skin concerns, including wisdom spots and cracked feet.    He spots on his face, which improve with the use of creams. He attributes some of the skin issues to wearing a collar, which may cause irritation.    He experiences cracked feet, which he believes may be fungal in nature. He uses antifungal creams like Lotronex and Lamisil, and has ketoconazole cream for both his neck and feet. His feet crack if not moisturized, and he receives monthly pedicures to manage  dead skin.    There is a family history of similar skin issues, with his mother and uncles experiencing skin buildup and cracking.          Past notes/ records and appropriate/relevant lab results including pathology and past body maps reviewed. Including outside notes/ PCP notes as appropriate. Updated and new information noted in current visit.     Patient presents with concerns above.    Patient has been in their usual state of health.      History, medications, allergies reviewed as noted.      ROS:  new relevant systemic complaints as noted       Physical Examination:     Well-developed well-nourished patient alert oriented in no acute distress.  Exam total-body performed, including scalp, head, neck, face,nails, hair, external eyes, including conjunctival mucosa, eyelids, lips external ears, back, chest,/ breasts, axillae,  abdomen, arms, legs, palms.     Multiple light to medium brown, well marginated, uniformly pigmented, macules and papules 6 mm and less scattered on exam. pigmented lesions examined with dermoscopy benign-appearing patterns.     Waxy tannish keratotic papules scattered, cherry-red vascular papules scattered.    See map today's date for lesions noted .      Otherwise remarkable for lesions as noted on map.  See details of examination  See Assessment /Plan for additional history and physical exam also:    Assessment / plan:    No orders of the defined types were placed in this encounter.      Meds & Refills for this Visit:  Requested Prescriptions      No prescriptions requested or ordered in this encounter         Encounter Diagnoses   Name Primary?    AK (actinic keratosis) Yes    Atypical nevi     Multiple nevi     Lentigo     SK (seborrheic keratosis)     Encounter for surveillance of abnormal nevi     Encounter for follow-up surveillance of melanoma     Benign neoplasm of skin, unspecified location     Dermatitis     Rosacea     Pruritus        See details on map.      Remarkable  for:  Patient seen for follow-up long-term monitoring, treatment of  Atypical nevi, sun damage AK's, history of melanoma.  High risk patient.  Other skin conditions  Plan of care:  ongoing surveillance, monitoring including regular follow-up due to longer term risk of recurrence, new lesions.  See previous notes.  There is a longitudinal care relationship with me, the care plan reflects the ongoing nature of the continuous relationship of care, and the medical record indicates that there is ongoing treatment of a serious/complex medical condition which I am currently managing.  is Applicable    Plan is to continue monitoring every 4 to 6 months or as needed        Physical Exam        Results        Assessment & Plan  Actinic keratosis  Actinic keratosis managed with topical treatments, improving with creams. Condition related to sun damage, exacerbated by driving with the top down despite wearing a hat.  - Continue prescribed topical treatments as needed.  - Encourage regular use of sunscreen and protective clothing to prevent further sun damage.    Chronic dry skin with hereditary keratoderma  Chronic dry skin and cracking, particularly on the feet, likely hereditary. Family history of similar skin issues, common in ethnicities like Swedes and Norwegians. Managed with regular pedicures and creams.  - Provide samples of urea-based creams for exfoliation and hydration.  - Encourage regular moisturizing to prevent cracking and discomfort.    Tinea pedis  Cracking of the feet likely due to tinea pedis. Previously effective ketoconazole cream not used regularly on feet.  - Continue ketoconazole cream on the feet as needed.  - Provide samples of urea-based creams for exfoliation and hydration of the feet.    Follow-up  Insurance coverage changes may affect ability to continue care. Transitioning from HMO to O, dependent on network coverage.  - Schedule follow-up appointment for June or July to reassess skin  conditions and treatment efficacy.    Recording duration: 9 minutes      History of inflamed acneiform nodules, boils continue clindamycin gel as needed oral antibiotics if worsening to let us know is doing    Dermatitis stable overall doing well continue triamcinolone, Diprolene cream for more severe flare dermatitis.  Overall improved.  Continue regular monitoring meds in grid.  Skin care instructions reviewed.  [place] use of emollients.  Pathophysiology reviewed.  Consider Contac allergy in differential.  Consider patch testing.  Patient will let us know how they are doing over the next several weeks.  Await clinical response to above therapies.    pt with history of MIS left arm post wide excision. 1/22 Doing well.  Patient at high risk given family history skin cancer grew up in [place] was in the sun excessively.  Lived in [place] for a number of years.  Patient will follow-up every 3 to 4 months for routine skin check.  At risk for melanoma and nonmelanoma skin cancers.    Mildly atypical compound nevus left lower leg4/22  No recurrence.  Monitor carefully  No new clinically atypical pigmented lesions on dermoscopy all benign patterns.  Continue careful monitoring    With history melanoma in situ and atypical nevi as well as history of sun damage follow-up every 3 to 4 months.  Continue careful follow-up      Actinic Keratoses.  Precancerous nature discussed. Sun protection, sunscreen/ blocks encouraged .  Monitoring for new lesions.  Sun damage additional recurrent and new actinic keratoses, skin cancers may occur in areas of prior actinic keratoses, related to past sun exposure to minimize current sun exposure.  Sunscreen applied consistently regularly, reapplication and sun protection while driving recommended.    Erythema fine papules telangiectasia of the nose and cheeks continue sun protection MetroGel nightly encouraged more consistent use more erythema noted.  Has not been using this as frequently as  ideal stress at work.  Looking forward to [employer] in the next several years.  .Rosacea.  Meds in grid.  Skin care instructions reviewed.  Pathophysiology reviewed.  Chronic recurrent nature discussed.  Patient will let us know how they are doing over the next several weeks.  Await clinical response to above therapy.   Darker brown macule lower back observe unchanged small nevi over the legs arms back observe carefully consistent with junctional nevi.  Monitor    Erythema scaling over the plantar feet onychomycotic changes noted.  Keratotic changes we will add topical antifungal, Kerydin overall improving continue current regimen stable improving    History of tinea ketoconazole as needed, onychomycosis longstanding over-the-counter spray, consider additional topicals.  Not particular bothersome    Irritated tag-like area reported perianal inflamed intermittently follow-up with Dr. Mcbride for this.    No other susupicious lesions on todays  exam.    please refer to map for specific lesions.  See additional diagnoses.  Pros cons of various therapies, risks benefits discussed.Pathophysiology discussed with patient.  Therapeutic options reviewed.  See  Medications in grid.  Instructions reviewed at length.    Benign nevi, seborrheic  keratoses, cherry angiomas:  Reassurance regarding other benign skin lesions.    Monitor for new or changing lesions. Signs and symptoms of skin cancer, ABCDE's of melanoma ( additional information available at AAD.org, skincancer.org) Encourage Sunscreen (broad-spectrum, ideally mineral-based-UVA/UVB -SPF 30 or higher) use encouraged, sun protection/sun protective clothing, self exams reviewed Followup as noted RTC ---routine checkup 6 mos -one year or p.r.n.    Encounter Times   Including precharting, reviewing chart, prior notes obtaining history: 10 minutes, medical exam :10 minutes, notes on body map, plan, counseling 10minutes My total time spent caring for the patient on the day of  the encounter: 30 minutes     The patient indicates understanding of these issues and agrees to the plan.  The patient is asked to return as noted in follow-up/ above.    This note was generated using Dragon voice recognition software.  Please contact me regarding any confusion resulting from errors in recognition..  Note to patient and family: The 21st Century Cures Act makes medical notes like these available to patients. However, be advised this is a medical document. It is intended as fpoh-ps-pytu communication and monitoring of a patient's care needs. It is written in medical language and may contain abbreviations or verbiage that are unfamiliar. It may appear blunt or direct. Medical documents are intended to carry relevant information, facts as evident and the clinical opinion of the practitioner.

## 2025-03-24 NOTE — TELEPHONE ENCOUNTER
Patient was seen in office today (3/24/2025) by Dr Plascencia. Provided patient with office number and prep instructions. Reviewed prep instructions with patient in office, verbalized understanding. Patient aware GI schedulers will call patient to schedule the procedure.      Procedure orders:    Schedule: Colonoscopy   Diagnosis:     ICD-10-CM   1. History of colon polyps  Z86.0100      Sedation: MAC   Prep: Split TriLyte    Medication changes prior to procedure:   None

## 2025-03-24 NOTE — PROGRESS NOTES
Subjective:   Patient ID: ANGELA Malhotra is a 69 year old male.    THERESA Ayala returns in follow-up today with his partner, Vera.  He was last seen at colonoscopy in March 2021.    As per previous notes Jamie is an [facility] [employer] who has a history of adenomatous colon polyps including an advanced adenoma in the left colon based on size that required multiple sessions of endoscopic polypectomy (over 10 years prior).  His last colonoscopy 3 years prior revealed a solitary subcentimeter sessile serrated adenoma.  The descending colon polypectomy site revealed no sign of polyp recurrence.  A 5-year surveillance colonoscopy was advised (March 2026).    Current history:  Jamie has been well.  He will be retiring from the [employer] this year.  His appetite is good.  His weight is stable.  He knows that he needs to lose weight.  He denies dysphagia or reflux symptoms.  He has occasional indigestion after eating Mexican food.  He denies abdominal pain.  Stools are occasionally runny, however, otherwise regular.  He has noted no bleeding.    Subjective wellbeing and general health is good.    History/Other:   Review of Systems  See above    Wt Readings from Last 7 Encounters:   03/24/25 231 lb (104.8 kg)   01/22/25 223 lb 12.8 oz (101.5 kg)   01/07/25 220 lb (99.8 kg)   09/09/24 234 lb 3.2 oz (106.2 kg)   07/10/24 235 lb (106.6 kg)   05/09/24 229 lb 3.2 oz (104 kg)   03/11/24 230 lb 6.4 oz (104.5 kg)       Current Outpatient Medications   Medication Sig Dispense Refill    PEG 3350-KCl-Na Bicarb-NaCl (TRILYTE) 420 g Oral Recon Soln Take 4,000 mL (4 L total) by mouth one time for 1 dose. 4000 mL 0    latanoprost 0.005 % Ophthalmic Solution       atorvastatin 10 MG Oral Tab Take 1 tablet (10 mg total) by mouth nightly. 90 tablet 3    BETAMETHASONE DIPROPIONATE AUG 0.05 % External Cream APPLY TO RASH ON NECK TWICE DAILY AS NEEDED 50 g 1    ergocalciferol 1.25 MG (33923 UT) Oral Cap Take 1 capsule (50,000 Units  total) by mouth once a week. 12 capsule 1    ketoconazole 2 % External Cream Apply to affected area 2 times daily.to  feet and thighs 60 g 3     Allergies:Allergies[1]    Objective:   Physical Exam  Vitals and nursing note reviewed.   Constitutional:       General: He is not in acute distress.     Appearance: He is well-developed. He is not ill-appearing, toxic-appearing or diaphoretic.   HENT:      Mouth/Throat:      Pharynx: No oropharyngeal exudate.   Eyes:      General: No scleral icterus.     Conjunctiva/sclera: Conjunctivae normal.   Neck:      Thyroid: No thyromegaly.   Cardiovascular:      Rate and Rhythm: Normal rate and regular rhythm.      Heart sounds: Normal heart sounds. No murmur heard.  Pulmonary:      Effort: Pulmonary effort is normal. No respiratory distress.      Breath sounds: Normal breath sounds. No wheezing or rales.   Abdominal:      General: Bowel sounds are normal. There is no distension.      Palpations: Abdomen is soft. There is no mass.      Tenderness: There is no abdominal tenderness. There is no guarding or rebound.   Musculoskeletal:      Cervical back: Neck supple.   Lymphadenopathy:      Cervical: No cervical adenopathy.   Neurological:      Mental Status: He is alert and oriented to person, place, and time.   Psychiatric:         Behavior: Behavior normal.         Assessment & Plan:   1. History of colon polyps    Other than occasional indigestion and loose stools, Jamie is asymptomatic from a lower gastrointestinal tract standpoint.  He has a history of multiple adenomatous colon polyps including a subtle advanced adenoma based on size removed endoscopically over 10 years prior.  His last colonoscopy was over 4 years prior.  We have discussed a surveillance examination this year and have elected to proceed.  The colonoscopy will be scheduled following a split dose TriLyte preparation and monitored anesthesia care.  Timing of subsequent surveillance to be based on colonoscopy  findings and functional status/comorbidities.        Meds This Visit:  Requested Prescriptions     Signed Prescriptions Disp Refills    PEG 3350-KCl-Na Bicarb-NaCl (TRILYTE) 420 g Oral Recon Soln 4000 mL 0     Sig: Take 4,000 mL (4 L total) by mouth one time for 1 dose.       Imaging & Referrals:  None         [1]   Allergies  Allergen Reactions    Penicillins UNKNOWN     Unable to remember reactions

## 2025-03-24 NOTE — PATIENT INSTRUCTIONS
Schedule colonoscopy for a personal history of colon polyps following a split dose TriLyte preparation and monitored anesthesia care.

## 2025-03-24 NOTE — TELEPHONE ENCOUNTER
Scheduled for:  Colonoscopy 21157  Provider Name:  Dr. Plascencia  Date:  6/9/2025  Location:  [facility]  Sedation:  MAC  Time:  3:15pm  Prep:  Trilyte  Meds/Allergies Reconciled?:  Physician reviewed     Diagnosis with codes:  Hx of colon polyps Z86.010  Was patient informed to call insurance with codes (Y/N):  Yes     Referral sent?:  Referral was sent at the time of electronic surgical scheduling.   EM or [facility] notified?:  I sent an electronic request to Endo Scheduling and received a confirmation today.      Medication Orders:  n/a  Misc Orders:     n/a  Further instructions given by staff:   Patient states received instructions in office

## 2025-04-01 NOTE — TELEPHONE ENCOUNTER
Dr Henley,     Patient called requesting referral to Dr. Dobbs.    Pended referral please review diagnosis and sign off if you agree.    Thank you.  Blaire Ugarte  [facility]

## 2025-05-19 NOTE — TELEPHONE ENCOUNTER
Spoke with patient, Date of Birth verified  He is looking for information regarding ENT/ Audiology he saw before.   Pt was provided requested information.   He stated understanding.   See referral 4-23-25.      TEODORA

## 2025-06-09 NOTE — H&P
History & Physical Examination    Patient Name: ANGELA Malhotra  MRN: [id number]  [facility]: [id number]  YOB: 1955    Diagnosis: Personal history of adenomatous colon polyps      Prescriptions Prior to Admission[1]  Current Hospital Medications[2]    Allergies: Allergies[3]    Past Medical History[4]  Past Surgical History[5]  Family History[6]  Social History     Tobacco Use    Smoking status: Never     Passive exposure: Never    Smokeless tobacco: Never   Substance Use Topics    Alcohol use: Yes     Alcohol/week: 1.0 - 2.0 standard drink of alcohol     Types: 1 - 2 Shots of liquor per week     Comment: 1-2 drinks weekly       SYSTEM Check if Review is Normal Check if Physical Exam is Normal If not normal, please explain:   HEENT [X ] [ X]    NECK  [X ] [ X]    HEART [X ] [ X]    LUNGS [X ] [ X]    ABDOMEN [X ] [ X]    EXTREMITIES [X ] [ X]    OTHER        [ x ] I have discussed the risks and benefits and alternatives with the patient/family.  They understand and agree to proceed with plan of care.  [ x ] I have reviewed the History and Physical done within the last 30 days.  Any changes noted above.    Quinn Plascencia MD  2025  3:21 PM             [1]   Medications Prior to Admission   Medication Sig Dispense Refill Last Dose/Taking    atorvastatin 10 MG Oral Tab Take 1 tablet (10 mg total) by mouth nightly. 90 tablet 3 2025    [] PEG 3350-KCl-Na Bicarb-NaCl (TRILYTE) 420 g Oral Recon Soln Take 4,000 mL (4 L total) by mouth one time for 1 dose. 4000 mL 0     latanoprost 0.005 % Ophthalmic Solution  (Patient not taking: Reported on 2025)   Not Taking    BETAMETHASONE DIPROPIONATE AUG 0.05 % External Cream APPLY TO RASH ON NECK TWICE DAILY AS NEEDED 50 g 1     ketoconazole 2 % External Cream Apply to affected area 2 times daily.to  feet and thighs 60 g 3    [2]   Current Facility-Administered Medications   Medication Dose Route Frequency    lactated ringers infusion   Intravenous  Continuous   [3]   Allergies  Allergen Reactions    Penicillins UNKNOWN     Unable to remember reactions   [4]   Past Medical History:   Colitis    1981    Dysplastic nevus    Left lower leg    High cholesterol    History of blood transfusion    no reactions    Melanoma in situ (HCC)    Left upper arm    Onychomycosis    Osteoarthritis    Other and unspecified hyperlipidemia    Plantar fasciitis    - Orthotics    Thoracic or lumbosacral neuritis or radiculitis, unspecified    Tinea    Visual impairment    readers   [5]   Past Surgical History:  Procedure Laterality Date    Colonoscopy      Colonoscopy N/A 3/8/2021    Procedure: COLONOSCOPY;  Surgeon: Quinn Plascencia MD;  [facility]: [facility] ENDOSCOPY    Tonsillectomy     [6]   Family History  Problem Relation Age of Onset    Stroke Mother         CVA; Cause of death    Macular degeneration Mother     Diabetes Father     Prostate Cancer Father 67    Heart Attack Father

## 2025-06-09 NOTE — OPERATIVE REPORT
[facility] Endoscopy Report      Date of Procedure:  06/09/25      Preoperative Diagnosis:  Personal history of adenomatous colon polyps      Postoperative Diagnosis:  1.  Colon polyps  2.  Pancolonic diverticulosis      Procedure:    Colonoscopy with polypectomy      Surgeon:  Quinn Plascencia M.D.      Anesthesia:  Monitored anesthesia care  Cecal withdrawal time: 24 minutes  EBL:  Insignificant      Brief History:  This is a 69 year old male who presents for surveillance colonoscopy in the setting of a history of an multiple sessions of polypectomy.  The patient's last colonoscopy was 4 years prior with removal of a solitary subcentimeter sessile serrated adenoma.  He has been asymptomatic from a lower gastrointestinal tract      Technique:  After informed consent, the patient was placed in the left lateral recumbent position.  Digital rectal examination revealed no palpable intraluminal abnormalities.  An Olympus variable stiffness 190 series HD colonoscope was inserted into the rectum and advanced under direct vision by following the lumen to the terminal ileum.  The colon was examined upon withdrawal in the left lateral recumbent position.      Findings:  The preparation of the colon was good.  The terminal ileum was examined for 5 cm and visually normal.  The ileocecal valve was well preserved. The visualized colonic mucosa from the cecum to the anal verge was normal with an intact vascular pattern.  There were #5 polyps seen within the colon which were removed as follows:    1.  In the transverse colon there were #4 polyps measuring 3-6 mm in size.  These were cold snare excised and retrieved.  2.  In the proximal sigmoid colon there was a 4 mm sessile polyp which was cold snare excised and retrieved.    Inspection of all sites revealed no evidence of ongoing bleeding.  There were scattered diverticula seen throughout the colon without current signs of complication.  At 55 cm upon  withdrawal the site of prior polypectomy was identified by areas of white scar deformity and an endoscopically placed tattoo without signs of recurrent polyp.  There were no other colonic polyps, mass lesions, vascular anomalies or signs of inflammation seen.  Retroflexion in the rectum revealed no abnormalities.  The procedure was well tolerated without immediate complication.      Impression:  1.  Colon polyps  2.  Uncomplicated pancolonic diverticulosis    Recommendations:  1.  High-fiber diet.  2.  Follow-up biopsy results.  Polyp histology to determine recommendations regarding follow-up.        Quinn Plascencia MD  6/9/2025

## 2025-06-09 NOTE — DISCHARGE INSTRUCTIONS
Home Care Instructions for Colonoscopy with Sedation    Diet:  - Resume your regular diet as tolerated unless otherwise instructed.  - Start with light meals to minimize bloating.  - Do not drink alcohol today.    Medication:  - If you have questions about resuming your normal medications, please contact your Primary Care Physician.    Activities:  - Take it easy today. Do not return to work today.  - Do not drive today.  - Do not operate any machinery today (including kitchen equipment).  -   Do not make any critical decisions or sign any paperwork.  - Do not exercise today.    Colonoscopy:  - You may notice some rectal \"spotting\" (a little blood on the toilet tissue) for a day or two after the exam. This is normal.  - If you experience any rectal bleeding (not spotting), persistent tenderness or sharp severe abdominal pains, oral temperature over 100 degrees Fahrenheit, light-headedness or dizziness, or any other problems, contact your doctor.      **If unable to reach your doctor, please go to the [facility] Emergency Room**    - Your referring physician will receive a full report of your examination.  - If you do not hear from your doctor's office within two weeks of your biopsy, please call them for your results.    You may be able to see your laboratory results in [employer] between 4 and 7 business days.  In some cases, your physician may not have viewed the results before they are released to [email or web address] [id number]€[id number].  If you have questions regarding your results contact the physician who ordered the test/exam by phone or via [email or web address] [id number]€˜ by choosing \"Ask a Medical Question.\"

## 2025-06-09 NOTE — ANESTHESIA PREPROCEDURE EVALUATION
Anesthesia PreOp Note    HPI:     ANGELA Malhotra is a 69 year old male who presents for preoperative consultation requested by: Quinn Plascencia MD    Date of Surgery: 6/9/2025    Procedure(s):  COLONOSCOPY  Indication: History of colon polyps    Relevant Problems   No relevant active problems       NPO:                         History Review:  Patient Active Problem List    Diagnosis Date Noted    Dyslipidemia 07/10/2024    Stress 08/01/2023    Insufficiency fracture 02/10/2022    Tear of medial meniscus of left knee 02/10/2022    History of melanoma in situ of left upper extremity 02/02/2022    Bone marrow edema 12/09/2021    Fatigue 07/27/2021    Vitamin D deficiency 09/17/2020    Plantar fasciitis 09/17/2020    Routine health maintenance 09/17/2020    Allergies 08/26/2020    Osteoarthritis of left knee 02/25/2020    Chronic dermatitis 08/10/2019    Nevus 06/06/2019    Tinea corporis 06/06/2019    Pain of left hip joint 06/06/2019    Elevated glucose 10/02/2018    Obesity (BMI 30-39.9) 07/03/2018    Post-traumatic osteoarthritis of right knee 08/10/2017    Diverticulosis of colon without diverticulitis 11/18/2015    Foot pain, bilateral 11/18/2014    Hypercholesterolemia 11/18/2014    Disorder of male genital organs 12/12/2013    Backache 04/16/2013    Anxiety state 09/26/2012    Benign neoplasm of colon 09/26/2012    Dysuria 06/07/2012    Impotence of organic origin 06/07/2012    Hypermetropia 06/05/2012    Senile cataract 06/05/2012    Pain in soft tissues of limb 05/24/2012    Sebaceous cyst 02/16/2011    Disease of nail 08/08/2010    Presbyopia 07/24/2009    Dyspnea and respiratory abnormality 01/18/2008    Hallux valgus, acquired 06/14/2006    Calcaneal spur 04/20/2006    Congenital varus deformity of feet 03/28/2006    Kyphosis, acquired 02/07/2006    Pain in thoracic spine 02/07/2006    Hordeolum internum 08/25/2005    Subjective visual disturbance 08/25/2005       Past Medical History[1]    Past  Surgical History[2]    Prescriptions Prior to Admission[3]  Current Medications and Prescriptions Ordered in Epic[4]    Allergies[5]    Family History[6]  Social Hx on file[7]    Available pre-op labs reviewed.             Vital Signs:  Body mass index is 31.33 kg/m².   height is 1.829 m (6') and weight is 104.8 kg (231 lb).   Vitals:    06/02/25 1447   Weight: 104.8 kg (231 lb)   Height: 1.829 m (6')        Anesthesia Evaluation     Patient summary reviewed and Nursing notes reviewed    No history of anesthetic complications   Airway   Mallampati: II  TM distance: >3 FB  Neck ROM: full  Dental - Dentition appears grossly intact     Comment: Pt denies any loose or missing teeth.     Pulmonary - normal exam    breath sounds clear to auscultation  (+) shortness of breath  Cardiovascular - normal exam  (-) angina, HARMON    Rhythm: regular  Rate: normal  ROS comment: Patient denies chest pain, SOB, or SOB with exertion.     EKG  Normal sinus rhythm  Normal ECG  No previous ECGs found in [facility]  Confirmed by AMAIRANI CURIEL ([id number]) on 7/14/2023 4:44:47 PM    Specimen Collected: 07/14/23 15:15 Last Resulted: 07/14/23 16:44        Neuro/Psych    (+)  neuromuscular disease (Lumbosacral radiculitis.), anxiety/panic attacks,        GI/Hepatic/Renal    (+) bowel prep    Endo/Other - negative ROS   Abdominal                  Anesthesia Plan:   ASA:  2  Plan:   MAC  Plan Comments: Discussed the plan for monitored anesthesia care.  Risks including but not limited to aspiration, awareness, and assistance with breathing including but not limited intubation and conversion to a general anesthesia should depth of sedation or pain control is not satisfactory.  Pt expressed a thorough understanding and wishes to proceed.   Informed Consent Plan and Risks Discussed With:  Patient  Discussed plan with:  Attending      I have informed ANGELA Malhotra and/or legal guardian or family member of the nature of the anesthetic plan, benefits, risks  including possible dental damage if relevant, major complications, and any alternative forms of anesthetic management.   All of the patient's questions were answered to the best of my ability. The patient desires the anesthetic management as planned.  Magalie Campuzano MD  2025 2:32 PM  Present on Admission:  **None**           [1]   Past Medical History:   Colitis    1981    Dysplastic nevus    Left lower leg    High cholesterol    History of blood transfusion    no reactions    Melanoma in situ (HCC)    Left upper arm    Onychomycosis    Osteoarthritis    Other and unspecified hyperlipidemia    Plantar fasciitis    - Orthotics    Thoracic or lumbosacral neuritis or radiculitis, unspecified    Tinea    Visual impairment    readers   [2]   Past Surgical History:  Procedure Laterality Date    Colonoscopy      Colonoscopy N/A 3/8/2021    Procedure: COLONOSCOPY;  Surgeon: Quinn Plascencia MD;  [facility]: [facility] ENDOSCOPY    Tonsillectomy     [3]   Medications Prior to Admission   Medication Sig Dispense Refill Last Dose/Taking    atorvastatin 10 MG Oral Tab Take 1 tablet (10 mg total) by mouth nightly. 90 tablet 3 Taking    [] PEG 3350-KCl-Na Bicarb-NaCl (TRILYTE) 420 g Oral Recon Soln Take 4,000 mL (4 L total) by mouth one time for 1 dose. 4000 mL 0     latanoprost 0.005 % Ophthalmic Solution  (Patient not taking: Reported on 2025)   Not Taking    BETAMETHASONE DIPROPIONATE AUG 0.05 % External Cream APPLY TO RASH ON NECK TWICE DAILY AS NEEDED 50 g 1     ketoconazole 2 % External Cream Apply to affected area 2 times daily.to  feet and thighs 60 g 3    [4]   No current Epic-ordered facility-administered medications on file.     No current [facility]-ordered outpatient medications on file.   [5]   Allergies  Allergen Reactions    Penicillins UNKNOWN     Unable to remember reactions   [6]   Family History  Problem Relation Age of Onset    Stroke Mother         CVA; Cause of death    Macular degeneration Mother      Diabetes Father     Prostate Cancer Father 67    Heart Attack Father    [7]   Social History  Socioeconomic History    Marital status:    Tobacco Use    Smoking status: Never     Passive exposure: Never    Smokeless tobacco: Never   Vaping Use    Vaping status: Never Used   Substance and Sexual Activity    Alcohol use: Yes     Alcohol/week: 1.0 - 2.0 standard drink of alcohol     Types: 1 - 2 Shots of liquor per week     Comment: 1-2 drinks weekly    Drug use: No    Sexual activity: Not Currently   Other Topics Concern    Caffeine Concern Yes     Comment: Coffee, 3 cups daily;     [place] a farm No    History of tanning Yes    Outdoor occupation No    Reaction to local anesthetic No    Pt has a pacemaker No    Pt has a defibrillator No

## 2025-06-09 NOTE — ANESTHESIA POSTPROCEDURE EVALUATION
Patient: ANGELA Malhotra    Procedure Summary       Date: 06/09/25 Room / Location: [facility] ENDOSCOPY 01 / [facility] ENDOSCOPY    Anesthesia Start: 1520 Anesthesia Stop: 1606    Procedure: COLONOSCOPY Diagnosis:       History of colon polyps      (colon polyps and diverticulosis)    Surgeons: Quinn Plascencia MD Anesthesiologist: Magalie Campuzano MD    Anesthesia Type: MAC ASA Status: 2            Anesthesia Type: MAC    Vitals Value Taken Time   /59 06/09/25 16:06   Pulse 57 06/09/25 16:05   Resp 19 06/09/25 16:05   SpO2 100 % 06/09/25 16:05   Vitals shown include unfiled device data.    [facility] AN Post Evaluation:   Patient Evaluated in PACU  Patient Participation: complete - patient participated  Level of Consciousness: awake and alert  Pain Score: 0  Pain Management: satisfactory to patient  Airway Patency:patent  Yes    Nausea/Vomiting: none  Cardiovascular Status: acceptable and blood pressure returned to baseline  Respiratory Status: acceptable  Postoperative Hydration euvolemic      Magalie Campuzano MD  6/9/2025 4:06 PM

## 2025-06-12 NOTE — TELEPHONE ENCOUNTER
Recall colonoscopy in 3 years per Dr. Plascencia.     Last done 6/9/2025.     Recall entered into patient outreach for  6/9/2028.     Health maintenance updated.

## 2025-06-12 NOTE — TELEPHONE ENCOUNTER
----- Message from Quinn Plascencia sent at 6/10/2025  4:47 PM CDT -----  I spoke to Jamie.  He is feeling well.  He had #5 subcentimeter tubular adenomas removed.  I discussed the significance.  I recommended a high-fiber diet for diverticulosis and a surveillance   colonoscopy in 3 years.    GI RNs: Please enter colonoscopy recall for 3 years.  ----- Message -----  From: Lab, Background User  Sent: 6/10/2025   3:26 PM CDT  To: Quinn Plascencia MD

## 2025-06-13 NOTE — TELEPHONE ENCOUNTER
Spouse called on this.  Referral to Dr. Higginbotham is marked as authorized in the system but specialist office says prior authorization is needed and when patient contacted [facility] was told a prior authorization needs to be done through \"secure guide\".  Managed care can you please clarify?    Appointment is Tuesday 06/17.

## 2025-06-15 NOTE — PROGRESS NOTES
ANGELA Malhotra is a 69 year old male.  HPI:     CC:    Chief Complaint   Patient presents with    Full Skin Exam     LOV 3/2025. Pt present for full body skin exam. Personal Hx of Melanoma in situ on the Left arm (exicsion 01/22) and dysplastic nevus Left lower leg (04/22), and Aks. Family hx of skin cancer.          Allergies:  Penicillins    HISTORY:    Past Medical History:    Colitis    1981    Dysplastic nevus    Left lower leg    High cholesterol    History of blood transfusion    no reactions    Melanoma in situ (HCC)    Left upper arm    Onychomycosis    Osteoarthritis    Other and unspecified hyperlipidemia    Plantar fasciitis    - Orthotics    Thoracic or lumbosacral neuritis or radiculitis, unspecified    Tinea    Visual impairment    readers      Past Surgical History:   Procedure Laterality Date    Colonoscopy      Colonoscopy N/A 3/8/2021    Procedure: COLONOSCOPY;  Surgeon: Quinn Plascencia MD;  Location: [facility] ENDOSCOPY    Colonoscopy N/A 6/9/2025    Procedure: COLONOSCOPY;  Surgeon: Quinn Plascencia MD;  Location: [facility] ENDOSCOPY    Tonsillectomy        Family History   Problem Relation Age of Onset    Stroke Mother         CVA; Cause of death    Macular degeneration Mother     Diabetes Father     Prostate Cancer Father 67    Heart Attack Father       Social History     Socioeconomic History    Marital status:    Tobacco Use    Smoking status: Never     Passive exposure: Never    Smokeless tobacco: Never   Vaping Use    Vaping status: Never Used   Substance and Sexual Activity    Alcohol use: Yes     Alcohol/week: 1.0 - 2.0 standard drink of alcohol     Types: 1 - 2 Shots of liquor per week     Comment: 1-2 drinks weekly    Drug use: No    Sexual activity: Not Currently   Other Topics Concern    Caffeine Concern Yes     Comment: Coffee, 3 cups daily;     Grew up on a farm No    History of tanning Yes    Outdoor occupation No    Reaction to local anesthetic No    Pt has a pacemaker No     Pt has a defibrillator No        Current Outpatient Medications   Medication Sig Dispense Refill    atorvastatin 10 MG Oral Tab Take 1 tablet (10 mg total) by mouth nightly. 90 tablet 3    BETAMETHASONE DIPROPIONATE AUG 0.05 % External Cream APPLY TO RASH ON NECK TWICE DAILY AS NEEDED 50 g 1    ketoconazole 2 % External Cream Apply to affected area 2 times daily.to  feet and thighs 60 g 3    latanoprost 0.005 % Ophthalmic Solution  (Patient not taking: Reported on 6/11/2025)       Allergies:   Allergies   Allergen Reactions    Penicillins UNKNOWN     Unable to remember reactions       Past Medical History:    Colitis    1981    Dysplastic nevus    Left lower leg    High cholesterol    History of blood transfusion    no reactions    Melanoma in situ (HCC)    Left upper arm    Onychomycosis    Osteoarthritis    Other and unspecified hyperlipidemia    Plantar fasciitis    - Orthotics    Thoracic or lumbosacral neuritis or radiculitis, unspecified    Tinea    Visual impairment    readers     Past Surgical History:   Procedure Laterality Date    Colonoscopy      Colonoscopy N/A 3/8/2021    Procedure: COLONOSCOPY;  Surgeon: Quinn Plascencia MD;  Location: [facility] ENDOSCOPY    Colonoscopy N/A 6/9/2025    Procedure: COLONOSCOPY;  Surgeon: Quinn Plascencia MD;  Location: [facility] ENDOSCOPY    Tonsillectomy       Social History     Socioeconomic History    Marital status:      Spouse name: Not on file    Number of children: Not on file    Years of education: Not on file    Highest education level: Not on file   Occupational History    Not on file   Tobacco Use    Smoking status: Never     Passive exposure: Never    Smokeless tobacco: Never   Vaping Use    Vaping status: Never Used   Substance and Sexual Activity    Alcohol use: Yes     Alcohol/week: 1.0 - 2.0 standard drink of alcohol     Types: 1 - 2 Shots of liquor per week     Comment: 1-2 drinks weekly    Drug use: No    Sexual activity: Not Currently   Other  Topics Concern     Service Not Asked    Blood Transfusions Not Asked    Caffeine Concern Yes     Comment: Coffee, 3 cups daily;     Occupational Exposure Not Asked    Hobby Hazards Not Asked    Sleep Concern Not Asked    Stress Concern Not Asked    Weight Concern Not Asked    Special Diet Not Asked    Back Care Not Asked    Exercise Not Asked    Bike Helmet Not Asked    Seat Belt Not Asked    Self-Exams Not Asked    Grew up on a farm No    History of tanning Yes    Outdoor occupation No    Reaction to local anesthetic No    Pt has a pacemaker No    Pt has a defibrillator No   Social History Narrative    Not on file     Social Drivers of Health     Food Insecurity: Not on file   Transportation Needs: Not on file   Stress: Not on file   Housing Stability: Not on file     Family History   Problem Relation Age of Onset    Stroke Mother         CVA; Cause of death    Macular degeneration Mother     Diabetes Father     Prostate Cancer Father 67    Heart Attack Father        There were no vitals filed for this visit.    HPI:    Chief Complaint   Patient presents with    Full Skin Exam     LOV 3/2025. Pt present for full body skin exam. Personal Hx of Melanoma in situ on the Left arm (exicsion 01/22) and dysplastic nevus Left lower leg (04/22), and Aks. Family hx of skin cancer.      Patient for follow-up  For skin exam  History of melanoma in situ, dysplastic nevi melanoma type II 4/8/2022     melanoma in situ post wide excision left upper arm, history of AK's    Family history of skin cancer,     patient for follow-up.    History of Present Illness  4/25 H Jamie Malhotra is a 69 year old male who presents with skin concerns, including wisdom spots and cracked feet.    He spots on his face, which improve with the use of creams. He attributes some of the skin issues to wearing a collar, which may cause irritation.    He experiences cracked feet, which he believes may be fungal in nature. He uses antifungal creams  like Lotronex and Lamisil, and has ketoconazole cream for both his neck and feet. His feet crack if not moisturized, and he receives monthly pedicures to manage dead skin.    There is a family history of similar skin issues, with his mother and uncles experiencing skin buildup and cracking.        6/25 H Jamie Malhotra is a 69 year old male who presents for a dermatology consultation.    He is undergoing treatment for a chronic fungal infection of the toenails, using a solution recommended by a foot care specialist. The solution is costly but expected to last a year. He also receives monthly pedicures to manage dead skin and improve the condition of his feet. There has been improvement, but he still experiences occasional cracking, which he attributes to a hereditary issue.    He recently underwent a colonoscopy with normal results and has a follow-up scheduled in three years. He felt well post-procedure, although he experienced some dehydration.    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 He recently attended a larger-than-expected gathering related to his long-term and plans to wrap up his current responsibilities by August 1st.      Past notes/ records and appropriate/relevant lab results including pathology and past body maps reviewed. Including outside notes/ PCP notes as appropriate. Updated and new information noted in current visit.     Patient presents with concerns above.    Patient has been in their usual state of health.      History, medications, allergies reviewed as noted.      ROS:  new relevant systemic complaints as noted       Physical Examination:     Well-developed well-nourished patient alert oriented in no acute distress.  Exam total-body performed, including scalp, head, neck, face,nails, hair, external eyes, including conjunctival mucosa, eyelids, lips external ears, back, chest,/  breasts, axillae,  abdomen, arms, legs, palms.     Multiple light to medium brown, well marginated, uniformly pigmented, macules and papules 6 mm and less scattered on exam. pigmented lesions examined with dermoscopy benign-appearing patterns.     Waxy tannish keratotic papules scattered, cherry-red vascular papules scattered.    See map today's date for lesions noted .      Otherwise remarkable for lesions as noted on map.  See details of examination  See Assessment /Plan for additional history and physical exam also:    Assessment / plan:    No orders of the defined types were placed in this encounter.      Meds & Refills for this Visit:  Requested Prescriptions      No prescriptions requested or ordered in this encounter         Encounter Diagnoses   Name Primary?    AK (actinic keratosis) Yes    Atypical nevi     Multiple nevi     Lentigo     SK (seborrheic keratosis)     Encounter for surveillance of abnormal nevi     Encounter for follow-up surveillance of melanoma     Benign neoplasm of skin, unspecified location     Rosacea     Onychomycosis        Fall risk assessment:  Given the results of the fall risk questionnaire given today.   Suggest:   Make sure there is adequate lighting.  Eliminate throw rugs or possible sources of tripping & falling  Consider grab bars on the shower & toilet.  Hand rails on all stair cases  Ambulatory assistance devices such as cane or walker as indicate.  To ensure home safety measures.      See details on map.      Remarkable for:  Patient seen for follow-up long-term monitoring, treatment of  Atypical nevi, sun damage AK's, history of melanoma.  High risk patient.  Other skin conditions  Plan of care:  ongoing surveillance, monitoring including regular follow-up due to longer term risk of recurrence, new lesions.  See previous notes.  There is a longitudinal care relationship with me, the care plan reflects the ongoing nature of the continuous relationship of care, and the  medical record indicates that there is ongoing treatment of a serious/complex medical condition which I am currently managing.  is Applicable    Plan is to continue monitoring every 4 to 6 months or as needed        Physical Exam      Results  DIAGNOSTIC  Colonoscopy: Normal (06/09/2025)    Assessment & Plan  4/25  Actinic keratosis  Actinic keratosis managed with topical treatments, improving with creams. Condition related to sun damage, exacerbated by driving with the top down despite wearing a hat.  - Continue prescribed topical treatments as needed.  - Encourage regular use of sunscreen and protective clothing to prevent further sun damage.    Chronic dry skin with hereditary keratoderma  Chronic dry skin and cracking, particularly on the feet, likely hereditary. Family history of similar skin issues, common in ethnicities like Swedes and Norwegians. Managed with regular pedicures and creams.  - Provide samples of urea-based creams for exfoliation and hydration.  - Encourage regular moisturizing to prevent cracking and discomfort.    Tinea pedis  Cracking of the feet likely due to tinea pedis. Previously effective ketoconazole cream not used regularly on feet.  - Continue ketoconazole cream on the feet as needed.  - Provide samples of urea-based creams for exfoliation and hydration of the feet.    Follow-up  Insurance coverage changes may affect ability to continue care. Transitioning from HMO to O, dependent on network coverage.  - Schedule follow-up appointment for June or July to reassess skin conditions and treatment efficacy.      6/25  Onychomycosis  Onychomycosis affecting toenails, showing improvement with regular pedicures and antifungal solutions. The condition is chronic but manageable with ongoing treatment. He reports the antifungal solution is expensive but expected to last a year.  - Continue monthly scraping pedicures to remove dead skin and improve nail appearance.  - Continue soaking  feet in antifungal solution as recommended by foot care specialist.    Chronic dry skin with hereditary keratoderma  Chronic dry skin with hereditary keratoderma, leading to occasional cracking. The condition is hereditary and requires regular management to prevent severe symptoms. He acknowledges the hereditary nature of the condition and the need for consistent care.  - Maintain regular skin care routine to manage symptoms.      Erythematous scaling keratotic papules  erythematous scaling keratotic papules noted at sites noted on map  Actinic Keratoses.  Precancerous nature discussed. Sun protection, sunscreen/ blocks encouraged Lesions treated with cryo- .  Biopsy if not resolved.    AK left cheek   X1      History of inflamed acneiform nodules, boils continue clindamycin gel as needed oral antibiotics if worsening to let us know is doing    Dermatitis stable overall doing well continue triamcinolone, Diprolene cream for more severe flare dermatitis.  Overall improved.  Continue regular monitoring meds in grid.  Skin care instructions reviewed.  [place] use of emollients.  Pathophysiology reviewed.  Consider Contac allergy in differential.  Consider patch testing.  Patient will let us know how they are doing over the next several weeks.  Await clinical response to above therapies.    pt with history of MIS left arm post wide excision. 1/22 Doing well.  Patient at high risk given family history skin cancer grew up in [place] was in the sun excessively.  Lived in [place] for a number of years.  Patient will follow-up every 3 to 4 months for routine skin check.  At risk for melanoma and nonmelanoma skin cancers.    Mildly atypical compound nevus left lower leg4/22  No recurrence.  Monitor carefully  No new clinically atypical pigmented lesions on dermoscopy all benign patterns.  Continue careful monitoring    With history melanoma in situ and atypical nevi as well as history of sun damage follow-up every 3 to 4  months.  Continue careful follow-up      Actinic Keratoses.  Precancerous nature discussed. Sun protection, sunscreen/ blocks encouraged .  Monitoring for new lesions.  Sun damage additional recurrent and new actinic keratoses, skin cancers may occur in areas of prior actinic keratoses, related to past sun exposure to minimize current sun exposure.  Sunscreen applied consistently regularly, reapplication and sun protection while driving recommended.    Erythema fine papules telangiectasia of the nose and cheeks continue sun protection MetroGel nightly encouraged more consistent use more erythema noted.  Has not been using this as frequently as ideal stress at work.  Looking forward to detention in the next several years.  .Rosacea.  Meds in grid.  Skin care instructions reviewed.  Pathophysiology reviewed.  Chronic recurrent nature discussed.  Patient will let us know how they are doing over the next several weeks.  Await clinical response to above therapy.   Darker brown macule lower back observe unchanged small nevi over the legs arms back observe carefully consistent with junctional nevi.  Monitor    Erythema scaling over the plantar feet onychomycotic changes noted.  Keratotic changes we will add topical antifungal, Kerydin overall improving continue current regimen stable improving    History of tinea ketoconazole as needed, onychomycosis longstanding over-the-counter spray, consider additional topicals.  Not particular bothersome    Irritated tag-like area reported perianal inflamed intermittently follow-up with Dr. Mcbride for this.    No other susupicious lesions on todays  exam.    please refer to map for specific lesions.  See additional diagnoses.  Pros cons of various therapies, risks benefits discussed.Pathophysiology discussed with patient.  Therapeutic options reviewed.  See  Medications in grid.  Instructions reviewed at length.    Benign nevi, seborrheic  keratoses, cherry angiomas:  Reassurance  regarding other benign skin lesions.    Monitor for new or changing lesions. Signs and symptoms of skin cancer, ABCDE's of melanoma ( additional information available at AAD.org, skincancer.org) Encourage Sunscreen (broad-spectrum, ideally mineral-based-UVA/UVB -SPF 30 or higher) use encouraged, sun protection/sun protective clothing, self exams reviewed Followup as noted RTC ---routine checkup 6 mos -one year or p.r.n.    Encounter Times   Including precharting, reviewing chart, prior notes obtaining history: 10 minutes, medical exam :10 minutes, notes on body map, plan, counseling 10minutes My total time spent caring for the patient on the day of the encounter: 30 minutes     The patient indicates understanding of these issues and agrees to the plan.  The patient is asked to return as noted in follow-up/ above.    This note was generated using Dragon voice recognition software.  Please contact me regarding any confusion resulting from errors in recognition..  Note to patient and family: The 21st Century Cures Act makes medical notes like these available to patients. However, be advised this is a medical document. It is intended as [id number] communication and monitoring of a patient's care needs. It is written in medical language and may contain abbreviations or verbiage that are unfamiliar. It may appear blunt or direct. Medical documents are intended to carry relevant information, facts as evident and the clinical opinion of the practitioner.

## 2025-06-17 NOTE — TELEPHONE ENCOUNTER
Zee calling from referral department. States Dr Higginbotham is out of network patient may need to be redirected. Patient has appointment with provider today. If provider wants patient to be seen with this specific provider, Dr Henley will need to submit a letter of medical necessity which takes about 5 days for approval. Please advise.

## 2025-06-25 NOTE — PROGRESS NOTES
HEARING AID EVALUATION    ANGELA Malhotra is a 69 year old male     Referring Provider: No ref. provider found   YOB: 1955  Medical Record: [id number]    Hearing Aid Prescription Date of Issue: 6-  Hearing Aid Prescription Date of Expiration:   3- (One year from date of audiometric testing)     Patient History of Hearing Loss:   Patient will be retiring in the month of August and would like to obtain hearing aids through current insurance.   Benefit verification shows aids to be covered at \"100% of allowed amount.\"    Patient's handicap inventory score is low, suggesting that he is not aware of significant hearing difficulties.   Patient does note that his partner may be more aware of his hearing loss than he is.   Patient will likely use the [employer] connectivity feature.          Test Results:   See audiogram for air and bone conduction thresholds and recorded speech in quiet.    Quick SIN Speech in Noise Test presented binaurally through insert earphones yielded score of  10.5 SNR loss  The following table  shows scoring for this test and gives an indication of how well this person would be expected to hear in a background of noise.    0 - 3 dB SNR-Loss              Normal  4 - 7 dB SNR-Loss              Mild  8 - 15 dB SNR Loss           Moderate  >15 dB SNR Loss               Severe    Hearing Handicap Inventory Screening Version (HHIE-S)  Total Score= 6   (0-8 no handicap, 10-24 mild-mod handicap, 26-40 severe handicap)    Hearing Aid Selection:    Based on the audiometric test results, patient perception of hearing difficulty, and patient lifestyle the following hearing aid(s) and features are recommended as medically necessary to improve the patient's ability to hear speech sounds in a variety of listening environments.   The use of hearing aids will enhance the patient's quality of life and improve the ability to maintain social contacts with work, family or friends.      Type/Style  of hearing aid recommended:   ROBBIE ( in canal with open dome)  Large Open domes    Recommended Features of hearing aid:    Rechargeable   will likely want to proceed with rechargeable.   Bluetooth Connectivity  Noise reduction to improve speech perception in noisy environments  Dual Microphones to improve hearing speech from different directions  Active Feedback management  Frequency Lowering Capabilities to improve speech perception with severe high-frequency hearing loss  Echoblock/Windblock technology to reduce rosa noise and reverberation in certain environments  Tinnitus Masking capabilities  Automatic steering between listening environments.       Hearing aid charges and policies were discussed with patient including warranty information, trial period with amplification, benefits/limitations of current technology.  Realistic expectations and acclimating to new sounds was also discussed.          Hearing Aid Order:   :  [employer]  Model: Audeo Infinio  not Sphere  Color Choice:  graphite gray vs silver gray vs champagne  Wire length right  #2  Wire length left  #2   Power right: M   Power left  M  Technology Level Choice:  TBD       Patient would like to discuss his hearing aid options with his partner before deciding on exactly what we would like to order.   He will respond to [employer] message with decisions about color and technology level       Audiologist:  Monika Horowitz  Audiology [place] License Number: [id number]

## 2025-07-01 NOTE — TELEPHONE ENCOUNTER
Patient called asking for an appointment for a physical. I informed him that the first available appointment is not until September. Before I could explain anything to the patient he interrupted me stating that he needs to be seen before September because he will be \"out of the system\". I asked the patient what he meant by that he repeated himself and didn't explain what that meant. He then stated he needs an emergency appointment for his head and his foot. I asked the patient again if he is wanting a physical or if he is having issues with his head and foot as I explained that I need to know to allow me to schedule his appointment correctly or if I need to tranfers him to a RN due to emergent symptoms, or make sure that the appointment is scheduled for enough time to discuss his concerns and he became upset stating I just need a meeting with my medical Dr. He is requesting a callback for a sooner appointment.      (FYI, patient is not due for his physical until AFTER 09/09/2025.)

## 2025-07-01 NOTE — TELEPHONE ENCOUNTER
Patient called back, verified Name and . Follow-up appointment scheduled. Also has question about a letter he received regarding Ophthalmology referral; addressed in encounter  Patient Message - Ophthalmology Referral to [facility].    Future Appointments   Date Time Provider Department Center   2025  1:15 PM Amaris Henley MD [facility] [place]

## 2025-07-01 NOTE — TELEPHONE ENCOUNTER
Hello,    This patients referral was closed by the [employer].  Message from [employer] copied below.   Thank you,  Jeanette     Comment:  This is a referral for a second opinion with an out of network provider. The member has not had a second opinion with an in-network provider, which is required by the Medical Group before referral to a tertiary or out-of-network provider. Please refer this member to an in-network provider such as: [facility] or [facility] ([phone]). If you need assistance finding an in-network provider, please utilize the [email or web address] website or contact customer service at [phone]. If you intend to pursue this request, please resubmit a new referral with supporting clinical documentation to indicate medical necessity for the use of an out-of-network provider. This case will be closed as the Medical Group does not allow OON second opinions prior to a second opinion with an in-network provider.

## 2025-07-01 NOTE — TELEPHONE ENCOUNTER
Patient called, verified Name and . Following up on a letter he received regarding Ophthalmology referral.     [facility] kindly provide updates. Thank you.

## 2025-07-17 NOTE — PROGRESS NOTES
HPI:    Patient ID: ANGELA Malhotra is a 69 year old male.    HPI  Pt presenting for follow-up.    Reports chronic knee pain  [place]  Awaiting cortisone shot    Trying to eliminate alcohol intake  Has cut back, now 1-2 drinks Qevening  Considering cessation options    Getting 2nd opinion for Ophtho    Denies chest pain, palpitations, dizziness    Review of Systems   A comprehensive 10 point review of systems was completed.  Pertinent positives and negatives noted in the the HPI.     Current Medications[1]  Allergies:Allergies[2]   Vitals:    07/17/25 1305   BP: 121/76   Pulse: 65   SpO2: 97%   Weight: 231 lb 12.8 oz (105.1 kg)   Height: 6' (1.829 m)       Body mass index is 31.44 kg/m².   PHYSICAL EXAM:   Physical Exam  Vitals reviewed.   Constitutional:       General: He is not in acute distress.     Appearance: Normal appearance.   HENT:      Head: Normocephalic and atraumatic.      Right Ear: External ear normal.      Left Ear: External ear normal.   Eyes:      Conjunctiva/sclera: Conjunctivae normal.   Cardiovascular:      Rate and Rhythm: Normal rate and regular rhythm.      Heart sounds: Normal heart sounds, S1 normal and S2 normal. No murmur heard.  Pulmonary:      Effort: Pulmonary effort is normal. No respiratory distress.      Breath sounds: Normal breath sounds. No wheezing, rhonchi or rales.   Abdominal:      General: Bowel sounds are normal.      Palpations: Abdomen is soft.      Tenderness: There is no abdominal tenderness. There is no guarding or rebound.   Musculoskeletal:      Cervical back: Normal range of motion and neck supple. No muscular tenderness.      Right lower leg: No edema.      Left lower leg: No edema.   Lymphadenopathy:      Cervical: No cervical adenopathy.   Skin:     General: Skin is warm.      Coloration: Skin is not jaundiced.   Neurological:      General: No focal deficit present.      Mental Status: He is alert and oriented to person,  place, and time. Mental status is at baseline.   Psychiatric:         Attention and Perception: Attention normal.         Mood and Affect: Mood normal.         Behavior: Behavior normal. Behavior is cooperative.         Cognition and Memory: Cognition normal.                ASSESSMENT/PLAN:   1. Hypercholesterolemia  Last CTC reviewed  - discussed healthy diet and lifestyle changes for overall wellness, which includes decreased carb and sugar intake, increased fiber intake, and increased water intake as tolerated, as well as regular exercise  - will monitor  - CT CALCIUM SCORING; Future    2. Family history of heart disease  As above  - CT CALCIUM SCORING; Future    3. Chronic pain of both knees  - encouraged gentle stretching/strengthening exercises  - encouraged to increase hydration and rest as able  - PT for symptom management  - follow-up with Ortho  - to call with any questions or concerns  - Physical Therapy Referral - External    4. Alcohol use  Briefly discussed treatment options including CBT, meds    Encouraged to complete standing labs. Pt verbalized understanding and agrees with plan.      No orders of the defined types were placed in this encounter.      Meds This Visit:  Requested Prescriptions      No prescriptions requested or ordered in this encounter       Imaging & Referrals:  PHYSICAL THERAPY EXTERNAL  CT CALCIUM SCORING         ID#[id number]         [1]   Current Outpatient Medications   Medication Sig Dispense Refill    Meloxicam 15 MG Oral Tab Take 1 tablet (15 mg total) by mouth daily.      atorvastatin 10 MG Oral Tab Take 1 tablet (10 mg total) by mouth nightly. 90 tablet 3    BETAMETHASONE DIPROPIONATE AUG 0.05 % External Cream APPLY TO RASH ON NECK TWICE DAILY AS NEEDED 50 g 1    ketoconazole 2 % External Cream Apply to affected area 2 times daily.to  feet and thighs 60 g 3   [2]   Allergies  Allergen Reactions    Penicillins UNKNOWN     Unable to remember reactions

## 2025-07-18 NOTE — PROGRESS NOTES
ANGELA Malhotra is a 69 year old male     Referring Provider: No ref. provider found   YOB: 1955  Medical Record: [id number]    Hearing Aid Fitting    Hearing Aid Information  Date of Dispensin2025  Make: Yasmin     Model:  Ubaldoeo I90-R   Right Serial # [id number]  Left Serial # [id number]  Color   silver gray    Power:  M  Wire Length: #2  Domes:  Large open  Battery: Rechargeable Li-Ion  Wax System:  CeruStop     Accessory: Phonak  ROBBIE I #[id number]       Real Ear Measurements (REM) were completed today and compared with NAL-N2 targets.   Results show a good match at soft, average and loud inputs.   Results were run at 100% but then reduced to 90% for patient comfort.     Completed orientation but may need to review use of wax guards and practicing changing domes.  Encouraged patient toward consistent use.   He did note that he heard an echo, which was better with decreased volume.   Advised he should acclimate to a more normal sound with consistent use.     The following items were demonstrated to the patient:  Insertion/removal of hearing aid into ear  Use of batteries and/or  unit  Function and operation of controls  Telephone use  Cleaning of hearing aid/earmold  Storage of hearing aids    The patient was informed of or received the following:  Adjustment period and realistic expectations  Cleaning tools  Wax Guards  Hearing aid instruction book   Carrying case  Purchase agreement  Repair/loss coverage:  3 years  Trial period/return policy:  45 days from dispensing date  Service period:  6 months from dispensing date.    Office visit charges will apply after initial service period.     Charges entered for aids today and billed to insurance.   Patient aware he may have some out of pocket expense.     Patient expressed understanding to all discussed topics.  Follow-up scheduled for 2 weeks.      24  Antonio Kaba

## (undated) DEVICE — Device: Brand: DEFENDO AIR/WATER/SUCTION AND BIOPSY VALVE

## (undated) DEVICE — 3 ML SYRINGE LUER-LOCK TIP: Brand: MONOJECT

## (undated) DEVICE — CLIP SM INTNL HMCLP TNTLM ESCP

## (undated) DEVICE — CAUTERY BLADE 2IN INS E1455

## (undated) DEVICE — SNARE CAPTIFLEX MICRO-OVL OLY

## (undated) DEVICE — GAMMEX® PI HYBRID SIZE 7, STERILE POWDER-FREE SURGICAL GLOVE, POLYISOPRENE AND NEOPRENE BLEND: Brand: GAMMEX

## (undated) DEVICE — ADHESIVE MASTISOL 2/3CC VL

## (undated) DEVICE — IMPERVIOUS STOCKINETTE: Brand: DEROYAL

## (undated) DEVICE — UNDYED BRAIDED (POLYGLACTIN 910), SYNTHETIC ABSORBABLE SUTURE: Brand: COATED VICRYL

## (undated) DEVICE — MEDI-VAC NON-CONDUCTIVE SUCTION TUBING 6MM X 1.8M (6FT.) L: Brand: CARDINAL HEALTH

## (undated) DEVICE — PACK SRG UNV 1 STRL LF

## (undated) DEVICE — 6 ML SYRINGE LUER-LOCK TIP: Brand: MONOJECT

## (undated) DEVICE — PEN: MARKING STD PT 100/CS: Brand: MEDICAL ACTION INDUSTRIES

## (undated) DEVICE — DRAPE SHEET LG

## (undated) DEVICE — GAMMEX® PI HYBRID SIZE 6, STERILE POWDER-FREE SURGICAL GLOVE, POLYISOPRENE AND NEOPRENE BLEND: Brand: GAMMEX

## (undated) DEVICE — FLEXIBLE YANKAUER,MEDIUM TIP, NO VACUUM CONTROL: Brand: ARGYLE

## (undated) DEVICE — PROXIMATE RH ROTATING HEAD SKIN STAPLERS (35 WIDE) CONTAINS 35 STAINLESS STEEL STAPLES: Brand: PROXIMATE

## (undated) DEVICE — STANDARD HYPODERMIC NEEDLE,POLYPROPYLENE HUB: Brand: MONOJECT

## (undated) DEVICE — SUTURE ETHILON 3-0 PS-2

## (undated) DEVICE — FORCEP RADIAL JAW 4

## (undated) DEVICE — Device: Brand: CUSTOM PROCEDURE KIT

## (undated) DEVICE — SUTURE ETHIBOND EXCEL 2-0 SH

## (undated) DEVICE — 60 ML SYRINGE LUER-LOCK TIP: Brand: MONOJECT

## (undated) DEVICE — TOWEL SURG OR 17X30IN BLUE

## (undated) DEVICE — SNARE OPTMZ PLPCTM TRP

## (undated) DEVICE — NON-ADHERENT PAD PREPACK: Brand: TELFA

## (undated) DEVICE — YANKAUER SUCTION INSTRUMENT NO CONTROL VENT, BULB TIP, CLEAR: Brand: YANKAUER

## (undated) DEVICE — SYRINGE MNJCT 10ML LF STRL REG

## (undated) DEVICE — SUTURE VICRYL 4-0 J494G

## (undated) DEVICE — SUTURE VICRYL 3-0 SH

## (undated) DEVICE — MINOR GENERAL: Brand: MEDLINE INDUSTRIES, INC.

## (undated) DEVICE — SUTURE PDS II 3-0 SH

## (undated) DEVICE — LINE MNTR ADLT SET O2 INTMD

## (undated) DEVICE — SOL  .9 1000ML BTL

## (undated) DEVICE — SUTURE SILK 2-0 SH

## (undated) DEVICE — PROXIMATE SKIN STAPLERS (35 WIDE) CONTAINS 35 STAINLESS STEEL STAPLES (FIXED HEAD): Brand: PROXIMATE

## (undated) DEVICE — 35 ML SYRINGE REGULAR TIP: Brand: MONOJECT

## (undated) DEVICE — SUTURE PROLENE 2-0 SH

## (undated) NOTE — LETTER
02/01/19        ANGELA Haas      Dear Josefina Bains records indicate that you have outstanding lab work and or testing that was ordered for you and has not yet been completed:  Orders Placed This Encounter      Lip

## (undated) NOTE — LETTER
8/18/2020    ANGELA Malhotra        [address] [place] [address]            Dear Kriss Alvarado,      Our records indicate that you are due for an appointment for a Colonoscopy with Janet Vázquez MD.    Please call our office to sc

## (undated) NOTE — LETTER
07/19/18        ANGELA Haas      Dear Abilio December records indicate that you have outstanding lab work and or testing that was ordered for you and has not yet been completed:          CMP      TSH W Reflex To Free

## (undated) NOTE — LETTER
[facility]  Administration of Anesthesia  1. Indu Arzate, or _________________________________ acting on his behalf, (Patient) (Dependent/Representative) request to receive anesthesia for my pending procedure/operation/treatment. bleeding, seizure, cardiac arrest and death. 7. AWARENESS: I understand that it is possible (but unlikely) to have explicit memory of events from the operating room while under general anesthesia.   8. ELECTROCONVULSIVE THERAPY PATIENTS: This consent serve below affirms that prior to the time of the procedure, I have explained to the patient and/or his/her guardian, the risks and benefits of undergoing anesthesia, as well as any reasonable alternatives.     ___________________________________________________

## (undated) NOTE — LETTER
[address], [place], [place]    Authorization for Surgical Operation and Procedure                               I hereby authorize Quinn Plascencia MD, my physician and his/her assistants (if applicable), which may include medical students, residents, and/or fellows, to perform the following surgical operation/ procedure and administer such anesthesia as may be determined necessary by my physician: Operation/Procedure name (s) COLONOSCOPY on ANGELA Malhotra   2.   I recognize that during the surgical operation/procedure, unforeseen conditions may necessitate additional or different procedures than those listed above.  I, therefore, further authorize and request that the above-named surgeon, assistants, or designees perform such procedures as are, in their judgment, necessary and desirable.    3.   My surgeon/physician has discussed prior to my surgery the potential benefits, risks and side effects of this procedure; the likelihood of achieving goals; and potential problems that might occur during recuperation.  They also discussed reasonable alternatives to the procedure, including risks, benefits, and side effects related to the alternatives and risks related to not receiving this procedure.  I have had all my questions answered and I acknowledge that no guarantee has been made as to the result that may be obtained.    4.   Should the need arise during my operation/procedure, which includes change of level of care prior to discharge, I also consent to the administration of blood and/or blood products.  Further, I understand that despite careful testing and screening of blood or blood products by collecting agencies, I may still be subject to ill effects as a result of receiving a blood transfusion and/or blood products.  The following are some, but not all, of the potential risks that can occur: fever and allergic reactions, hemolytic reactions, transmission of diseases such  as Hepatitis, AIDS and Cytomegalovirus (CMV) and fluid overload.  In the event that I wish to have an autologous transfusion of my own blood, or a directed donor transfusion, I will discuss this with my physician.  Check only if Refusing Blood or Blood Products  I understand refusal of blood or blood products as deemed necessary by my physician may have serious consequences to my condition to include possible death. I hereby assume responsibility for my refusal and release the hospital, its personnel, and my physicians from any responsibility for the consequences of my refusal.    o  Refuse   5.   I authorize the use of any specimen, organs, tissues, body parts or foreign objects that may be removed from my body during the operation/procedure for diagnosis, research or teaching purposes and their subsequent disposal by hospital authorities.  I also authorize the release of specimen test results and/or written reports to my treating physician on the hospital medical staff or other referring or consulting physicians involved in my care, at the discretion of the Pathologist or my treating physician.    6.   I consent to the photographing or videotaping of the operations or procedures to be performed, including appropriate portions of my body for medical, scientific, or educational purposes, provided my identity is not revealed by the pictures or by descriptive texts accompanying them.  If the procedure has been photographed/videotaped, the surgeon will obtain the original picture, image, videotape or CD.  The hospital will not be responsible for storage, release or maintenance of the picture, image, tape or CD.    7.   I consent to the presence of a  or observers in the operating room as deemed necessary by my physician or their designees.    8.   I recognize that in the event my procedure results in extended X-Ray/fluoroscopy time, I may develop a skin reaction.    9. If I have a Do Not Attempt  Resuscitation (DNAR) order in place, that status will be suspended while in the operating room, procedural suite, and during the recovery period unless otherwise explicitly stated by me (or a person authorized to consent on my behalf). The surgeon or my attending physician will determine when the applicable recovery period ends for purposes of reinstating the DNAR order.  10. Patients having a sterilization procedure: I understand that if the procedure is successful the results will be permanent and it will therefore be impossible for me to inseminate, conceive, or bear children.  I also understand that the procedure is intended to result in sterility, although the result has not been guaranteed.   11. I acknowledge that my physician has explained sedation/analgesia administration to me including the risk and benefits I consent to the administration of sedation/analgesia as may be necessary or desirable in the judgment of my physician.    I CERTIFY THAT I HAVE READ AND FULLY UNDERSTAND THE ABOVE CONSENT TO OPERATION and/or OTHER PROCEDURE.     ____________________________________  _________________________________        ______________________________  Signature of Patient    Signature of Responsible Person                Printed Name of Responsible Person                                      ____________________________________  _____________________________                ________________________________  Signature of Witness        Date  Time         Relationship to Patient    STATEMENT OF PHYSICIAN My signature below affirms that prior to the time of the procedure; I have explained to the patient and/or his/her legal representative, the risks and benefits involved in the proposed treatment and any reasonable alternative to the proposed treatment. I have also explained the risks and benefits involved in refusal of the proposed treatment and alternatives to the proposed treatment and have answered the patient's  questions. If I have a significant financial interest in a co-management agreement or a significant financial interest in any product or implant, or other significant relationship used in this procedure/surgery, I have disclosed this and had a discussion with my patient.     _____________________________________________________              _____________________________  (Signature of Physician)                                                                                         (Date)                                   (Time)  Patient Name: ANGELA Malhotra      : 1955      Printed: 2025     Medical Record #: [id number]                                      Page 1 of 1

## (undated) NOTE — LETTER
[place] [facility]  Administration of Anesthesia  ANGELA YOON Parimarietta agree to be cared for by a physician anesthesiologist alone and/or with a nurse anesthetist, who is specially trained to monitor me and give me medicine to put me to sleep or keep me comfortable during my procedure    I understand that my anesthesiologist and/or anesthetist is not an employee or agent of [facility] or [facility]. He or she works for [facility] [employer]    As the patient asking for anesthesia services, I agree to:  Allow the anesthesiologist (anesthesia doctor) to give me medicine and do additional procedures as necessary. Some examples are: Starting or using an “IV” to give me medicine, fluids or blood during my procedure, and having a breathing tube placed to help me breathe when I’m asleep (intubation). In the event that my heart stops working properly, I understand that my anesthesiologist will make every effort to sustain my life, unless otherwise directed by [facility] Do Not Resuscitate documents.  Tell my anesthesia doctor before my procedure:  If I am pregnant.  The last time that I ate or drank.  iii. All of the medicines I take (including prescriptions, herbal supplements, and pills I can buy without a prescription (including street drugs/illegal medications). Failure to inform my anesthesiologist about these medicines may increase my risk of anesthetic complications.  iv.If I am allergic to anything or have had a reaction to anesthesia before.  I understand how the anesthesia medicine will help me (benefits).  I understand that with any type of anesthesia medicine there are risks:  The most common risks are: nausea, vomiting, sore throat, muscle soreness, damage to my eyes, mouth, or teeth (from breathing tube placement).  Rare risks include: remembering what happened during my procedure, allergic reactions to medications, injury to my airway, heart, lungs, vision, nerves, or  muscles and in extremely rare instances death.  My doctor has explained to me other choices available to me for my care (alternatives).  Pregnant Patients (“epidural”):  I understand that the risks of having an epidural (medicine given into my back to help control pain during labor), include itching, low blood pressure, difficulty urinating, headache or slowing of the baby’s heart. Very rare risks include infection, bleeding, seizure, irregular heart rhythms and nerve injury.  Regional Anesthesia (“spinal”, “epidural”, & “nerve blocks”):  I understand that rare but potential complications include headache, bleeding, infection, seizure, irregular heart rhythms, and nerve injury.    _____________________________________________________________________________  Patient (or Representative) Signature/Relationship to Patient  Date   Time    _____________________________________________________________________________   Name (if used)    Language/Organization   Time    _____________________________________________________________________________  Nurse Anesthetist Signature     Date   Time  _____________________________________________________________________________  Anesthesiologist Signature     Date   Time  I have discussed the procedure and information above with the patient (or patient’s representative) and answered their questions. The patient or their representative has agreed to have anesthesia services.    _____________________________________________________________________________  Witness        Date   Time  I have verified that the signature is that of the patient or patient’s representative, and that it was signed before the procedure  Patient Name: ANGELA Malhotra     : 1955                 Printed: 2025 at 3:36 PM    Medical Record #: [id number]                                            Page 1 of 1  ----------ANESTHESIA CONSENT----------

## (undated) NOTE — LETTER
02/01/19        ANGELA Haas      Dear Petros Banda records indicate that you have outstanding lab work and or testing that was ordered for you and has not yet been completed:  Orders Placed This Encounter      Lip

## (undated) NOTE — LETTER
AUTHORIZATION FOR SURGICAL OPERATION OR OTHER PROCEDURE    1. I hereby authorize Dr. Luke Florence, and [address] [place] [address] staff assigned to my case to perform the following operation and/or procedure at the [address] [place] [address]:    __________________Cortisone Injection left knee______________      _______________________________________________________________________________________________    2. My physician has explained the nature and purpose of the operation or other procedure, possible alternative methods of treatment, the risks involved, and the possibility of complication to me. I acknowledge that no guarantee has been made as to the result that may be obtained. 3.  I recognize that, during the course of this operation, or other procedure, unforseen conditions may necessitate additional or different procedure than those listed above. I, therefore, further authorize and request that the above named physician, his/her physician assistants or designees perform such procedures as are, in his/her professional opinion, necessary and desirable. 4.  Any tissue or organs removed in the operation or other procedure may be disposed of by and at the discretion of the [address] [place] [address] and [facility]. 5.  I understand that in the event of a medical emergency, I will be transported by local paramedics to [facility] or other hospital emergency department. 6.  I certify that I have read and fully understand the above consent to operation and/or other procedure. 7.  I acknowledge that my physician has explained sedation/analgesia administration to me including the risks and benefits. I consent to the administration of sedation/analgesia as may be necessary or desirable in the judgement of my physician. Witness signature: ___________________________________________________ Date:  ______/______/_____                    Time:  ________ A. M.  P.M.        Patient Name: ______________________________________________________  (please print)      Patient signature:  ___________________________________________________             Relationship to Patient:           []  Parent    Responsible person                          []  Spouse  In case of minor or                    [] Other  _____________   Incompetent name:  __________________________________________________                               (please print)      _____________      Responsible person  In case of minor or  Incompetent signature:  _______________________________________________    Statement of Physician  My signature below affirms that prior to the time of the procedure, I have explained to the patient and/or his/her guardian, the risks and benefits involved in the proposed treatment and any reasonable alternative to the proposed treatment. I have also explained the risks and benefits involved in the refusal of the proposed treatment and have answered the patient's questions.                         Date:  ______/______/_______  Provider                      Signature:  __________________________________________________________       Time:  ___________ A.M    P.M.